# Patient Record
Sex: MALE | Race: WHITE | Employment: FULL TIME | URBAN - METROPOLITAN AREA
[De-identification: names, ages, dates, MRNs, and addresses within clinical notes are randomized per-mention and may not be internally consistent; named-entity substitution may affect disease eponyms.]

---

## 2017-01-10 ENCOUNTER — OFFICE VISIT (OUTPATIENT)
Dept: BARIATRICS/WEIGHT MGMT | Age: 42
End: 2017-01-10

## 2017-01-10 VITALS
DIASTOLIC BLOOD PRESSURE: 64 MMHG | SYSTOLIC BLOOD PRESSURE: 118 MMHG | RESPIRATION RATE: 15 BRPM | WEIGHT: 253 LBS | HEART RATE: 64 BPM | BODY MASS INDEX: 37.47 KG/M2 | HEIGHT: 69 IN

## 2017-01-10 DIAGNOSIS — E66.9 CLASS 2 OBESITY: Primary | ICD-10-CM

## 2017-01-10 DIAGNOSIS — F41.1 GAD (GENERALIZED ANXIETY DISORDER): ICD-10-CM

## 2017-01-10 DIAGNOSIS — I10 ESSENTIAL HYPERTENSION: ICD-10-CM

## 2017-01-10 PROCEDURE — 99214 OFFICE O/P EST MOD 30 MIN: CPT | Performed by: FAMILY MEDICINE

## 2017-01-10 ASSESSMENT — ENCOUNTER SYMPTOMS
GASTROINTESTINAL NEGATIVE: 1
RESPIRATORY NEGATIVE: 1
EYES NEGATIVE: 1

## 2017-01-11 DIAGNOSIS — E78.5 HYPERLIPIDEMIA WITH TARGET LDL LESS THAN 100: ICD-10-CM

## 2017-01-12 RX ORDER — SIMVASTATIN 20 MG
TABLET ORAL
Qty: 90 TABLET | Refills: 1 | Status: SHIPPED | OUTPATIENT
Start: 2017-01-12 | End: 2017-06-27 | Stop reason: SDUPTHER

## 2017-02-07 ENCOUNTER — OFFICE VISIT (OUTPATIENT)
Dept: BARIATRICS/WEIGHT MGMT | Age: 42
End: 2017-02-07

## 2017-02-07 VITALS
BODY MASS INDEX: 37.62 KG/M2 | WEIGHT: 254 LBS | HEIGHT: 69 IN | HEART RATE: 68 BPM | SYSTOLIC BLOOD PRESSURE: 122 MMHG | DIASTOLIC BLOOD PRESSURE: 74 MMHG

## 2017-02-07 DIAGNOSIS — Z71.3 DIETARY COUNSELING AND SURVEILLANCE: ICD-10-CM

## 2017-02-07 DIAGNOSIS — E66.9 CLASS 2 OBESITY: Primary | ICD-10-CM

## 2017-02-07 DIAGNOSIS — F41.1 GAD (GENERALIZED ANXIETY DISORDER): ICD-10-CM

## 2017-02-07 PROCEDURE — 99213 OFFICE O/P EST LOW 20 MIN: CPT | Performed by: FAMILY MEDICINE

## 2017-02-07 ASSESSMENT — ENCOUNTER SYMPTOMS
EYES NEGATIVE: 1
GASTROINTESTINAL NEGATIVE: 1
RESPIRATORY NEGATIVE: 1

## 2017-03-06 ENCOUNTER — OFFICE VISIT (OUTPATIENT)
Dept: INTERNAL MEDICINE CLINIC | Age: 42
End: 2017-03-06

## 2017-03-06 VITALS
RESPIRATION RATE: 18 BRPM | HEART RATE: 82 BPM | SYSTOLIC BLOOD PRESSURE: 120 MMHG | WEIGHT: 246 LBS | HEIGHT: 69 IN | BODY MASS INDEX: 36.43 KG/M2 | DIASTOLIC BLOOD PRESSURE: 78 MMHG

## 2017-03-06 DIAGNOSIS — E78.5 HYPERLIPIDEMIA WITH TARGET LDL LESS THAN 100: ICD-10-CM

## 2017-03-06 DIAGNOSIS — E11.9 CONTROLLED TYPE 2 DIABETES MELLITUS WITHOUT COMPLICATION, WITHOUT LONG-TERM CURRENT USE OF INSULIN (HCC): Primary | ICD-10-CM

## 2017-03-06 DIAGNOSIS — I10 ESSENTIAL HYPERTENSION: ICD-10-CM

## 2017-03-06 LAB — HBA1C MFR BLD: 5.7 %

## 2017-03-06 PROCEDURE — 83036 HEMOGLOBIN GLYCOSYLATED A1C: CPT | Performed by: INTERNAL MEDICINE

## 2017-03-06 PROCEDURE — 99214 OFFICE O/P EST MOD 30 MIN: CPT | Performed by: INTERNAL MEDICINE

## 2017-03-07 ENCOUNTER — OFFICE VISIT (OUTPATIENT)
Dept: BARIATRICS/WEIGHT MGMT | Age: 42
End: 2017-03-07

## 2017-03-07 VITALS
BODY MASS INDEX: 36.88 KG/M2 | HEIGHT: 69 IN | WEIGHT: 249 LBS | HEART RATE: 66 BPM | DIASTOLIC BLOOD PRESSURE: 60 MMHG | SYSTOLIC BLOOD PRESSURE: 110 MMHG | RESPIRATION RATE: 16 BRPM

## 2017-03-07 DIAGNOSIS — E66.9 CLASS 2 OBESITY: Primary | ICD-10-CM

## 2017-03-07 DIAGNOSIS — Z71.3 DIETARY COUNSELING AND SURVEILLANCE: ICD-10-CM

## 2017-03-07 PROCEDURE — 99213 OFFICE O/P EST LOW 20 MIN: CPT | Performed by: FAMILY MEDICINE

## 2017-03-07 ASSESSMENT — ENCOUNTER SYMPTOMS
GASTROINTESTINAL NEGATIVE: 1
RESPIRATORY NEGATIVE: 1
EYES NEGATIVE: 1

## 2017-04-19 DIAGNOSIS — E11.69 ERECTILE DYSFUNCTION ASSOCIATED WITH TYPE 2 DIABETES MELLITUS (HCC): ICD-10-CM

## 2017-04-19 DIAGNOSIS — N52.1 ERECTILE DYSFUNCTION ASSOCIATED WITH TYPE 2 DIABETES MELLITUS (HCC): ICD-10-CM

## 2017-04-19 RX ORDER — TADALAFIL 20 MG/1
20 TABLET ORAL PRN
Qty: 20 TABLET | Refills: 3 | Status: SHIPPED | OUTPATIENT
Start: 2017-04-19 | End: 2017-12-18 | Stop reason: ALTCHOICE

## 2017-05-02 DIAGNOSIS — F43.21 ADJUSTMENT DISORDER WITH DEPRESSED MOOD: ICD-10-CM

## 2017-05-02 RX ORDER — DULOXETIN HYDROCHLORIDE 30 MG/1
30 CAPSULE, DELAYED RELEASE ORAL DAILY
Qty: 90 CAPSULE | Refills: 0 | Status: SHIPPED | OUTPATIENT
Start: 2017-05-02 | End: 2017-06-27 | Stop reason: SDUPTHER

## 2017-06-27 ENCOUNTER — OFFICE VISIT (OUTPATIENT)
Dept: INTERNAL MEDICINE CLINIC | Age: 42
End: 2017-06-27

## 2017-06-27 VITALS
DIASTOLIC BLOOD PRESSURE: 82 MMHG | RESPIRATION RATE: 16 BRPM | HEART RATE: 68 BPM | HEIGHT: 69 IN | SYSTOLIC BLOOD PRESSURE: 115 MMHG | WEIGHT: 254 LBS | BODY MASS INDEX: 37.62 KG/M2

## 2017-06-27 DIAGNOSIS — F43.21 ADJUSTMENT DISORDER WITH DEPRESSED MOOD: ICD-10-CM

## 2017-06-27 DIAGNOSIS — I10 ESSENTIAL HYPERTENSION: ICD-10-CM

## 2017-06-27 DIAGNOSIS — E78.5 HYPERLIPIDEMIA WITH TARGET LDL LESS THAN 100: ICD-10-CM

## 2017-06-27 DIAGNOSIS — E11.69 ERECTILE DYSFUNCTION ASSOCIATED WITH TYPE 2 DIABETES MELLITUS (HCC): ICD-10-CM

## 2017-06-27 DIAGNOSIS — N52.1 ERECTILE DYSFUNCTION ASSOCIATED WITH TYPE 2 DIABETES MELLITUS (HCC): ICD-10-CM

## 2017-06-27 DIAGNOSIS — E11.9 CONTROLLED TYPE 2 DIABETES MELLITUS WITHOUT COMPLICATION, WITHOUT LONG-TERM CURRENT USE OF INSULIN (HCC): ICD-10-CM

## 2017-06-27 DIAGNOSIS — Z00.00 ANNUAL PHYSICAL EXAM: Primary | ICD-10-CM

## 2017-06-27 DIAGNOSIS — J45.909 ASTHMA DUE TO SEASONAL ALLERGIES: ICD-10-CM

## 2017-06-27 DIAGNOSIS — Z23 NEED FOR DIPHTHERIA-TETANUS-PERTUSSIS (TDAP) VACCINE, ADULT/ADOLESCENT: ICD-10-CM

## 2017-06-27 LAB
A/G RATIO: 2 (ref 1.1–2.2)
ALBUMIN SERPL-MCNC: 4.8 G/DL (ref 3.4–5)
ALP BLD-CCNC: 49 U/L (ref 40–129)
ALT SERPL-CCNC: 14 U/L (ref 10–40)
ANION GAP SERPL CALCULATED.3IONS-SCNC: 15 MMOL/L (ref 3–16)
AST SERPL-CCNC: 15 U/L (ref 15–37)
BASOPHILS ABSOLUTE: 0 K/UL (ref 0–0.2)
BASOPHILS RELATIVE PERCENT: 0.9 %
BILIRUB SERPL-MCNC: 0.7 MG/DL (ref 0–1)
BUN BLDV-MCNC: 12 MG/DL (ref 7–20)
CALCIUM SERPL-MCNC: 9.7 MG/DL (ref 8.3–10.6)
CHLORIDE BLD-SCNC: 98 MMOL/L (ref 99–110)
CHOLESTEROL, TOTAL: 141 MG/DL (ref 0–199)
CO2: 27 MMOL/L (ref 21–32)
CREAT SERPL-MCNC: 0.7 MG/DL (ref 0.9–1.3)
CREATININE URINE: 113.8 MG/DL (ref 39–259)
EOSINOPHILS ABSOLUTE: 0.1 K/UL (ref 0–0.6)
EOSINOPHILS RELATIVE PERCENT: 1.8 %
GFR AFRICAN AMERICAN: >60
GFR NON-AFRICAN AMERICAN: >60
GLOBULIN: 2.4 G/DL
GLUCOSE BLD-MCNC: 97 MG/DL (ref 70–99)
HCT VFR BLD CALC: 37.5 % (ref 40.5–52.5)
HDLC SERPL-MCNC: 75 MG/DL (ref 40–60)
HEMOGLOBIN: 12.2 G/DL (ref 13.5–17.5)
LDL CHOLESTEROL CALCULATED: 56 MG/DL
LYMPHOCYTES ABSOLUTE: 1 K/UL (ref 1–5.1)
LYMPHOCYTES RELATIVE PERCENT: 25 %
MCH RBC QN AUTO: 28 PG (ref 26–34)
MCHC RBC AUTO-ENTMCNC: 32.6 G/DL (ref 31–36)
MCV RBC AUTO: 85.8 FL (ref 80–100)
MICROALBUMIN UR-MCNC: <1.2 MG/DL
MICROALBUMIN/CREAT UR-RTO: NORMAL MG/G (ref 0–30)
MONOCYTES ABSOLUTE: 0.4 K/UL (ref 0–1.3)
MONOCYTES RELATIVE PERCENT: 10.9 %
NEUTROPHILS ABSOLUTE: 2.4 K/UL (ref 1.7–7.7)
NEUTROPHILS RELATIVE PERCENT: 61.4 %
PDW BLD-RTO: 16.2 % (ref 12.4–15.4)
PLATELET # BLD: 278 K/UL (ref 135–450)
PMV BLD AUTO: 7.8 FL (ref 5–10.5)
POTASSIUM SERPL-SCNC: 4.6 MMOL/L (ref 3.5–5.1)
RBC # BLD: 4.37 M/UL (ref 4.2–5.9)
SODIUM BLD-SCNC: 140 MMOL/L (ref 136–145)
TOTAL PROTEIN: 7.2 G/DL (ref 6.4–8.2)
TRIGL SERPL-MCNC: 52 MG/DL (ref 0–150)
VLDLC SERPL CALC-MCNC: 10 MG/DL
WBC # BLD: 3.9 K/UL (ref 4–11)

## 2017-06-27 PROCEDURE — 99396 PREV VISIT EST AGE 40-64: CPT | Performed by: INTERNAL MEDICINE

## 2017-06-27 PROCEDURE — 36415 COLL VENOUS BLD VENIPUNCTURE: CPT | Performed by: INTERNAL MEDICINE

## 2017-06-27 PROCEDURE — 90715 TDAP VACCINE 7 YRS/> IM: CPT | Performed by: INTERNAL MEDICINE

## 2017-06-27 PROCEDURE — 90471 IMMUNIZATION ADMIN: CPT | Performed by: INTERNAL MEDICINE

## 2017-06-27 RX ORDER — DULOXETIN HYDROCHLORIDE 30 MG/1
30 CAPSULE, DELAYED RELEASE ORAL DAILY
Qty: 90 CAPSULE | Refills: 3 | Status: SHIPPED | OUTPATIENT
Start: 2017-07-27 | End: 2018-07-13 | Stop reason: SDUPTHER

## 2017-06-27 RX ORDER — MONTELUKAST SODIUM 10 MG/1
TABLET ORAL
Qty: 90 TABLET | Refills: 3 | Status: SHIPPED | OUTPATIENT
Start: 2017-06-27 | End: 2018-07-31 | Stop reason: SDUPTHER

## 2017-06-27 RX ORDER — LISINOPRIL 20 MG/1
TABLET ORAL
Qty: 90 TABLET | Refills: 3 | Status: SHIPPED | OUTPATIENT
Start: 2017-06-27 | End: 2018-07-13 | Stop reason: SDUPTHER

## 2017-06-27 RX ORDER — SILDENAFIL CITRATE 20 MG/1
20 TABLET ORAL DAILY PRN
Qty: 50 TABLET | Refills: 3 | Status: SHIPPED | OUTPATIENT
Start: 2017-06-27 | End: 2019-02-11 | Stop reason: SDUPTHER

## 2017-06-27 RX ORDER — SIMVASTATIN 20 MG
TABLET ORAL
Qty: 90 TABLET | Refills: 1 | Status: SHIPPED | OUTPATIENT
Start: 2017-06-27 | End: 2017-12-18 | Stop reason: SDUPTHER

## 2017-06-27 RX ORDER — PIOGLITAZONEHYDROCHLORIDE 45 MG/1
TABLET ORAL
Qty: 90 TABLET | Refills: 3 | Status: SHIPPED | OUTPATIENT
Start: 2017-06-27 | End: 2018-07-31 | Stop reason: SDUPTHER

## 2017-06-28 ENCOUNTER — TELEPHONE (OUTPATIENT)
Dept: INTERNAL MEDICINE CLINIC | Age: 42
End: 2017-06-28

## 2017-06-28 LAB
ESTIMATED AVERAGE GLUCOSE: 128.4 MG/DL
HBA1C MFR BLD: 6.1 %

## 2017-08-03 ENCOUNTER — TELEPHONE (OUTPATIENT)
Dept: BARIATRICS/WEIGHT MGMT | Age: 42
End: 2017-08-03

## 2017-09-18 ENCOUNTER — OFFICE VISIT (OUTPATIENT)
Dept: INTERNAL MEDICINE CLINIC | Age: 42
End: 2017-09-18

## 2017-09-18 VITALS
DIASTOLIC BLOOD PRESSURE: 80 MMHG | RESPIRATION RATE: 16 BRPM | WEIGHT: 263 LBS | HEART RATE: 82 BPM | BODY MASS INDEX: 38.95 KG/M2 | HEIGHT: 69 IN | SYSTOLIC BLOOD PRESSURE: 130 MMHG

## 2017-09-18 DIAGNOSIS — E78.5 HYPERLIPIDEMIA WITH TARGET LDL LESS THAN 100: ICD-10-CM

## 2017-09-18 DIAGNOSIS — E11.9 CONTROLLED TYPE 2 DIABETES MELLITUS WITHOUT COMPLICATION, WITHOUT LONG-TERM CURRENT USE OF INSULIN (HCC): Primary | ICD-10-CM

## 2017-09-18 DIAGNOSIS — I10 ESSENTIAL HYPERTENSION: ICD-10-CM

## 2017-09-18 LAB — HBA1C MFR BLD: 5.8 %

## 2017-09-18 PROCEDURE — 83036 HEMOGLOBIN GLYCOSYLATED A1C: CPT | Performed by: INTERNAL MEDICINE

## 2017-09-18 PROCEDURE — 99213 OFFICE O/P EST LOW 20 MIN: CPT | Performed by: INTERNAL MEDICINE

## 2017-10-03 ENCOUNTER — OFFICE VISIT (OUTPATIENT)
Dept: INTERNAL MEDICINE CLINIC | Age: 42
End: 2017-10-03

## 2017-10-03 VITALS
HEIGHT: 69 IN | DIASTOLIC BLOOD PRESSURE: 74 MMHG | BODY MASS INDEX: 39.84 KG/M2 | HEART RATE: 64 BPM | RESPIRATION RATE: 20 BRPM | SYSTOLIC BLOOD PRESSURE: 118 MMHG | WEIGHT: 269 LBS

## 2017-10-03 DIAGNOSIS — H72.92 PERFORATED EAR DRUM, LEFT: Primary | ICD-10-CM

## 2017-10-03 PROCEDURE — 99213 OFFICE O/P EST LOW 20 MIN: CPT | Performed by: INTERNAL MEDICINE

## 2017-10-03 NOTE — MR AVS SNAPSHOT
After Visit Summary             Macario Guzman   10/3/2017 1:00 PM   Office Visit    Description:  Male : 1975   Provider:  Jose Ochoa MD   Department:  [facility]              Your Follow-Up and Future Appointments         Below is a list of your follow-up and future appointments. This may not be a complete list as you may have made appointments directly with providers that we are not aware of or your providers may have made some for you. Please call your providers to confirm appointments. It is important to keep your appointments. Please bring your current insurance card, photo ID, co-pay, and all medication bottles to your appointment. If self-pay, payment is expected at the time of service. Your To-Do List     Future Appointments Provider Department Dept Phone    2017 8:30 AM Jose Ochoa MD [address] [place] [address] [phone]    Follow-Up    Return if symptoms worsen or fail to improve. Information from Your Visit        Department     Name Address Phone Fax    [address], [address] [place] [address] [phone]      You Were Seen for:         Comments    Perforated ear drum, left   [[id number]]         Vital Signs     Blood Pressure Pulse Respirations Height Weight Body Mass Index    118/74 64 20 5' 9\" (1.753 m) 269 lb (122 kg) 39.72 kg/m2    Smoking Status                   Former Smoker           Additional Information about your Body Mass Index (BMI)           Your BMI as listed above is considered obese (30 or more). BMI is an estimate of body fat, calculated from your height and weight. The higher your BMI, the greater your risk of heart disease, high blood pressure, type 2 diabetes, stroke, gallstones, arthritis, sleep apnea, and certain cancers. BMI is not perfect.   It may overestimate body fat in athletes and people who are more muscular. Even a small weight loss (between 5 and 10 percent of your current weight) by decreasing your calorie intake and becoming more physically active will help lower your risk of developing or worsening diseases associated with obesity. Learn more at: [employer]way[employer]uk          Instructions    Dr. Alecia Figueroa (ENT ([place] [facility], Throat):  [phone] with Andres Walden (ENT and Facial Plastic Reconstructive surgery): [phone]  [address] [place] [address] Dr. Jose Luis Ahumada [address] [place] [address]                Medications and Orders      Your Current Medications Are              glucose blood VI test strips (ASCENSIA AUTODISC VI;ONE TOUCH ULTRA TEST VI) strip Test daily, Patient uses contour test strips    DULoxetine (CYMBALTA) 30 MG extended release capsule Take 1 capsule by mouth daily    simvastatin (ZOCOR) 20 MG tablet TAKE 1 TABLET BY MOUTH NIGHTLY    SAXagliptin-MetFORMIN ER (KOMBIGLYZE XR) 2.5-1000 MG TB24 TAKE TWO TABLETS BY MOUTH DAILY    lisinopril (PRINIVIL;ZESTRIL) 20 MG tablet TAKE ONE TABLET BY MOUTH DAILY    pioglitazone (ACTOS) 45 MG tablet TAKE ONE TABLET BY MOUTH DAILY    montelukast (SINGULAIR) 10 MG tablet TAKE ONE TABLET BY MOUTH DAILY    sildenafil (REVATIO) 20 MG tablet Take 1 tablet by mouth daily as needed (ED)    tadalafil (CIALIS) 20 MG tablet Take 1 tablet by mouth as needed (PRN)    albuterol (PROAIR HFA) 108 (90 BASE) MCG/ACT inhaler Inhale 2 puffs into the lungs every 6 hours as needed for Wheezing    Cholecalciferol (VITAMIN D3) 2000 UNITS CAPS Take by mouth    ferrous sulfate 325 (65 FE) MG tablet TAKE ONE TABLET BY MOUTH TWICE A DAY WITH MEALS    Blood Pressure Monitoring (B-D ASSURE BPM/AUTO ARM CUFF) MISC 1 Device by Does not apply route daily. Multiple Vitamin (MULTIVITAMIN, BARIATRIC FUSION COMPLETE, CHEW TAB) Take 1 tablet by mouth 3 times daily as needed. Calcium-Cholecalciferol (CALCET PETITES PO) Take 1 tablet by mouth daily. Lancets MISC Test daily, patient uses contour testing supplies    Blood Glucose Monitoring Suppl (ONE TOUCH ULTRA 2) W/DEVICE KIT 1 kit by Does not apply route daily as needed. Allergies           No Known Allergies         Additional Information        Basic Information     Date Of Birth Sex Race Ethnicity Preferred Language    1975 Male White Non-/Non  English      Problem List as of 10/3/2017  Date Reviewed: 10/3/2017                Erectile dysfunction associated with type 2 diabetes mellitus ([facility]    Asthma due to seasonal allergies    Morbid obesity with BMI of 40.0-44.9, adult ([facility]    S/P laparoscopic sleeve gastrectomy    MICHAEL (obstructive sleep apnea)    Essential hypertension    Controlled type 2 diabetes mellitus without complication, without long-term current use of insulin ([facility]    Hyperlipidemia with target LDL less than 100      Immunizations as of 10/3/2017     Name Date    Influenza Virus Vaccine 10/2/2017, 10/20/2014    Pneumococcal 13-valent Conjugate ([id number]) 7/29/2014    Pneumococcal Polysaccharide ([id number]) 11/3/2015    Tdap (Boostrix, Adacel) 6/27/2017      Preventive Care        Date Due    Diabetic Foot Exam 12/6/2017    Eye Exam By An Eye Doctor 1/24/2018    Urine Check For Kidney Problems 6/27/2018    Cholesterol Screening 6/27/2018    Hemoglobin A1C (Test For Long-Term Glucose Control) 9/18/2018    Tetanus Combination Vaccine (2 - Td) 6/27/2027            [employer] Signup           Our records indicate that you have an active [employer] account. You can view your After Visit Summary by going to [email or web address] and logging in with your [employer] username and password.       If you don't have a [employer] username and password but a parent or guardian has access to your record, the parent or guardian should login with their own [employer] username and password and access your record to view the After Visit Summary. Additional Information  If you have questions, please contact the physician practice where you receive care. Remember, [employer] is NOT to be used for urgent needs. For medical emergencies, dial 911. For questions regarding your [employer] account call [phone]. If you have a clinical question, please call your doctor's office.

## 2017-11-01 ENCOUNTER — CLINICAL DOCUMENTATION (OUTPATIENT)
Dept: PHARMACY | Facility: CLINIC | Age: 42
End: 2017-11-01

## 2017-11-01 NOTE — LETTER
Macario Guzman   [address]  [address]           11/01/17     Dear Michael Mackenzie! You have completed the requirements to participate in the [facility]) Diabetes Management program for 2018. What you will receive? You will receive beginning Jan. 1 up to $600 in waived copays ($300 if entering the program on July 1) for specific medications and pharmacy-related supplies purchased through the [facility]. A list of these items is available on the [facility] [employer] Diabetes Management page (Employee Quick Links > Human Resources > Benefits and Well Being > Diabetes Management) and on [employer] under the Diabetes Management tile. In addition, clinical support will be available if your A1c becomes greater than 8 percent. To reduce your health risks, our care coordinators and diabetes educators will assist you in better controlling your condition. What you will need to do? To maintain your benefit throughout the year and be eligible to receive the benefit next year, you must make sure you complete all the following ongoing requirements:  ? Requirements to be completed throughout year:  ? Take diabetes medication as prescribed as well as cholesterol (Statin) or high blood pressure (ACE/ARB), if needed. o 70% adherence is required of diabetes medications  o Provide documentation if cholesterol and/or high blood pressure medications are not needed. ? Lipid panel (once yearly)  ? Urine albumin (once yearly)  ? Pneumonia Vaccination (as indicated)  ? Requirements to be completed between Jan. 1 and June 30:  ? Visit with your physician (First yearly visit)  ? Meet with a [address] [place] [address] pharmacist in person at a hospital location or by phone (This visit may be conducted prior to the start of the requirements period.)  ? First A1c  ?  Requirements to be completed between July 1 and Dec. 31 ? Visit with your physician (Second yearly visit)  ? Second A1c  ? Flu vaccination   ? Requirements if A1c is greater than 8 percent:  ? Engage with a [facility] diabetes educator at one of our hospital locations or an ambulatory care coordinator by phone, if your A1c is greater than 8 percent. We will be reviewing your [employer] account and sending you reminders for needed actions. Please remember if you dont have a [address], you will need to submit documentation to [email or web address] or by fax to [phone]. As a reminder, if programs requirements are not met, your benefit may be terminated and you will not be eligible to participate in the program next year. Thank you for participating in the program and taking steps to improve your health.

## 2017-11-01 NOTE — PROGRESS NOTES
Pharmacy Pop Care Documentation:      The application for Frieda Carson for enrollment into the diabetes management program has been reviewed and accepted on 11/1/17.     Cherry Corona

## 2017-12-18 ENCOUNTER — OFFICE VISIT (OUTPATIENT)
Dept: INTERNAL MEDICINE CLINIC | Age: 42
End: 2017-12-18

## 2017-12-18 VITALS
DIASTOLIC BLOOD PRESSURE: 76 MMHG | WEIGHT: 276 LBS | RESPIRATION RATE: 18 BRPM | BODY MASS INDEX: 40.88 KG/M2 | HEART RATE: 82 BPM | SYSTOLIC BLOOD PRESSURE: 138 MMHG | HEIGHT: 69 IN

## 2017-12-18 DIAGNOSIS — E11.9 CONTROLLED TYPE 2 DIABETES MELLITUS WITHOUT COMPLICATION, WITHOUT LONG-TERM CURRENT USE OF INSULIN (HCC): Primary | ICD-10-CM

## 2017-12-18 DIAGNOSIS — E78.5 HYPERLIPIDEMIA WITH TARGET LDL LESS THAN 100: ICD-10-CM

## 2017-12-18 DIAGNOSIS — I10 ESSENTIAL HYPERTENSION: ICD-10-CM

## 2017-12-18 LAB — HBA1C MFR BLD: 5.9 %

## 2017-12-18 PROCEDURE — 99213 OFFICE O/P EST LOW 20 MIN: CPT | Performed by: INTERNAL MEDICINE

## 2017-12-18 PROCEDURE — 83036 HEMOGLOBIN GLYCOSYLATED A1C: CPT | Performed by: INTERNAL MEDICINE

## 2017-12-18 RX ORDER — SIMVASTATIN 20 MG
TABLET ORAL
Qty: 90 TABLET | Refills: 1 | Status: SHIPPED | OUTPATIENT
Start: 2017-12-18 | End: 2018-07-13 | Stop reason: SDUPTHER

## 2017-12-18 NOTE — PROGRESS NOTES
Elpidio Villalobos  YOB: 1975    Date of Service:  12/18/2017    Chief Complaint:      Chief Complaint   Patient presents with    3 Month Follow-Up      DM       HPI:  Elpidio Villalobos is a 38 y.o. Treatment Adherence:   Medication compliance: compliant all of the time   Diet compliance: compliant most of the time   Weight trend: decreasing   Current exercise: walks 3 time(s) per week   Barriers: time constraints   Diabetes Mellitus Type 2: Current symptoms/problems include Actos 45 mg qd and Kombiglyze 2.5/1000 mg 2 qd after sleeve surgery 11/10/14. Home blood sugar records: fasting range: 110-120 AM and 100 PM with occ 140's   Any episodes of hypoglycemia? no   Eye exam current (within one year): yes   Tobacco history: He reports that he quit smoking about 7 years ago. He has never used smokeless tobacco.   Daily Aspirin? No: will start   Known diabetic complications: none   Hypertension: Home blood pressure monitoring: no on Lisinopril 20 mg qd. He is adherent to a low sodium diet. Patient denies chest pain, shortness of breath, headache, lightheadedness, blurred vision, peripheral edema, palpitations, dry cough and fatigue. Antihypertensive medication side effects: no medication side effects noted. Use of agents associated with hypertension: none. Hyperlipidemia: No new myalgias or GI upset on simvastatin (Zocor) 20 mg qhs. Chronic left ear drainage daily since he had perforated ear drum 3-4 years ago. No pain or hearing loss. Obesity: Plan to have gastric sleeve in November. Adjustment d/o: stable off Cymbalta 30 mg qd.    Insomnia: stable off trazodone 150 mg qhs prn 3x/week. Anemia: not been on any iron pill recently but will restart. Vit D3: stable on Vit D3 2000 U qd. ED associated with DM: doing ok on Cialis 20 mg but too expensive. Exercise/allergies induce Asthma:  Stable on Singulair 10 mg qhs and have not need Flonase.     Lab Results   Component Value Date    LABA1C 100 strip 3    DULoxetine (CYMBALTA) 30 MG extended release capsule Take 1 capsule by mouth daily 90 capsule 3    lisinopril (PRINIVIL;ZESTRIL) 20 MG tablet TAKE ONE TABLET BY MOUTH DAILY 90 tablet 3    pioglitazone (ACTOS) 45 MG tablet TAKE ONE TABLET BY MOUTH DAILY 90 tablet 3    montelukast (SINGULAIR) 10 MG tablet TAKE ONE TABLET BY MOUTH DAILY 90 tablet 3    sildenafil (REVATIO) 20 MG tablet Take 1 tablet by mouth daily as needed (ED) 50 tablet 3    albuterol (PROAIR HFA) 108 (90 BASE) MCG/ACT inhaler Inhale 2 puffs into the lungs every 6 hours as needed for Wheezing 1 Inhaler 3    Cholecalciferol (VITAMIN D3) 2000 UNITS CAPS Take by mouth      ferrous sulfate 325 (65 FE) MG tablet TAKE ONE TABLET BY MOUTH TWICE A DAY WITH MEALS 60 tablet 1    Blood Pressure Monitoring (B-D ASSURE BPM/AUTO ARM CUFF) MISC 1 Device by Does not apply route daily. 1 each 0    Multiple Vitamin (MULTIVITAMIN, BARIATRIC FUSION COMPLETE, CHEW TAB) Take 1 tablet by mouth 3 times daily as needed.  Calcium-Cholecalciferol (CALCET PETITES PO) Take 1 tablet by mouth daily.  Lancets MISC Test daily, patient uses contour testing supplies 500 each 0    Blood Glucose Monitoring Suppl (ONE TOUCH ULTRA 2) W/DEVICE KIT 1 kit by Does not apply route daily as needed. 1 kit 0         Review of Systems: 14 systems were negative except of what was stated on HPI    Nursing note and vitals reviewed. Vitals:    12/18/17 0830   BP: 138/76   Pulse: 82   Resp: 18   Weight: 276 lb (125.2 kg)   Height: 5' 9\" (1.753 m)     Wt Readings from Last 3 Encounters:   12/18/17 276 lb (125.2 kg)   10/03/17 269 lb (122 kg)   09/18/17 263 lb (119.3 kg)     BP Readings from Last 3 Encounters:   12/18/17 138/76   10/03/17 118/74   09/18/17 130/80     Body mass index is 40.76 kg/m². Constitutional: Patient appears well-developed and well-nourished. No distress. Head: Normocephalic and atraumatic. Neck: Normal range of motion. Neck supple.  No thyroidmegaly. Cardiovascular: Normal rate, regular rhythm, normal heart sounds and intact distal pulses. Pulmonary/Chest: Effort normal and breath sounds normal. No stridor. No respiratory distress. No wheezes and no rales. Abdominal: Soft. Bowel sounds are normal. No distension and no mass. No tenderness. No rebound and no guarding. Musculoskeletal: No edema and no tenderness. Skin: No rash or erythema. Psychiatric: Normal mood and affect. Behavior is normal.     Assessment/Plan:  Marques Mcmullen was seen today for 3 month follow-up. Diagnoses and all orders for this visit:    Controlled type 2 diabetes mellitus without complication, without long-term current use of insulin (HCC)  -     Diabetic Foot Exam  -     SITagliptin-metFORMIN (JANUMET XR)  MG TB24 per extended release tablet; Take 1 tablet by mouth daily  -     POCT glycosylated hemoglobin (Hb A1C)    Hyperlipidemia with target LDL less than 100  -     simvastatin (ZOCOR) 20 MG tablet; TAKE 1 TABLET BY MOUTH NIGHTLY    Essential hypertension        Return Feb for DM.

## 2018-01-29 ENCOUNTER — OFFICE VISIT (OUTPATIENT)
Dept: BARIATRICS/WEIGHT MGMT | Age: 43
End: 2018-01-29

## 2018-01-29 VITALS
DIASTOLIC BLOOD PRESSURE: 80 MMHG | WEIGHT: 280.5 LBS | HEART RATE: 68 BPM | HEIGHT: 69 IN | SYSTOLIC BLOOD PRESSURE: 130 MMHG | BODY MASS INDEX: 41.54 KG/M2

## 2018-01-29 DIAGNOSIS — E66.01 MORBID OBESITY WITH BMI OF 40.0-44.9, ADULT (HCC): Primary | ICD-10-CM

## 2018-01-29 DIAGNOSIS — Z71.3 DIETARY COUNSELING AND SURVEILLANCE: ICD-10-CM

## 2018-01-29 PROCEDURE — 99214 OFFICE O/P EST MOD 30 MIN: CPT | Performed by: FAMILY MEDICINE

## 2018-01-29 ASSESSMENT — ENCOUNTER SYMPTOMS
RESPIRATORY NEGATIVE: 1
GASTROINTESTINAL NEGATIVE: 1
EYES NEGATIVE: 1

## 2018-01-29 NOTE — PROGRESS NOTES
Darrol Him gained 31.5 lbs over past 11 months. Pt is s/p sleeve 11/10/14. He works third shift. He is not tracking his intakes but mindful to get protein in. He is eating 5 x day. States overall he is off track d/t lacking motivation. Reports his girlfriend likes to eat carbs, which interferes with his food choices, states he will cook breakfast in the morning    Current treatment plan:   Patient is not on medication. Negative side effects from medications? N/A  Patient is not on Optifast.   Patient is  on diet and exercise only plan. Treatment plan details: low kcal low carb     Is patient adhering to diet/meal plan?: No     Carbohydrate consumption: Not tracking     Breakfast: 3-4 scrambled eggs with some cheese     Snack: bed    Lunch: protein shake made with skim milk     Snack: protein shake     Dinner: pizza or pasta w/out protein     Snack: no    Reports his portions are fine, but sometimes they are higher     Fluids: coffee with splenda, tea with splenda, water, crystal light     Taking MVI and Calcium? He is taking MVI generic- 1 x dayand Vt D     Following 30-30-30 Rule? Eating in less than 30 minutes, does not eat and drink together     Consuming at least 64oz of calorie free fluids? Yes    Participating in intentional exercise?  Hard to fit in d/t schedule and lack of motivation    Plan/Goals: slow down eating, increase MVI to bid and take Citracal Petite bid, increase exercise to at least 1 x week, encouraged pt to prepare meals at home to include protein with meals and to have more say in his food choices     Handouts: MVI handout     Shaheed Denney
Musculoskeletal: Negative. Neurological: Negative. Psychiatric/Behavioral: Negative. Physical Exam   Constitutional: He is oriented to person, place, and time. He appears well-developed and well-nourished. Weight gain    HENT:   Head: Normocephalic. Eyes: Conjunctivae and EOM are normal.   Neck: Normal range of motion. Neck supple. No thyromegaly present. Cardiovascular: Normal rate, regular rhythm and normal heart sounds. Exam reveals no gallop and no friction rub. No murmur heard. Pulmonary/Chest: Effort normal and breath sounds normal. No respiratory distress. He has no wheezes. He has no rales. Abdominal: Soft. He exhibits no mass. There is no tenderness. There is no rebound and no guarding. obese   Musculoskeletal: He exhibits no edema. Lymphadenopathy:     He has no cervical adenopathy. Neurological: He is alert and oriented to person, place, and time. Skin: Skin is warm and dry. Psychiatric: He has a normal mood and affect. His behavior is normal. Judgment and thought content normal.       Office Visit on 12/18/2017   Component Date Value Ref Range Status    Hemoglobin A1C 12/18/2017 5.9  % Final         Assessment and Plan:    ICD-10-CM ICD-9-CM    1. Morbid obesity with BMI of 40.0-44.9, adult ([facility] E66.01 278.01 Patient is  s/p sleeve gastrectomy in 2014 with a net weight loss of 27 pounds to date. The patient's current Body mass index is 41.42 kg/m². (1/29/18). He is not making the recommended lifestyle changes. He met with the registered dietitian for continued follow up. I agree with recommendations and reinforced the plan. Heavily counseled on the importance of therapeutic lifestyle changes thorough diet and exercise. Discussed available tx options- Contrave or Optifast. Patient has history of side effects from Wellbutrin, so really the only available safe option in OPTIFAST. Briefly reviewed the OPTIFAST plan.  If he chooses to pursue tx, he will need to

## 2018-01-30 ENCOUNTER — TELEPHONE (OUTPATIENT)
Dept: PHARMACY | Facility: CLINIC | Age: 43
End: 2018-01-30

## 2018-01-30 NOTE — TELEPHONE ENCOUNTER
Called patient to schedule pharmacist appointment to discuss medications for Diabetes Management Program. No answer left adela HAND  [address] [facility]  Direct: [phone]  Department, toll free: [phone], option 7

## 2018-02-04 ENCOUNTER — HOSPITAL ENCOUNTER (OUTPATIENT)
Dept: OTHER | Age: 43
Discharge: HOME | End: 2018-02-04
Attending: FAMILY MEDICINE | Admitting: FAMILY MEDICINE

## 2018-02-04 LAB
A/G RATIO: 1.6 (ref 1.1–2.2)
ALBUMIN SERPL-MCNC: 4.5 G/DL (ref 3.4–5)
ALP BLD-CCNC: 50 U/L (ref 40–129)
ALT SERPL-CCNC: 19 U/L (ref 10–40)
ANION GAP SERPL CALCULATED.3IONS-SCNC: 14 MMOL/L (ref 3–16)
AST SERPL-CCNC: 17 U/L (ref 15–37)
BASOPHILS ABSOLUTE: 0.1 K/UL (ref 0–0.2)
BASOPHILS RELATIVE PERCENT: 1.4 %
BILIRUB SERPL-MCNC: 0.9 MG/DL (ref 0–1)
BUN BLDV-MCNC: 10 MG/DL (ref 7–20)
CALCIUM SERPL-MCNC: 9.5 MG/DL (ref 8.3–10.6)
CHLORIDE BLD-SCNC: 99 MMOL/L (ref 99–110)
CHOLESTEROL, FASTING: 128 MG/DL (ref 0–199)
CO2: 25 MMOL/L (ref 21–32)
CREAT SERPL-MCNC: 0.8 MG/DL (ref 0.9–1.3)
EOSINOPHILS ABSOLUTE: 0.1 K/UL (ref 0–0.6)
EOSINOPHILS RELATIVE PERCENT: 1.3 %
FOLATE: >20 NG/ML (ref 4.78–24.2)
GFR AFRICAN AMERICAN: >60
GFR NON-AFRICAN AMERICAN: >60
GLOBULIN: 2.9 G/DL
GLUCOSE FASTING: 83 MG/DL (ref 70–99)
HCT VFR BLD CALC: 34.5 % (ref 40.5–52.5)
HDLC SERPL-MCNC: 64 MG/DL (ref 40–60)
HEMOGLOBIN: 11.5 G/DL (ref 13.5–17.5)
LDL CHOLESTEROL CALCULATED: 52 MG/DL
LYMPHOCYTES ABSOLUTE: 1.3 K/UL (ref 1–5.1)
LYMPHOCYTES RELATIVE PERCENT: 25.2 %
MCH RBC QN AUTO: 29 PG (ref 26–34)
MCHC RBC AUTO-ENTMCNC: 33.3 G/DL (ref 31–36)
MCV RBC AUTO: 87.1 FL (ref 80–100)
MONOCYTES ABSOLUTE: 0.4 K/UL (ref 0–1.3)
MONOCYTES RELATIVE PERCENT: 7.6 %
NEUTROPHILS ABSOLUTE: 3.2 K/UL (ref 1.7–7.7)
NEUTROPHILS RELATIVE PERCENT: 64.5 %
PDW BLD-RTO: 14.4 % (ref 12.4–15.4)
PLATELET # BLD: 276 K/UL (ref 135–450)
PMV BLD AUTO: 7.4 FL (ref 5–10.5)
POTASSIUM SERPL-SCNC: 3.9 MMOL/L (ref 3.5–5.1)
RBC # BLD: 3.96 M/UL (ref 4.2–5.9)
SODIUM BLD-SCNC: 138 MMOL/L (ref 136–145)
TOTAL PROTEIN: 7.4 G/DL (ref 6.4–8.2)
TRIGLYCERIDE, FASTING: 60 MG/DL (ref 0–150)
TSH REFLEX: 1.1 UIU/ML (ref 0.27–4.2)
VITAMIN B-12: 707 PG/ML (ref 211–911)
VITAMIN D 25-HYDROXY: 39.9 NG/ML
VLDLC SERPL CALC-MCNC: 12 MG/DL
WBC # BLD: 5 K/UL (ref 4–11)

## 2018-02-06 ENCOUNTER — SCHEDULED TELEPHONE ENCOUNTER (OUTPATIENT)
Dept: PHARMACY | Facility: CLINIC | Age: 43
End: 2018-02-06

## 2018-02-06 DIAGNOSIS — E11.9 CONTROLLED TYPE 2 DIABETES MELLITUS WITHOUT COMPLICATION, WITHOUT LONG-TERM CURRENT USE OF INSULIN (HCC): ICD-10-CM

## 2018-02-06 RX ORDER — LANCETS 33 GAUGE
EACH MISCELLANEOUS
Qty: 100 EACH | Refills: 3 | Status: SHIPPED | OUTPATIENT
Start: 2018-02-06 | End: 2019-05-24 | Stop reason: ALTCHOICE

## 2018-02-06 NOTE — TELEPHONE ENCOUNTER
Lee Carey MD,  Your patient is currently enrolled in [address] [place] [address] Diabetes Program.   Please assist, orders pended for your signature/modification if you agree:  · Agamatrix Presto test strips & Lancets (eligible for $0 to patient)    Thank you,  Gio Brown, PharmD, [address]  Direct: [phone]  Department, toll free: [phone], option 7

## 2018-02-06 NOTE — TELEPHONE ENCOUNTER
[facility]) Employee Diabetes Program  =================================================================  Edadine Montse is a 38 y.o. male enrolled in the [address] [place] [address] Diabetes Program.    Medications:  As per current medication list  - SMBG: not frequently testing - reports he has Agamatrix Presto meter but is out of strips/lancets  - Will  more iron tabs, sts he's currently out of  - Sts Janumet XR is BID - appears rx currently on med list as one time daily     Allergies:  No Known Allergies     Vitals/Labs:  BP Readings from Last 3 Encounters:   01/29/18 130/80   12/18/17 138/76   10/03/17 118/74     Lab Results   Component Value Date    LABMICR <1.20 06/27/2017     Lab Results   Component Value Date    LABA1C 5.9 12/18/2017    LABA1C 5.8 09/18/2017    LABA1C 6.1 06/27/2017     Lab Results   Component Value Date    CHOL 141 06/27/2017     Lab Results   Component Value Date    TRIG 52 06/27/2017     Lab Results   Component Value Date    HDL 64 (H) 02/04/2018     Lab Results   Component Value Date    LDLCALC 52 02/04/2018    LDLCHOLESTEROL 47 12/26/2014    LDLDIRECT 76 03/13/2013     ALT   Date Value Ref Range Status   02/04/2018 19 10 - 40 U/L Final     The ASCVD Risk score (Sina Ortiz, et al., 2013) failed to calculate for the following reasons: The valid total cholesterol range is 130 to 320 mg/dL     CrCl cannot be calculated (Unknown ideal weight.).     Immunizations:  Immunization History   Administered Date(s) Administered    Influenza Virus Vaccine 10/20/2014, 10/02/2017, 10/02/2017    Pneumococcal 13-valent Conjugate ([id number]) 07/29/2014    Pneumococcal Polysaccharide (Blyhoxdgy03) 11/03/2015    Tdap (Boostrix, Adacel) 06/27/2017      Smoking Status:  History   Smoking Status    Former Smoker    Packs/day: 1.50    Years: 20.00    Quit date: 1/1/2007   Smokeless Tobacco    Never Used      ASSESSMENT:  Initial Program Requirements (to be completed by 7/1/2018):  [] JOVANI with 3/22/2018 8:45 AM Deirdre Guillen, BOOM Wan Just, PharmD, [address]  Direct: [phone]  Department, toll free: [phone], option 7

## 2018-02-07 NOTE — TELEPHONE ENCOUNTER
Noted rx's signed in other encounter, thank you!    =======================================================   For Pharmacy Admin Tracking Only    PHSO: Yes  Total # of Interventions Recommended: 2  - Refills Provided #: 1  - Updated Order #: 1 Updated Order Reason(s):  Other  Total Interventions Accepted: 2  Time Spent (min): 30

## 2018-02-20 ENCOUNTER — OFFICE VISIT (OUTPATIENT)
Dept: INTERNAL MEDICINE CLINIC | Age: 43
End: 2018-02-20

## 2018-02-20 VITALS
BODY MASS INDEX: 41.47 KG/M2 | HEART RATE: 64 BPM | DIASTOLIC BLOOD PRESSURE: 78 MMHG | RESPIRATION RATE: 16 BRPM | WEIGHT: 280 LBS | SYSTOLIC BLOOD PRESSURE: 115 MMHG | HEIGHT: 69 IN

## 2018-02-20 DIAGNOSIS — E11.9 CONTROLLED TYPE 2 DIABETES MELLITUS WITHOUT COMPLICATION, WITHOUT LONG-TERM CURRENT USE OF INSULIN (HCC): ICD-10-CM

## 2018-02-20 DIAGNOSIS — E78.5 HYPERLIPIDEMIA WITH TARGET LDL LESS THAN 100: ICD-10-CM

## 2018-02-20 DIAGNOSIS — Z00.00 ANNUAL PHYSICAL EXAM: Primary | ICD-10-CM

## 2018-02-20 DIAGNOSIS — I10 ESSENTIAL HYPERTENSION: ICD-10-CM

## 2018-02-20 LAB
CREATININE URINE: 73.8 MG/DL (ref 39–259)
HBA1C MFR BLD: 5.9 %
MICROALBUMIN UR-MCNC: <1.2 MG/DL
MICROALBUMIN/CREAT UR-RTO: NORMAL MG/G (ref 0–30)

## 2018-02-20 PROCEDURE — 99396 PREV VISIT EST AGE 40-64: CPT | Performed by: INTERNAL MEDICINE

## 2018-02-20 PROCEDURE — 83036 HEMOGLOBIN GLYCOSYLATED A1C: CPT | Performed by: INTERNAL MEDICINE

## 2018-02-20 NOTE — PATIENT INSTRUCTIONS
Preventive plan of care for Angel Price        2/20/2018           Preventive Measures Status       Recommendations for screening           Diabetes Screen  Glucose (mg/dL)   Date Value   06/27/2017 97    Repeat yearly   Cholesterol Screen  Lab Results   Component Value Date    CHOL 141 06/27/2017    TRIG 52 06/27/2017    HDL 64 (H) 02/04/2018    LDLCALC 52 02/04/2018    LDLDIRECT 76 03/13/2013    Test recommended and ordered   Aspirin for Cardiovascular Prevention   No Start ASA 81 mg qd   Weight: Body mass index is 41.35 kg/m².   5' 9\" (1.753 m)280 lb (127 kg)  Your BMI is 25 or greater, which indicates that you are overweight   Living Will: No Full Code    Recommended Immunizations    Immunization History   Administered Date(s) Administered    Influenza Virus Vaccine 10/20/2014, 10/02/2017, 10/02/2017    Pneumococcal 13-valent Conjugate ([id number]) 07/29/2014    Pneumococcal Polysaccharide ([id number]) 11/03/2015    Tdap (Boostrix, Adacel) 06/27/2017    Influenza vaccine:  recommended every fall         Other Recommendations ·   See a dentist every 6 months  · Try to get at least 30 minutes of exercise 3-5 days per week  · Always wear a seat belt when traveling in a car  · Always wear a helmet when riding a bicycle or motorcycle  · When exposed to the sun, use a sunscreen that protects against both UVA and UVB radiation with an SPF of 30 or greater- reapply every 2 to 3 hours or after sweating, drying off with a towel, or swimming

## 2018-02-20 NOTE — PROGRESS NOTES
Exercise/allergies induce Asthma:  Stable on Singulair 10 mg qhs and have not need Flonase.     Lab Results   Component Value Date    LABA1C 5.9 12/18/2017    LABA1C 5.8 09/18/2017    LABA1C 6.1 06/27/2017    LABMICR <1.20 06/27/2017     Lab Results   Component Value Date     02/04/2018    K 3.9 02/04/2018    CL 99 02/04/2018    CO2 25 02/04/2018    BUN 10 02/04/2018    CREATININE 0.8 (L) 02/04/2018    GLUCOSE 97 06/27/2017    CALCIUM 9.5 02/04/2018     Lab Results   Component Value Date    CHOL 141 06/27/2017    TRIG 52 06/27/2017    HDL 64 02/04/2018    HDL 43 05/23/2012    LDLCALC 52 02/04/2018    LDLDIRECT 76 03/13/2013     Lab Results   Component Value Date    ALT 19 02/04/2018    AST 17 02/04/2018     Lab Results   Component Value Date    TSH 2.02 08/24/2016    TSH 0.93 06/12/2015     Lab Results   Component Value Date    WBC 5.0 02/04/2018    HGB 11.5 (L) 02/04/2018    HCT 34.5 (L) 02/04/2018    MCV 87.1 02/04/2018     02/04/2018     Lab Results   Component Value Date    INR 0.84 (L) 03/13/2013      No results found for: PSA   No results found for: LABURIC     Wt Readings from Last 3 Encounters:   02/20/18 280 lb (127 kg)   01/29/18 280 lb 8 oz (127.2 kg)   12/18/17 276 lb (125.2 kg)     BP Readings from Last 3 Encounters:   02/20/18 115/78   01/29/18 130/80   12/18/17 138/76       Patient Active Problem List   Diagnosis    Hyperlipidemia with target LDL less than 100    Essential hypertension    Controlled type 2 diabetes mellitus without complication, without long-term current use of insulin (HCC)    MICHAEL (obstructive sleep apnea)    S/P laparoscopic sleeve gastrectomy    Morbid obesity with BMI of 40.0-44.9, adult ([facility]    Asthma due to seasonal allergies    Erectile dysfunction associated with type 2 diabetes mellitus ([facility]       No Known Allergies  Outpatient Prescriptions Marked as Taking for the 2/20/18 encounter (Office Visit) with Bethanie Irene MD   Medication Sig Dispense Refill

## 2018-08-07 ENCOUNTER — OFFICE VISIT (OUTPATIENT)
Dept: INTERNAL MEDICINE CLINIC | Age: 43
End: 2018-08-07

## 2018-08-07 VITALS
DIASTOLIC BLOOD PRESSURE: 78 MMHG | SYSTOLIC BLOOD PRESSURE: 118 MMHG | OXYGEN SATURATION: 98 % | BODY MASS INDEX: 44.46 KG/M2 | HEART RATE: 70 BPM | WEIGHT: 300.2 LBS | HEIGHT: 69 IN

## 2018-08-07 DIAGNOSIS — J45.909 ASTHMA DUE TO SEASONAL ALLERGIES: ICD-10-CM

## 2018-08-07 DIAGNOSIS — E78.5 HYPERLIPIDEMIA WITH TARGET LDL LESS THAN 100: ICD-10-CM

## 2018-08-07 DIAGNOSIS — E66.01 MORBID OBESITY WITH BMI OF 40.0-44.9, ADULT (HCC): ICD-10-CM

## 2018-08-07 DIAGNOSIS — I10 ESSENTIAL HYPERTENSION: ICD-10-CM

## 2018-08-07 DIAGNOSIS — M77.12 LATERAL EPICONDYLITIS OF LEFT ELBOW: ICD-10-CM

## 2018-08-07 DIAGNOSIS — G47.33 OSA (OBSTRUCTIVE SLEEP APNEA): ICD-10-CM

## 2018-08-07 DIAGNOSIS — E11.9 CONTROLLED TYPE 2 DIABETES MELLITUS WITHOUT COMPLICATION, WITHOUT LONG-TERM CURRENT USE OF INSULIN (HCC): Primary | ICD-10-CM

## 2018-08-07 LAB — HBA1C MFR BLD: 6.2 %

## 2018-08-07 PROCEDURE — 83036 HEMOGLOBIN GLYCOSYLATED A1C: CPT | Performed by: INTERNAL MEDICINE

## 2018-08-07 PROCEDURE — 99214 OFFICE O/P EST MOD 30 MIN: CPT | Performed by: INTERNAL MEDICINE

## 2018-08-07 ASSESSMENT — PATIENT HEALTH QUESTIONNAIRE - PHQ9
2. FEELING DOWN, DEPRESSED OR HOPELESS: 0
SUM OF ALL RESPONSES TO PHQ QUESTIONS 1-9: 0
SUM OF ALL RESPONSES TO PHQ9 QUESTIONS 1 & 2: 0
1. LITTLE INTEREST OR PLEASURE IN DOING THINGS: 0

## 2018-08-07 NOTE — PROGRESS NOTES
Vit D3: stable on Vit D3 2000 U qd. ED associated with DM: doing ok on Cialis 20 mg but too expensive. Exercise/allergies induce Asthma:  Stable on Singulair 10 mg qhs and have not need Flonase.     Lab Results   Component Value Date    LABA1C 5.9 02/20/2018    LABA1C 5.9 12/18/2017    LABA1C 5.8 09/18/2017    LABMICR <1.20 02/20/2018     Lab Results   Component Value Date     02/04/2018    K 3.9 02/04/2018    CL 99 02/04/2018    CO2 25 02/04/2018    BUN 10 02/04/2018    CREATININE 0.8 (L) 02/04/2018    GLUCOSE 97 06/27/2017    CALCIUM 9.5 02/04/2018     Lab Results   Component Value Date    CHOL 141 06/27/2017    TRIG 52 06/27/2017    HDL 64 02/04/2018    HDL 43 05/23/2012    LDLCALC 52 02/04/2018    LDLDIRECT 76 03/13/2013     Lab Results   Component Value Date    ALT 19 02/04/2018    AST 17 02/04/2018     Lab Results   Component Value Date    TSH 2.02 08/24/2016    TSH 0.93 06/12/2015     Lab Results   Component Value Date    WBC 5.0 02/04/2018    HGB 11.5 (L) 02/04/2018    HCT 34.5 (L) 02/04/2018    MCV 87.1 02/04/2018     02/04/2018     Lab Results   Component Value Date    INR 0.84 (L) 03/13/2013      No results found for: PSA   No results found for: LABURIC     Patient Active Problem List   Diagnosis    Hyperlipidemia with target LDL less than 100    Essential hypertension    Controlled type 2 diabetes mellitus without complication, without long-term current use of insulin (HCC)    MICHAEL (obstructive sleep apnea)    S/P laparoscopic sleeve gastrectomy    Morbid obesity with BMI of 40.0-44.9, adult ([address]    Asthma due to seasonal allergies    Erectile dysfunction associated with type 2 diabetes mellitus ([facility]       No Known Allergies  Outpatient Prescriptions Marked as Taking for the 8/7/18 encounter (Office Visit) with Aleksandr Franco MD   Medication Sig Dispense Refill    montelukast (SINGULAIR) 10 MG tablet TAKE ONE TABLET BY MOUTH ONE TIME A DAY 90 tablet 1    pioglitazone (ACTOS) 45 MG tablet TAKE ONE TABLET BY MOUTH ONE TIME A DAY 90 tablet 1    DULoxetine (CYMBALTA) 30 MG extended release capsule TAKE ONE CAPSULE BY MOUTH ONE TIME A DAY 90 capsule 2    simvastatin (ZOCOR) 20 MG tablet TAKE ONE TABLET BY MOUTH NIGHTLY 90 tablet 2    lisinopril (PRINIVIL;ZESTRIL) 20 MG tablet TAKE 1 TABLET BY MOUTH DAILY 90 tablet 2    SITagliptin-metFORMIN (JANUMET XR)  MG TB24 per extended release tablet Take 2 tablets by mouth daily 180 tablet 3    glucose blood VI test strips (AGAMATRIX PRESTO TEST) strip 1 each by In Vitro route daily As needed. 100 each 3    AGAMATRIX ULTRA-THIN LANCETS MISC Use as directed to test up to 1 time daily or as needed 100 each 3    sildenafil (REVATIO) 20 MG tablet Take 1 tablet by mouth daily as needed (ED) 50 tablet 3    albuterol (PROAIR HFA) 108 (90 BASE) MCG/ACT inhaler Inhale 2 puffs into the lungs every 6 hours as needed for Wheezing 1 Inhaler 3    Cholecalciferol (VITAMIN D3) 2000 UNITS CAPS Take by mouth daily       ferrous sulfate 325 (65 FE) MG tablet TAKE ONE TABLET BY MOUTH TWICE A DAY WITH MEALS 60 tablet 1    Blood Pressure Monitoring (B-D ASSURE BPM/AUTO ARM CUFF) MISC 1 Device by Does not apply route daily. 1 each 0    Multiple Vitamin (MULTIVITAMIN, BARIATRIC FUSION COMPLETE, CHEW TAB) Take 1 tablet by mouth 3 times daily as needed. Review of Systems: 14 systems were negative except of what was stated on HPI    Nursing note and vitals reviewed. Vitals:    08/07/18 0734   BP: 118/78   Site: Right Arm   Position: Sitting   Cuff Size: Medium Adult   Pulse: 70   SpO2: 98%   Weight: (!) 300 lb 3.2 oz (136.2 kg)   Height: 5' 9\" (1.753 m)     Wt Readings from Last 3 Encounters:   08/07/18 (!) 300 lb 3.2 oz (136.2 kg)   02/20/18 280 lb (127 kg)   01/29/18 280 lb 8 oz (127.2 kg)     BP Readings from Last 3 Encounters:   08/07/18 118/78   02/20/18 115/78   01/29/18 130/80     Body mass index is 44.33 kg/m².   Constitutional: Patient

## 2019-02-11 ENCOUNTER — OFFICE VISIT (OUTPATIENT)
Dept: INTERNAL MEDICINE CLINIC | Age: 44
End: 2019-02-11
Payer: COMMERCIAL

## 2019-02-11 VITALS
SYSTOLIC BLOOD PRESSURE: 122 MMHG | BODY MASS INDEX: 45.62 KG/M2 | OXYGEN SATURATION: 100 % | WEIGHT: 308 LBS | DIASTOLIC BLOOD PRESSURE: 68 MMHG | HEIGHT: 69 IN | HEART RATE: 66 BPM

## 2019-02-11 DIAGNOSIS — E11.69 ERECTILE DYSFUNCTION ASSOCIATED WITH TYPE 2 DIABETES MELLITUS (HCC): ICD-10-CM

## 2019-02-11 DIAGNOSIS — J45.909 ASTHMA DUE TO SEASONAL ALLERGIES: ICD-10-CM

## 2019-02-11 DIAGNOSIS — I10 ESSENTIAL HYPERTENSION: ICD-10-CM

## 2019-02-11 DIAGNOSIS — E78.5 HYPERLIPIDEMIA WITH TARGET LDL LESS THAN 100: ICD-10-CM

## 2019-02-11 DIAGNOSIS — Z00.00 ANNUAL PHYSICAL EXAM: Primary | ICD-10-CM

## 2019-02-11 DIAGNOSIS — E11.9 CONTROLLED TYPE 2 DIABETES MELLITUS WITHOUT COMPLICATION, WITHOUT LONG-TERM CURRENT USE OF INSULIN (HCC): ICD-10-CM

## 2019-02-11 DIAGNOSIS — F43.21 ADJUSTMENT DISORDER WITH DEPRESSED MOOD: ICD-10-CM

## 2019-02-11 DIAGNOSIS — N52.1 ERECTILE DYSFUNCTION ASSOCIATED WITH TYPE 2 DIABETES MELLITUS (HCC): ICD-10-CM

## 2019-02-11 LAB
BASOPHILS ABSOLUTE: 0 K/UL (ref 0–0.2)
BASOPHILS RELATIVE PERCENT: 1 %
CREATININE URINE: 54.4 MG/DL (ref 39–259)
EOSINOPHILS ABSOLUTE: 0.1 K/UL (ref 0–0.6)
EOSINOPHILS RELATIVE PERCENT: 1.3 %
HCT VFR BLD CALC: 36.9 % (ref 40.5–52.5)
HEMOGLOBIN: 12.7 G/DL (ref 13.5–17.5)
LYMPHOCYTES ABSOLUTE: 1 K/UL (ref 1–5.1)
LYMPHOCYTES RELATIVE PERCENT: 23.2 %
MCH RBC QN AUTO: 29.7 PG (ref 26–34)
MCHC RBC AUTO-ENTMCNC: 34.3 G/DL (ref 31–36)
MCV RBC AUTO: 86.6 FL (ref 80–100)
MICROALBUMIN UR-MCNC: <1.2 MG/DL
MICROALBUMIN/CREAT UR-RTO: NORMAL MG/G (ref 0–30)
MONOCYTES ABSOLUTE: 0.3 K/UL (ref 0–1.3)
MONOCYTES RELATIVE PERCENT: 7.5 %
NEUTROPHILS ABSOLUTE: 2.8 K/UL (ref 1.7–7.7)
NEUTROPHILS RELATIVE PERCENT: 67 %
PDW BLD-RTO: 14.3 % (ref 12.4–15.4)
PLATELET # BLD: 282 K/UL (ref 135–450)
PMV BLD AUTO: 7.4 FL (ref 5–10.5)
RBC # BLD: 4.26 M/UL (ref 4.2–5.9)
WBC # BLD: 4.1 K/UL (ref 4–11)

## 2019-02-11 PROCEDURE — 36415 COLL VENOUS BLD VENIPUNCTURE: CPT | Performed by: INTERNAL MEDICINE

## 2019-02-11 PROCEDURE — 99396 PREV VISIT EST AGE 40-64: CPT | Performed by: INTERNAL MEDICINE

## 2019-02-11 RX ORDER — PHENTERMINE HYDROCHLORIDE 37.5 MG/1
37.5 TABLET ORAL
COMMUNITY
End: 2019-10-08 | Stop reason: ALTCHOICE

## 2019-02-11 RX ORDER — MONTELUKAST SODIUM 10 MG/1
TABLET ORAL
Qty: 90 TABLET | Refills: 3 | Status: ON HOLD | OUTPATIENT
Start: 2019-02-11 | End: 2019-11-25

## 2019-02-11 RX ORDER — PIOGLITAZONEHYDROCHLORIDE 45 MG/1
TABLET ORAL
Qty: 90 TABLET | Refills: 3 | Status: SHIPPED
Start: 2019-02-11 | End: 2020-02-20 | Stop reason: ALTCHOICE

## 2019-02-11 RX ORDER — DULOXETIN HYDROCHLORIDE 30 MG/1
CAPSULE, DELAYED RELEASE ORAL
Qty: 90 CAPSULE | Refills: 3 | Status: SHIPPED | OUTPATIENT
Start: 2019-02-11 | End: 2019-08-06 | Stop reason: DRUGHIGH

## 2019-02-11 RX ORDER — SIMVASTATIN 20 MG
TABLET ORAL
Qty: 90 TABLET | Refills: 3 | Status: SHIPPED | OUTPATIENT
Start: 2019-02-11 | End: 2020-02-20 | Stop reason: SDUPTHER

## 2019-02-11 RX ORDER — SILDENAFIL CITRATE 20 MG/1
20 TABLET ORAL DAILY PRN
Qty: 50 TABLET | Refills: 3 | Status: SHIPPED | OUTPATIENT
Start: 2019-02-11

## 2019-02-11 RX ORDER — LISINOPRIL 20 MG/1
TABLET ORAL
Qty: 90 TABLET | Refills: 3 | Status: SHIPPED | OUTPATIENT
Start: 2019-02-11 | End: 2020-02-20 | Stop reason: SDUPTHER

## 2019-02-11 ASSESSMENT — PATIENT HEALTH QUESTIONNAIRE - PHQ9
SUM OF ALL RESPONSES TO PHQ9 QUESTIONS 1 & 2: 1
SUM OF ALL RESPONSES TO PHQ QUESTIONS 1-9: 1
1. LITTLE INTEREST OR PLEASURE IN DOING THINGS: 1
SUM OF ALL RESPONSES TO PHQ QUESTIONS 1-9: 1
2. FEELING DOWN, DEPRESSED OR HOPELESS: 0

## 2019-02-12 LAB
A/G RATIO: 1.6 (ref 1.1–2.2)
ALBUMIN SERPL-MCNC: 4.5 G/DL (ref 3.4–5)
ALP BLD-CCNC: 58 U/L (ref 40–129)
ALT SERPL-CCNC: 32 U/L (ref 10–40)
ANION GAP SERPL CALCULATED.3IONS-SCNC: 15 MMOL/L (ref 3–16)
AST SERPL-CCNC: 23 U/L (ref 15–37)
BILIRUB SERPL-MCNC: 0.7 MG/DL (ref 0–1)
BUN BLDV-MCNC: 16 MG/DL (ref 7–20)
CALCIUM SERPL-MCNC: 9.9 MG/DL (ref 8.3–10.6)
CHLORIDE BLD-SCNC: 94 MMOL/L (ref 99–110)
CHOLESTEROL, TOTAL: 125 MG/DL (ref 0–199)
CO2: 26 MMOL/L (ref 21–32)
CREAT SERPL-MCNC: 0.8 MG/DL (ref 0.9–1.3)
ESTIMATED AVERAGE GLUCOSE: 134.1 MG/DL
GFR AFRICAN AMERICAN: >60
GFR NON-AFRICAN AMERICAN: >60
GLOBULIN: 2.8 G/DL
GLUCOSE BLD-MCNC: 98 MG/DL (ref 70–99)
HBA1C MFR BLD: 6.3 %
HDLC SERPL-MCNC: 53 MG/DL (ref 40–60)
LDL CHOLESTEROL CALCULATED: 54 MG/DL
POTASSIUM SERPL-SCNC: 4.8 MMOL/L (ref 3.5–5.1)
SODIUM BLD-SCNC: 135 MMOL/L (ref 136–145)
TOTAL PROTEIN: 7.3 G/DL (ref 6.4–8.2)
TRIGL SERPL-MCNC: 88 MG/DL (ref 0–150)
VLDLC SERPL CALC-MCNC: 18 MG/DL

## 2019-02-13 ENCOUNTER — TELEPHONE (OUTPATIENT)
Dept: FAMILY MEDICINE CLINIC | Age: 44
End: 2019-02-13

## 2019-03-08 ENCOUNTER — TELEPHONE (OUTPATIENT)
Dept: PHARMACY | Facility: CLINIC | Age: 44
End: 2019-03-08

## 2019-03-26 LAB — DIABETIC RETINOPATHY: NEGATIVE

## 2019-05-24 ENCOUNTER — SCHEDULED TELEPHONE ENCOUNTER (OUTPATIENT)
Dept: PHARMACY | Facility: CLINIC | Age: 44
End: 2019-05-24

## 2019-05-24 RX ORDER — BLOOD-GLUCOSE METER
EACH MISCELLANEOUS
Qty: 1 KIT | Refills: 0 | Status: SHIPPED | OUTPATIENT
Start: 2019-05-24 | End: 2021-01-12

## 2019-05-24 RX ORDER — BLOOD-GLUCOSE CONTROL, LOW
EACH MISCELLANEOUS
Qty: 100 EACH | Refills: 3 | Status: SHIPPED | OUTPATIENT
Start: 2019-05-24 | End: 2021-01-12

## 2019-05-24 RX ORDER — M-VIT,TX,IRON,MINS/CALC/FOLIC 27MG-0.4MG
1 TABLET ORAL DAILY
COMMUNITY

## 2019-05-24 NOTE — TELEPHONE ENCOUNTER
[facility]) Employee Diabetes Program  =================================================================  Mariela Gamez is a 37 y.o. male enrolled in the [address] [place] [address] Diabetes Program.    Medications:  - see updated med list- pt takes janumet xr 1 tab BID, not 2 tabs daily   Current Pharmacy: [facility] order  Current testing supplies/frequency: willing to switch to [facility]  Pen needles/syringes: na    Allergies:  No Known Allergies   Vitals/Labs:  BP Readings from Last 3 Encounters:   19 122/68   18 118/78   18 115/78     Lab Results   Component Value Date    LABMICR <1.20 2019     Lab Results   Component Value Date    LABA1C 6.3 2019    LABA1C 6.2 2018    LABA1C 5.9 2018     Lab Results   Component Value Date    CHOL 125 2019    TRIG 88 2019    HDL 53 2019    LDLCHOLESTEROL 47 2014    LDLCALC 54 2019    LDLDIRECT 76 2013     ALT   Date Value Ref Range Status   2019 32 10 - 40 U/L Final     AST   Date Value Ref Range Status   2019 23 15 - 37 U/L Final     The ASCVD Risk score (Lena Braden, et al., 2013) failed to calculate for the following reasons: The valid total cholesterol range is 130 to 320 mg/dL     Lab Results   Component Value Date    CREATININE 0.8 (L) 2019     CrCl cannot be calculated (Unknown ideal weight. ).   eGFR: > 60 mL/min/1.73 m^2    Immunizations:  Immunization History   Administered Date(s) Administered    Influenza Virus Vaccine 10/20/2014, 10/02/2017, 10/02/2017, 10/03/2018    Pneumococcal 13-valent Conjugate ([id number]) 2014    Pneumococcal Polysaccharide ([id number]) 2015    Tdap (Boostrix, Adacel) 2017      Social History:  Social History     Tobacco Use    Smoking status: Former Smoker     Packs/day: 1.50     Years: 20.00     Pack years: 30.00     Last attempt to quit: 2007     Years since quittin.4    Smokeless tobacco: Never Used   Substance Use Topics    Alcohol use: Yes     Comment: socially      ASSESSMENT:  Initial Program Requirements (Y indicates has completed for the year, N indicates needs to be completed by 7/1/19): Yes - OV with provider for DM (1st)  Yes - A1c (1st)  Yes - On statin   Yes - On ACEi/ARB       Ongoing Program Requirements (Y indicates has completed for the year, N indicates needs to be completed by 12/2020): No - OV with provider for DM (2nd)  No - ACC/diabetes educator visit (if A1c over 8%)  Yes - A1c (2nd)  Yes - Lipid panel  Yes - Urine microalbumin  Yes - Pneumococcal vaccination: up to date  No - Influenza vaccination for Fall 2019  No - Medication adherence over 70%      Current medications eligible for copay waiver, up to $600[facility] Pharmacy:  - lisinopril, pioglitazone, simvastatin, janumet xr  - Generic ([facility] pharmacy-stocked) insulin syringes and pen needles  - [facility] meter and supplies     Diabetes Care:   - Glycemic Goal: <7.0% and directed by provider. Is at blood glucose goal.    type 2 DM under excellent control as evidenced by A1c 6.3.  - Current symptoms/problems include none  - Home blood sugar records:  states suppose to test once daily but he does not test  - Any episodes of hypoglycemia? no  - Known diabetic complications: none  - Eye exam current (within one year): yes  - Foot exam current (within one year): yes  - Daily Aspirin? No:   - Medication compliance: compliant all of the time  - Diet compliance: compliant most of the time. Try to avoid carbs. No sugary drinks  - Current exercise: just starting back up but goal is 3 miles 3-5 times per week on treadmill, has treadmill at home and member of gym    Other Considerations:  - Blood Pressure Goal: BP less than 140/90 mmHg due to history of DM: Is at blood pressure goal.   - Lipids: Patient is prescribed moderate-intensity statin therapy.   - Smoking status: former     PLAN:  - Consideration(s) for provider:   · [facility] meter and supplies  - DM program gaps identified:   · Initial requirements: Requirements met   · Ongoing requirements: OV with provider for DM (2nd), A1c (2nd) and Influenza vaccination for [id number]    - Follow up: PCP for identified gaps or as scheduled below  - Upcoming appointments:   Future Appointments   Date Time Provider Adrien Johnson   5/24/2019  8:30 AM SCHEDULE, [facility] Rx None   6/3/2019 10:20 AM DO ADILENE Adkins   8/6/2019  7:40 AM Anju Lynne MD [facility]     Saul NinoD, [facility] Pharmacist  Direct: [phone]  [phone], Ext [phone]  ==================================  CLINICAL PHARMACY CONSULT: MED RECONCILIATION/REVIEW ADDENDUM    For Pharmacy Admin Tracking Only    PHSO: Yes  Total # of Interventions Recommended: 2  - New Order #: 1 New Medication Order Reason(s): Cost Conversion  - Updated Order #: 1 Updated Order Reason(s):  Other  Recommended intervention potential cost savings: 1  Total Interventions Accepted: 2  Time Spent (min): [address]

## 2019-06-03 ENCOUNTER — OFFICE VISIT (OUTPATIENT)
Dept: ENT CLINIC | Age: 44
End: 2019-06-03
Payer: COMMERCIAL

## 2019-06-03 VITALS
BODY MASS INDEX: 45.32 KG/M2 | WEIGHT: 306 LBS | TEMPERATURE: 97.7 F | HEART RATE: 76 BPM | DIASTOLIC BLOOD PRESSURE: 71 MMHG | HEIGHT: 69 IN | SYSTOLIC BLOOD PRESSURE: 124 MMHG

## 2019-06-03 DIAGNOSIS — H72.92 PERFORATION OF LEFT TYMPANIC MEMBRANE: Primary | ICD-10-CM

## 2019-06-03 DIAGNOSIS — H66.3X2 CHRONIC SUPPURATIVE OTITIS MEDIA OF LEFT EAR, UNSPECIFIED OTITIS MEDIA LOCATION: ICD-10-CM

## 2019-06-03 PROCEDURE — 92504 EAR MICROSCOPY EXAMINATION: CPT | Performed by: OTOLARYNGOLOGY

## 2019-06-03 PROCEDURE — 99203 OFFICE O/P NEW LOW 30 MIN: CPT | Performed by: OTOLARYNGOLOGY

## 2019-06-03 RX ORDER — CIPROFLOXACIN AND DEXAMETHASONE 3; 1 MG/ML; MG/ML
4 SUSPENSION/ DROPS AURICULAR (OTIC) 2 TIMES DAILY
Qty: 1 BOTTLE | Refills: 1 | Status: SHIPPED | OUTPATIENT
Start: 2019-06-03 | End: 2019-06-03

## 2019-06-03 RX ORDER — CIPROFLOXACIN AND DEXAMETHASONE 3; 1 MG/ML; MG/ML
4 SUSPENSION/ DROPS AURICULAR (OTIC) 2 TIMES DAILY
Qty: 1 BOTTLE | Refills: 1 | Status: SHIPPED | OUTPATIENT
Start: 2019-06-03 | End: 2019-06-10

## 2019-06-03 ASSESSMENT — ENCOUNTER SYMPTOMS
SINUS PRESSURE: 0
SINUS PAIN: 0
COUGH: 0
EYE ITCHING: 0
WHEEZING: 0
PHOTOPHOBIA: 0
EYE DISCHARGE: 0
DIARRHEA: 0
CHOKING: 0
NAUSEA: 0
COLOR CHANGE: 0
VOICE CHANGE: 0
BACK PAIN: 0
VOMITING: 0
FACIAL SWELLING: 0
SORE THROAT: 0
STRIDOR: 0
SHORTNESS OF BREATH: 0
TROUBLE SWALLOWING: 0
RHINORRHEA: 0
BLOOD IN STOOL: 0
CONSTIPATION: 0

## 2019-06-03 NOTE — PROGRESS NOTES
 Alcohol Abuse Maternal Cousin     Alcohol Abuse Paternal Cousin     Cancer Maternal Grandmother 45        Uterine    Cancer Paternal Grandfather         Throat    Alcohol Abuse Paternal Uncle     COPD Paternal Uncle     Alcohol Abuse Paternal Uncle        Social History     Social History     Socioeconomic History    Marital status: Single     Spouse name: Not on file    Number of children: Not on file    Years of education: Not on file    Highest education level: Not on file   Occupational History    Occupation: behavorial health     Employer: [employer] resource strain: Not on file    Food insecurity:     Worry: Not on file     Inability: Not on file    Transportation needs:     Medical: Not on file     Non-medical: Not on file   Tobacco Use    Smoking status: Former Smoker     Packs/day: 1.50     Years: 20.00     Pack years: 30.00     Last attempt to quit: 2007     Years since quittin.4    Smokeless tobacco: Never Used   Substance and Sexual Activity    Alcohol use: Yes     Comment: socially     Drug use: No    Sexual activity: Yes     Partners: Female   Lifestyle    Physical activity:     Days per week: Not on file     Minutes per session: Not on file    Stress: Not on file   Relationships    Social connections:     Talks on phone: Not on file     Gets together: Not on file     Attends Jain service: Not on file     Active member of club or organization: Not on file     Attends meetings of clubs or organizations: Not on file     Relationship status: Not on file    Intimate partner violence:     Fear of current or ex partner: Not on file     Emotionally abused: Not on file     Physically abused: Not on file     Forced sexual activity: Not on file   Other Topics Concern    Not on file   Social History Narrative    Not on file       Allergies     No Known Allergies    Medications     Current Outpatient Medications   Medication Sig Dispense Refill    ciprofloxacin-dexamethasone (CIPRODEX) 0.3-0.1 % otic suspension Place 4 drops into the left ear 2 times daily for 7 days 1 Bottle 1    Multiple Vitamins-Minerals (THERAPEUTIC MULTIVITAMIN-MINERALS) tablet Take 1 tablet by mouth daily      SITagliptin-metFORMIN (JANUMET XR)  MG TB24 per extended release tablet Take 1 tablet by mouth 2 times daily      Blood Glucose Monitoring Suppl (PRODIGY AUTOCODE BLOOD GLUCOSE) w/Device KIT Use as directed 1 kit 0    PRODIGY LANCETS 28G MISC Use to test sugar 3 times weekly or as directed 100 each 3    blood glucose test strips (PRODIGY NO CODING BLOOD GLUC) strip Use to test sugar 3 times weekly or as directed 50 each 3    phentermine (ADIPEX-P) 37.5 MG tablet Take 37.5 mg by mouth every morning (before breakfast). [facility] pioglitazone (ACTOS) 45 MG tablet TAKE 1 TABLET BY MOUTH ONE TIME A DAY 90 tablet 3    montelukast (SINGULAIR) 10 MG tablet TAKE ONE TABLET BY MOUTH ONE TIME A DAY 90 tablet 3    DULoxetine (CYMBALTA) 30 MG extended release capsule TAKE ONE CAPSULE BY MOUTH ONE TIME A DAY 90 capsule 3    simvastatin (ZOCOR) 20 MG tablet TAKE ONE TABLET BY MOUTH NIGHTLY 90 tablet 3    lisinopril (PRINIVIL;ZESTRIL) 20 MG tablet TAKE 1 TABLET BY MOUTH DAILY 90 tablet 3    sildenafil (REVATIO) 20 MG tablet Take 1 tablet by mouth daily as needed (ED) 50 tablet 3    Cholecalciferol (VITAMIN D3) 2000 UNITS CAPS Take 2,000 Units by mouth daily       ferrous sulfate 325 (65 FE) MG tablet TAKE ONE TABLET BY MOUTH TWICE A DAY WITH MEALS 60 tablet 1    Blood Pressure Monitoring (B-D ASSURE BPM/AUTO ARM CUFF) MISC 1 Device by Does not apply route daily. 1 each 0     No current facility-administered medications for this visit. Review of Systems     Review of Systems   Constitutional: Negative for activity change, appetite change, chills, diaphoresis, fatigue, fever and unexpected weight change. HENT: Positive for ear discharge and ear pain. Negative for congestion, dental problem, drooling, facial swelling, hearing loss, mouth sores, nosebleeds, postnasal drip, rhinorrhea, sinus pressure, sinus pain, sneezing, sore throat, tinnitus, trouble swallowing and voice change. Eyes: Negative for photophobia, discharge, itching and visual disturbance. Respiratory: Negative for cough, choking, shortness of breath, wheezing and stridor. Gastrointestinal: Negative for blood in stool, constipation, diarrhea, nausea and vomiting. Endocrine: Negative for cold intolerance, heat intolerance, polyphagia and polyuria. Musculoskeletal: Negative for back pain, gait problem, neck pain and neck stiffness. Skin: Negative for color change, pallor, rash and wound. Neurological: Negative for dizziness, syncope, facial asymmetry, speech difficulty, light-headedness, numbness and headaches. Hematological: Negative for adenopathy. Does not bruise/bleed easily. Psychiatric/Behavioral: Negative for agitation, confusion and sleep disturbance. PhysicalExam     Vitals:    06/03/19 1016   BP: 124/71   Pulse: 76   Temp: 97.7 °F (36.5 °C)       Physical Exam   Constitutional: He is oriented to person, place, and time. He appears well-developed and well-nourished. HENT:   Head: Normocephalic and atraumatic. Not macrocephalic and not microcephalic. Head is without raccoon's eyes, without Zhang's sign, without abrasion, without contusion, without laceration, without right periorbital erythema and without left periorbital erythema. Hair is normal.   Right Ear: Hearing, tympanic membrane and external ear normal. No drainage, swelling or tenderness. No mastoid tenderness. Tympanic membrane is not perforated, not retracted and not bulging. Tympanic membrane mobility is normal. No middle ear effusion. No decreased hearing is noted. Left Ear: Hearing and external ear normal. There is drainage. No swelling or tenderness. No mastoid tenderness.  Tympanic membrane is injected and perforated. Tympanic membrane is not retracted and not bulging. Tympanic membrane mobility is normal. A middle ear effusion is present. No decreased hearing is noted. Ears:    Nose: No mucosal edema, rhinorrhea, nose lacerations, sinus tenderness, nasal deformity, septal deviation or nasal septal hematoma. No epistaxis. No foreign bodies. Right sinus exhibits no maxillary sinus tenderness and no frontal sinus tenderness. Left sinus exhibits no maxillary sinus tenderness and no frontal sinus tenderness. Mouth/Throat: Uvula is midline and oropharynx is clear and moist. Mucous membranes are not pale, not dry and not cyanotic. No oral lesions. No trismus in the jaw. Normal dentition. No dental abscesses, uvula swelling, lacerations or dental caries. No oropharyngeal exudate, posterior oropharyngeal edema, posterior oropharyngeal erythema or tonsillar abscesses. Eyes: Lids are normal. Right eye exhibits no chemosis, no discharge and no exudate. Left eye exhibits no chemosis, no discharge and no exudate. Right eye exhibits normal extraocular motion and no nystagmus. Left eye exhibits normal extraocular motion and no nystagmus. Neck: Neck supple. Normal carotid pulses present. No tracheal tenderness present. No tracheal deviation present. No thyroid mass and no thyromegaly present. Cardiovascular: Normal rate and regular rhythm. Pulmonary/Chest: No stridor. No apnea, no tachypnea and no bradypnea. No respiratory distress. Musculoskeletal:        Right shoulder: He exhibits normal range of motion. Lymphadenopathy:        Head (right side): No submental, no submandibular, no tonsillar, no preauricular, no posterior auricular and no occipital adenopathy present. Head (left side): No submental, no submandibular, no tonsillar, no preauricular, no posterior auricular and no occipital adenopathy present. He has no cervical adenopathy.         Right cervical: No superficial cervical, no deep

## 2019-06-18 ENCOUNTER — OFFICE VISIT (OUTPATIENT)
Dept: ENT CLINIC | Age: 44
End: 2019-06-18
Payer: COMMERCIAL

## 2019-06-18 VITALS
HEART RATE: 77 BPM | HEIGHT: 69 IN | BODY MASS INDEX: 45.77 KG/M2 | DIASTOLIC BLOOD PRESSURE: 73 MMHG | TEMPERATURE: 97.2 F | WEIGHT: 309 LBS | SYSTOLIC BLOOD PRESSURE: 113 MMHG

## 2019-06-18 DIAGNOSIS — H72.92 PERFORATION OF LEFT TYMPANIC MEMBRANE: Primary | ICD-10-CM

## 2019-06-18 PROCEDURE — 99214 OFFICE O/P EST MOD 30 MIN: CPT | Performed by: OTOLARYNGOLOGY

## 2019-06-18 ASSESSMENT — ENCOUNTER SYMPTOMS
DIARRHEA: 0
TROUBLE SWALLOWING: 0
EYE REDNESS: 0
FACIAL SWELLING: 0
COUGH: 0
VOICE CHANGE: 0
NAUSEA: 0
STRIDOR: 0
PHOTOPHOBIA: 0
RHINORRHEA: 0
SORE THROAT: 0
SINUS PAIN: 0
CHOKING: 0
SHORTNESS OF BREATH: 0
EYE ITCHING: 0
EYE PAIN: 0
COLOR CHANGE: 0
SINUS PRESSURE: 0

## 2019-06-18 NOTE — PROGRESS NOTES
[facility]  [facility] KORY Yung, [address] [place]  P: [phone]  F: [phone]       Patient     Ilana Friends  1975    ChiefComplaint     Chief Complaint   Patient presents with    Ear Problem     Constant ear infections, Left ear       History of Present Illness     Nakul Nurse is here for 2-week follow-up for left otitis media with perforation. He completed his topical antibiotic drops. His ear symptoms have resolved. He is interested in bearing perforation on his eardrum.     Past Medical History     Past Medical History:   Diagnosis Date    Asthma due to seasonal allergies 10/6/2015    Gout 2010    Hyperlipidemia     Hypertension     Morbid obesity with BMI of 40.0-44.9, adult ([facility] [address]     Obesity (BMI 30-39.9) 2/12/2015    Type II or unspecified type diabetes mellitus without mention of complication, not stated as uncontrolled     Unspecified sleep apnea     uses cpap       Past Surgical History     Past Surgical History:   Procedure Laterality Date    CYST REMOVAL      DIAGNOSTIC CARDIAC CATH LAB PROCEDURE      SLEEVE GASTRECTOMY  11/10/2014    LAPAROSCOPIC       Family History     Family History   Problem Relation Age of Onset    High Blood Pressure Mother     High Cholesterol Mother     Diabetes Mother     Arthritis Mother     Heart Disease Father     High Blood Pressure Father     High Cholesterol Father     High Blood Pressure Sister     High Cholesterol Sister     Alcohol Abuse Paternal Uncle     COPD Paternal Uncle     Alcohol Abuse Maternal Cousin     Alcohol Abuse Paternal Cousin     Cancer Maternal Grandmother 45        Uterine    Cancer Paternal Grandfather         Throat    Alcohol Abuse Paternal Uncle     COPD Paternal Uncle     Alcohol Abuse Paternal Uncle        Social History     Social History     Socioeconomic History    Marital status: Single     Spouse name: Not on file    Number of children: Not on file    Years of education: Not on file    Highest education level: Not on file   Occupational History    Occupation: behavorial health     Employer: [employer] strain: Not on file    Food insecurity:     Worry: Not on file     Inability: Not on file    Transportation needs:     Medical: Not on file     Non-medical: Not on file   Tobacco Use    Smoking status: Former Smoker     Packs/day: 1.50     Years: 20.00     Pack years: 30.00     Last attempt to quit: 2007     Years since quittin.4    Smokeless tobacco: Never Used   Substance and Sexual Activity    Alcohol use: Yes     Comment: socially     Drug use: No    Sexual activity: Yes     Partners: Female   Lifestyle    Physical activity:     Days per week: Not on file     Minutes per session: Not on file    Stress: Not on file   Relationships    Social connections:     Talks on phone: Not on file     Gets together: Not on file     Attends Quaker service: Not on file     Active member of club or organization: Not on file     Attends meetings of clubs or organizations: Not on file     Relationship status: Not on file    Intimate partner violence:     Fear of current or ex partner: Not on file     Emotionally abused: Not on file     Physically abused: Not on file     Forced sexual activity: Not on file   Other Topics Concern    Not on file   Social History Narrative    Not on file       Allergies     No Known Allergies    Medications     Current Outpatient Medications   Medication Sig Dispense Refill    Multiple Vitamins-Minerals (THERAPEUTIC MULTIVITAMIN-MINERALS) tablet Take 1 tablet by mouth daily      SITagliptin-metFORMIN (JANUMET XR)  MG TB24 per extended release tablet Take 1 tablet by mouth 2 times daily      Blood Glucose Monitoring Suppl (PRODIGY AUTOCODE BLOOD GLUCOSE) w/Device KIT Use as directed 1 kit 0    PRODIGY LANCETS 28G MISC Use to test sugar 3 times weekly or as directed 100 each 3    blood glucose test strips (PRODIGY NO CODING BLOOD GLUC) strip Use to test sugar 3 times weekly or as directed 50 each 3    phentermine (ADIPEX-P) 37.5 MG tablet Take 37.5 mg by mouth every morning (before breakfast). [place] pioglitazone (ACTOS) 45 MG tablet TAKE 1 TABLET BY MOUTH ONE TIME A DAY 90 tablet 3    montelukast (SINGULAIR) 10 MG tablet TAKE ONE TABLET BY MOUTH ONE TIME A DAY 90 tablet 3    DULoxetine (CYMBALTA) 30 MG extended release capsule TAKE ONE CAPSULE BY MOUTH ONE TIME A DAY 90 capsule 3    simvastatin (ZOCOR) 20 MG tablet TAKE ONE TABLET BY MOUTH NIGHTLY 90 tablet 3    lisinopril (PRINIVIL;ZESTRIL) 20 MG tablet TAKE 1 TABLET BY MOUTH DAILY 90 tablet 3    sildenafil (REVATIO) 20 MG tablet Take 1 tablet by mouth daily as needed (ED) 50 tablet 3    Cholecalciferol (VITAMIN D3) 2000 UNITS CAPS Take 2,000 Units by mouth daily       ferrous sulfate 325 (65 FE) MG tablet TAKE ONE TABLET BY MOUTH TWICE A DAY WITH MEALS 60 tablet 1    Blood Pressure Monitoring (B-D ASSURE BPM/AUTO ARM CUFF) MISC 1 Device by Does not apply route daily. 1 each 0     No current facility-administered medications for this visit. Review of Systems     Review of Systems   Constitutional: Negative for chills, fatigue and fever. HENT: Negative for congestion, ear discharge, ear pain, facial swelling, hearing loss, nosebleeds, postnasal drip, rhinorrhea, sinus pressure, sinus pain, sneezing, sore throat, tinnitus, trouble swallowing and voice change. Eyes: Negative for photophobia, pain, redness, itching and visual disturbance. Respiratory: Negative for cough, choking, shortness of breath and stridor. Gastrointestinal: Negative for diarrhea and nausea. Musculoskeletal: Negative for neck pain and neck stiffness. Skin: Negative for color change and rash. Neurological: Negative for dizziness, facial asymmetry and light-headedness. Hematological: Negative for adenopathy.    Psychiatric/Behavioral: Negative for agitation and confusion. PhysicalExam     Vitals:    06/18/19 0907   BP: 113/73   Pulse: 77   Temp: 97.2 °F (36.2 °C)       Physical Exam   Constitutional: He is oriented to person, place, and time. He appears well-developed and well-nourished. HENT:   Head: Normocephalic and atraumatic. Right Ear: Tympanic membrane, external ear and ear canal normal. No drainage. Tympanic membrane is not perforated. No middle ear effusion. Left Ear: Tympanic membrane, external ear and ear canal normal. No drainage. Tympanic membrane is not perforated. No middle ear effusion. Ears:    Nose: No mucosal edema, rhinorrhea or septal deviation. No epistaxis. Mouth/Throat: Uvula is midline, oropharynx is clear and moist and mucous membranes are normal. No trismus in the jaw. Normal dentition. No oropharyngeal exudate. Eyes: Pupils are equal, round, and reactive to light. EOM are normal. Right eye exhibits no discharge. Left eye exhibits no discharge. No scleral icterus. Neck: Phonation normal. Neck supple. No tracheal deviation present. No thyromegaly present. Pulmonary/Chest: Effort normal. No stridor. No respiratory distress. Lymphadenopathy:     He has no cervical adenopathy. Neurological: He is alert and oriented to person, place, and time. No cranial nerve deficit. Skin: Skin is warm and dry. Psychiatric: He has a normal mood and affect. His behavior is normal.         Procedure           Assessment and Plan     1. Perforation of left tympanic membrane  Interval resolution of left otitis media. There is a residual pinhead size perforation in the anterior inferior aspect of the left tympanic membrane. For surgical repair, I recommend myringoplasties in the operating room with paper patch. Risks, benefits and alternatives explain to the patient. Risks include, but are not limited to bleeding, pain, otorrhea, persistent perforation, hearing loss.   He will fill the necessary paperwork and schedule a time for the procedure. Return for 3 week post op. Portions of this note were dictated using Dragon.  There may be linguistic errors secondary to the use of this program.

## 2019-08-06 ENCOUNTER — OFFICE VISIT (OUTPATIENT)
Dept: INTERNAL MEDICINE CLINIC | Age: 44
End: 2019-08-06
Payer: COMMERCIAL

## 2019-08-06 VITALS
SYSTOLIC BLOOD PRESSURE: 124 MMHG | DIASTOLIC BLOOD PRESSURE: 70 MMHG | HEIGHT: 69 IN | WEIGHT: 314 LBS | BODY MASS INDEX: 46.51 KG/M2 | HEART RATE: 63 BPM | OXYGEN SATURATION: 99 %

## 2019-08-06 DIAGNOSIS — J45.909 ASTHMA DUE TO SEASONAL ALLERGIES: ICD-10-CM

## 2019-08-06 DIAGNOSIS — E66.01 MORBID OBESITY WITH BMI OF 40.0-44.9, ADULT (HCC): ICD-10-CM

## 2019-08-06 DIAGNOSIS — N52.1 ERECTILE DYSFUNCTION ASSOCIATED WITH TYPE 2 DIABETES MELLITUS (HCC): ICD-10-CM

## 2019-08-06 DIAGNOSIS — E78.5 HYPERLIPIDEMIA WITH TARGET LDL LESS THAN 100: ICD-10-CM

## 2019-08-06 DIAGNOSIS — F43.21 ADJUSTMENT DISORDER WITH DEPRESSED MOOD: ICD-10-CM

## 2019-08-06 DIAGNOSIS — E11.9 CONTROLLED TYPE 2 DIABETES MELLITUS WITHOUT COMPLICATION, WITHOUT LONG-TERM CURRENT USE OF INSULIN (HCC): Primary | ICD-10-CM

## 2019-08-06 DIAGNOSIS — I10 ESSENTIAL HYPERTENSION: ICD-10-CM

## 2019-08-06 DIAGNOSIS — E11.69 ERECTILE DYSFUNCTION ASSOCIATED WITH TYPE 2 DIABETES MELLITUS (HCC): ICD-10-CM

## 2019-08-06 LAB — HBA1C MFR BLD: 6.3 %

## 2019-08-06 PROCEDURE — 99214 OFFICE O/P EST MOD 30 MIN: CPT | Performed by: INTERNAL MEDICINE

## 2019-08-06 PROCEDURE — 83036 HEMOGLOBIN GLYCOSYLATED A1C: CPT | Performed by: INTERNAL MEDICINE

## 2019-08-06 RX ORDER — DULOXETIN HYDROCHLORIDE 60 MG/1
60 CAPSULE, DELAYED RELEASE ORAL DAILY
Qty: 30 CAPSULE | Refills: 0 | Status: SHIPPED | OUTPATIENT
Start: 2019-08-06 | End: 2019-10-08

## 2019-08-06 NOTE — PROGRESS NOTES
(JANUMET XR)  MG TB24 per extended release tablet Take 1 tablet by mouth 2 times daily      Blood Glucose Monitoring Suppl (PRODIGY AUTOCODE BLOOD GLUCOSE) w/Device KIT Use as directed 1 kit 0    PRODIGY LANCETS 28G MISC Use to test sugar 3 times weekly or as directed 100 each 3    blood glucose test strips (PRODIGY NO CODING BLOOD GLUC) strip Use to test sugar 3 times weekly or as directed 50 each 3    phentermine (ADIPEX-P) 37.5 MG tablet Take 37.5 mg by mouth every morning (before breakfast). Gerard Paulino pioglitazone (ACTOS) 45 MG tablet TAKE 1 TABLET BY MOUTH ONE TIME A DAY 90 tablet 3    montelukast (SINGULAIR) 10 MG tablet TAKE ONE TABLET BY MOUTH ONE TIME A DAY 90 tablet 3    DULoxetine (CYMBALTA) 30 MG extended release capsule TAKE ONE CAPSULE BY MOUTH ONE TIME A DAY 90 capsule 3    simvastatin (ZOCOR) 20 MG tablet TAKE ONE TABLET BY MOUTH NIGHTLY 90 tablet 3    lisinopril (PRINIVIL;ZESTRIL) 20 MG tablet TAKE 1 TABLET BY MOUTH DAILY 90 tablet 3    sildenafil (REVATIO) 20 MG tablet Take 1 tablet by mouth daily as needed (ED) 50 tablet 3    Cholecalciferol (VITAMIN D3) 2000 UNITS CAPS Take 2,000 Units by mouth daily       ferrous sulfate 325 (65 FE) MG tablet TAKE ONE TABLET BY MOUTH TWICE A DAY WITH MEALS 60 tablet 1    Blood Pressure Monitoring (B-D ASSURE BPM/AUTO ARM CUFF) MISC 1 Device by Does not apply route daily. 1 each 0         Review of Systems: 14 systems were negative except of what was stated on HPI    Nursing note and vitals reviewed. Vitals:    08/06/19 0731   BP: 124/70   Pulse: 63   SpO2: 99%   Weight: (!) 314 lb (142.4 kg)   Height: 5' 9\" (1.753 m)     Wt Readings from Last 3 Encounters:   08/06/19 (!) 314 lb (142.4 kg)   06/18/19 (!) 309 lb (140.2 kg)   06/03/19 (!) 306 lb (138.8 kg)     BP Readings from Last 3 Encounters:   08/06/19 124/70   06/18/19 113/73   06/03/19 124/71     Body mass index is 46.37 kg/m².   Constitutional: Patient appears well-developed and

## 2019-08-12 ENCOUNTER — APPOINTMENT (OUTPATIENT)
Dept: GENERAL RADIOLOGY | Age: 44
End: 2019-08-12
Payer: COMMERCIAL

## 2019-08-12 ENCOUNTER — HOSPITAL ENCOUNTER (EMERGENCY)
Age: 44
Discharge: HOME | End: 2019-08-12
Payer: COMMERCIAL

## 2019-08-12 VITALS
HEART RATE: 72 BPM | SYSTOLIC BLOOD PRESSURE: 120 MMHG | DIASTOLIC BLOOD PRESSURE: 66 MMHG | BODY MASS INDEX: 44.38 KG/M2 | HEIGHT: 70 IN | WEIGHT: 310 LBS | TEMPERATURE: 98.3 F | OXYGEN SATURATION: 96 % | RESPIRATION RATE: 16 BRPM

## 2019-08-12 DIAGNOSIS — S20.211A RIB CONTUSION, RIGHT, INITIAL ENCOUNTER: Primary | ICD-10-CM

## 2019-08-12 PROCEDURE — 6370000000 HC RX 637 (ALT 250 FOR IP): Performed by: PHYSICIAN ASSISTANT

## 2019-08-12 PROCEDURE — 71101 X-RAY EXAM UNILAT RIBS/CHEST: CPT

## 2019-08-12 PROCEDURE — 99283 EMERGENCY DEPT VISIT LOW MDM: CPT

## 2019-08-12 RX ORDER — IBUPROFEN 800 MG/1
800 TABLET ORAL ONCE
Status: COMPLETED | OUTPATIENT
Start: 2019-08-12 | End: 2019-08-12

## 2019-08-12 RX ORDER — LIDOCAINE 4 G/G
1 PATCH TOPICAL ONCE
Status: DISCONTINUED | OUTPATIENT
Start: 2019-08-12 | End: 2019-08-13 | Stop reason: HOSPADM

## 2019-08-12 RX ADMIN — IBUPROFEN 800 MG: 800 TABLET, FILM COATED ORAL at 20:55

## 2019-08-12 ASSESSMENT — PAIN SCALES - GENERAL
PAINLEVEL_OUTOF10: 8
PAINLEVEL_OUTOF10: 8
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
## 2019-08-13 NOTE — ED NOTES
Discharge instructions reviewed. Patient expressed understanding.       Chriss Hatch RN  08/12/19 6945

## 2019-08-23 ENCOUNTER — ANESTHESIA EVENT (OUTPATIENT)
Dept: OPERATING ROOM | Age: 44
End: 2019-08-23
Payer: COMMERCIAL

## 2019-08-26 ENCOUNTER — ANESTHESIA (OUTPATIENT)
Dept: OPERATING ROOM | Age: 44
End: 2019-08-26
Payer: COMMERCIAL

## 2019-08-26 ENCOUNTER — HOSPITAL ENCOUNTER (OUTPATIENT)
Age: 44
Setting detail: OUTPATIENT SURGERY
Discharge: HOME | End: 2019-08-26
Attending: OTOLARYNGOLOGY | Admitting: OTOLARYNGOLOGY
Payer: COMMERCIAL

## 2019-08-26 VITALS
TEMPERATURE: 97.4 F | HEART RATE: 72 BPM | RESPIRATION RATE: 16 BRPM | BODY MASS INDEX: 44.38 KG/M2 | HEIGHT: 70 IN | OXYGEN SATURATION: 97 % | WEIGHT: 310 LBS | SYSTOLIC BLOOD PRESSURE: 117 MMHG | DIASTOLIC BLOOD PRESSURE: 69 MMHG

## 2019-08-26 VITALS — SYSTOLIC BLOOD PRESSURE: 151 MMHG | DIASTOLIC BLOOD PRESSURE: 83 MMHG | OXYGEN SATURATION: 94 %

## 2019-08-26 PROBLEM — H72.92 PERFORATION OF LEFT TYMPANIC MEMBRANE: Status: ACTIVE | Noted: 2019-08-26

## 2019-08-26 LAB
GLUCOSE BLD-MCNC: 102 MG/DL (ref 70–99)
GLUCOSE BLD-MCNC: 107 MG/DL (ref 70–99)
PERFORMED ON: ABNORMAL
PERFORMED ON: ABNORMAL

## 2019-08-26 PROCEDURE — 7100000001 HC PACU RECOVERY - ADDTL 15 MIN: Performed by: OTOLARYNGOLOGY

## 2019-08-26 PROCEDURE — 2580000003 HC RX 258: Performed by: ANESTHESIOLOGY

## 2019-08-26 PROCEDURE — 3600000014 HC SURGERY LEVEL 4 ADDTL 15MIN: Performed by: OTOLARYNGOLOGY

## 2019-08-26 PROCEDURE — 2580000003 HC RX 258: Performed by: OTOLARYNGOLOGY

## 2019-08-26 PROCEDURE — 6370000000 HC RX 637 (ALT 250 FOR IP): Performed by: OTOLARYNGOLOGY

## 2019-08-26 PROCEDURE — 3700000001 HC ADD 15 MINUTES (ANESTHESIA): Performed by: OTOLARYNGOLOGY

## 2019-08-26 PROCEDURE — 6360000002 HC RX W HCPCS: Performed by: NURSE ANESTHETIST, CERTIFIED REGISTERED

## 2019-08-26 PROCEDURE — 69610 TYMPANIC MEMBRANE REPAIR: CPT | Performed by: OTOLARYNGOLOGY

## 2019-08-26 PROCEDURE — 2709999900 HC NON-CHARGEABLE SUPPLY: Performed by: OTOLARYNGOLOGY

## 2019-08-26 PROCEDURE — 7100000010 HC PHASE II RECOVERY - FIRST 15 MIN: Performed by: OTOLARYNGOLOGY

## 2019-08-26 PROCEDURE — 3600000004 HC SURGERY LEVEL 4 BASE: Performed by: OTOLARYNGOLOGY

## 2019-08-26 PROCEDURE — 2500000003 HC RX 250 WO HCPCS: Performed by: OTOLARYNGOLOGY

## 2019-08-26 PROCEDURE — 7100000011 HC PHASE II RECOVERY - ADDTL 15 MIN: Performed by: OTOLARYNGOLOGY

## 2019-08-26 PROCEDURE — 7100000000 HC PACU RECOVERY - FIRST 15 MIN: Performed by: OTOLARYNGOLOGY

## 2019-08-26 PROCEDURE — 3700000000 HC ANESTHESIA ATTENDED CARE: Performed by: OTOLARYNGOLOGY

## 2019-08-26 PROCEDURE — 2500000003 HC RX 250 WO HCPCS: Performed by: NURSE ANESTHETIST, CERTIFIED REGISTERED

## 2019-08-26 RX ORDER — SUCCINYLCHOLINE/SOD CL,ISO/PF 200MG/10ML
SYRINGE (ML) INTRAVENOUS PRN
Status: DISCONTINUED | OUTPATIENT
Start: 2019-08-26 | End: 2019-08-26 | Stop reason: SDUPTHER

## 2019-08-26 RX ORDER — LIDOCAINE HYDROCHLORIDE 20 MG/ML
INJECTION, SOLUTION INTRAVENOUS PRN
Status: DISCONTINUED | OUTPATIENT
Start: 2019-08-26 | End: 2019-08-26 | Stop reason: SDUPTHER

## 2019-08-26 RX ORDER — FENTANYL CITRATE 50 UG/ML
25 INJECTION, SOLUTION INTRAMUSCULAR; INTRAVENOUS EVERY 5 MIN PRN
Status: CANCELLED | OUTPATIENT
Start: 2019-08-26

## 2019-08-26 RX ORDER — PROPOFOL 10 MG/ML
INJECTION, EMULSION INTRAVENOUS PRN
Status: DISCONTINUED | OUTPATIENT
Start: 2019-08-26 | End: 2019-08-26 | Stop reason: SDUPTHER

## 2019-08-26 RX ORDER — ONDANSETRON 2 MG/ML
4 INJECTION INTRAMUSCULAR; INTRAVENOUS
Status: CANCELLED | OUTPATIENT
Start: 2019-08-26 | End: 2019-08-26

## 2019-08-26 RX ORDER — FENTANYL CITRATE 50 UG/ML
50 INJECTION, SOLUTION INTRAMUSCULAR; INTRAVENOUS EVERY 5 MIN PRN
Status: CANCELLED | OUTPATIENT
Start: 2019-08-26

## 2019-08-26 RX ORDER — MAGNESIUM HYDROXIDE 1200 MG/15ML
LIQUID ORAL CONTINUOUS PRN
Status: COMPLETED | OUTPATIENT
Start: 2019-08-26 | End: 2019-08-26

## 2019-08-26 RX ORDER — ONDANSETRON 2 MG/ML
INJECTION INTRAMUSCULAR; INTRAVENOUS PRN
Status: DISCONTINUED | OUTPATIENT
Start: 2019-08-26 | End: 2019-08-26 | Stop reason: SDUPTHER

## 2019-08-26 RX ORDER — GLYCOPYRROLATE 1 MG/5 ML
SYRINGE (ML) INTRAVENOUS PRN
Status: DISCONTINUED | OUTPATIENT
Start: 2019-08-26 | End: 2019-08-26 | Stop reason: SDUPTHER

## 2019-08-26 RX ORDER — SODIUM CHLORIDE, SODIUM LACTATE, POTASSIUM CHLORIDE, CALCIUM CHLORIDE 600; 310; 30; 20 MG/100ML; MG/100ML; MG/100ML; MG/100ML
INJECTION, SOLUTION INTRAVENOUS CONTINUOUS
Status: DISCONTINUED | OUTPATIENT
Start: 2019-08-26 | End: 2019-08-26 | Stop reason: HOSPADM

## 2019-08-26 RX ORDER — MIDAZOLAM HYDROCHLORIDE 1 MG/ML
INJECTION INTRAMUSCULAR; INTRAVENOUS PRN
Status: DISCONTINUED | OUTPATIENT
Start: 2019-08-26 | End: 2019-08-26 | Stop reason: SDUPTHER

## 2019-08-26 RX ORDER — EPINEPHRINE NASAL SOLUTION 1 MG/ML
SOLUTION NASAL PRN
Status: DISCONTINUED | OUTPATIENT
Start: 2019-08-26 | End: 2019-08-26 | Stop reason: ALTCHOICE

## 2019-08-26 RX ORDER — ROCURONIUM BROMIDE 10 MG/ML
INJECTION, SOLUTION INTRAVENOUS PRN
Status: DISCONTINUED | OUTPATIENT
Start: 2019-08-26 | End: 2019-08-26 | Stop reason: SDUPTHER

## 2019-08-26 RX ADMIN — LIDOCAINE HYDROCHLORIDE 60 MG: 20 INJECTION, SOLUTION INTRAVENOUS at 09:22

## 2019-08-26 RX ADMIN — Medication 160 MG: at 09:22

## 2019-08-26 RX ADMIN — ROCURONIUM BROMIDE 10 MG: 10 INJECTION, SOLUTION INTRAVENOUS at 09:22

## 2019-08-26 RX ADMIN — SODIUM CHLORIDE, POTASSIUM CHLORIDE, SODIUM LACTATE AND CALCIUM CHLORIDE: 600; 310; 30; 20 INJECTION, SOLUTION INTRAVENOUS at 08:59

## 2019-08-26 RX ADMIN — SUGAMMADEX 200 MG: 100 INJECTION, SOLUTION INTRAVENOUS at 09:48

## 2019-08-26 RX ADMIN — SUGAMMADEX 200 MG: 100 INJECTION, SOLUTION INTRAVENOUS at 09:38

## 2019-08-26 RX ADMIN — PROPOFOL 250 MG: 10 INJECTION, EMULSION INTRAVENOUS at 09:22

## 2019-08-26 RX ADMIN — MIDAZOLAM HYDROCHLORIDE 2 MG: 2 INJECTION, SOLUTION INTRAMUSCULAR; INTRAVENOUS at 09:16

## 2019-08-26 RX ADMIN — ROCURONIUM BROMIDE 20 MG: 10 INJECTION, SOLUTION INTRAVENOUS at 09:28

## 2019-08-26 RX ADMIN — Medication 0.2 MG: at 09:41

## 2019-08-26 RX ADMIN — ONDANSETRON 4 MG: 2 INJECTION INTRAMUSCULAR; INTRAVENOUS at 09:46

## 2019-08-26 ASSESSMENT — PULMONARY FUNCTION TESTS
PIF_VALUE: 24
PIF_VALUE: 3
PIF_VALUE: 20
PIF_VALUE: 0
PIF_VALUE: 0
PIF_VALUE: 7
PIF_VALUE: 34
PIF_VALUE: 2
PIF_VALUE: 23
PIF_VALUE: 0
PIF_VALUE: 25
PIF_VALUE: 22
PIF_VALUE: 20
PIF_VALUE: 10
PIF_VALUE: 23
PIF_VALUE: 22
PIF_VALUE: 0
PIF_VALUE: 22
PIF_VALUE: 2
PIF_VALUE: 1
PIF_VALUE: 0
PIF_VALUE: 22
PIF_VALUE: 0
PIF_VALUE: 20
PIF_VALUE: 20
PIF_VALUE: 1
PIF_VALUE: 10
PIF_VALUE: 2
PIF_VALUE: 1
PIF_VALUE: 21

## 2019-08-26 ASSESSMENT — PAIN SCALES - GENERAL
PAINLEVEL_OUTOF10: 0
PAINLEVEL_OUTOF10: 0

## 2019-08-26 ASSESSMENT — PAIN DESCRIPTION - DESCRIPTORS: DESCRIPTORS: ACHING

## 2019-08-26 ASSESSMENT — PAIN - FUNCTIONAL ASSESSMENT: PAIN_FUNCTIONAL_ASSESSMENT: 0-10

## 2019-08-26 NOTE — PROGRESS NOTES
Ambulatory Surgery/Procedure Discharge Note    Vitals:    08/26/19 1046   BP: 117/69   Pulse: 72   Resp: 16   Temp: 97.4 °F (36.3 °C)   SpO2: 97%   Patient and wife verbalized understanding of discharge instructions. Left ear with cotton in inner canal, no drainage noted. Ptient tolerating ice chips and denies nausea. In: 650 [I.V.:650]  Out: 2     Restroom use offered before discharge. Yes    Pain assessment:  none  Pain Level: 0        Patient discharged to home/self care.  Patient discharged via wheel chair by transporter to waiting family/S.O.       8/26/2019 10:59 AM
protect them while you are in surgery. 16. If you use a CPAP, please bring it with you on the day of your procedure. 17. Do not shave or wax for 72 hours prior to procedure near your operative site  18. FOR WOMAN OF CHILDBEARING AGE ONLY- please bring a urine sample with you on day of surgery or make sure we can collect on arrival.    If you have further questions, you may contact your surgeon's office or us at [phone]    Left instructions on patient's voicemail. Malena Hensley. 8/22/2019 .3:42 PM
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
## 2019-08-26 NOTE — ANESTHESIA PRE PROCEDURE
Plan      general     ASA 3       Induction: intravenous. MIPS: Postoperative opioids intended and Prophylactic antiemetics administered. Anesthetic plan and risks discussed with patient.         Attending anesthesiologist reviewed and agrees with Tanisha Milner MD   8/26/2019

## 2019-08-28 ENCOUNTER — TELEPHONE (OUTPATIENT)
Dept: ENT CLINIC | Age: 44
End: 2019-08-28

## 2019-09-03 ENCOUNTER — OFFICE VISIT (OUTPATIENT)
Dept: INTERNAL MEDICINE CLINIC | Age: 44
End: 2019-09-03
Payer: COMMERCIAL

## 2019-09-03 VITALS
WEIGHT: 315 LBS | SYSTOLIC BLOOD PRESSURE: 134 MMHG | HEART RATE: 76 BPM | OXYGEN SATURATION: 99 % | HEIGHT: 70 IN | BODY MASS INDEX: 45.1 KG/M2 | DIASTOLIC BLOOD PRESSURE: 66 MMHG

## 2019-09-03 DIAGNOSIS — M25.561 ACUTE PAIN OF RIGHT KNEE: ICD-10-CM

## 2019-09-03 DIAGNOSIS — F51.01 PRIMARY INSOMNIA: ICD-10-CM

## 2019-09-03 DIAGNOSIS — F43.21 ADJUSTMENT DISORDER WITH DEPRESSED MOOD: Primary | ICD-10-CM

## 2019-09-03 PROCEDURE — 99214 OFFICE O/P EST MOD 30 MIN: CPT | Performed by: INTERNAL MEDICINE

## 2019-09-04 NOTE — TELEPHONE ENCOUNTER
Documented in the wrong chart and merging the messages. Patient called and said that when he woke up this morning the cotton ball was soaked in blood, and that his ear keeps popping, however his ear is not bleeding now and he did not put anything in his ear and he made sure that he did not get the ear wet when he showered. Please advise    Dr. Chaitanya Thompson response    Nothing to worry about at this point.  Bleeding is expected

## 2019-09-11 ENCOUNTER — TELEPHONE (OUTPATIENT)
Dept: ENT CLINIC | Age: 44
End: 2019-09-11

## 2019-09-20 ENCOUNTER — OFFICE VISIT (OUTPATIENT)
Dept: ENT CLINIC | Age: 44
End: 2019-09-20
Payer: COMMERCIAL

## 2019-09-20 VITALS
TEMPERATURE: 97.3 F | HEIGHT: 70 IN | DIASTOLIC BLOOD PRESSURE: 73 MMHG | BODY MASS INDEX: 44.24 KG/M2 | HEART RATE: 67 BPM | SYSTOLIC BLOOD PRESSURE: 121 MMHG | WEIGHT: 309 LBS

## 2019-09-20 DIAGNOSIS — H72.92 PERFORATION OF LEFT TYMPANIC MEMBRANE: Primary | ICD-10-CM

## 2019-09-20 PROCEDURE — 99213 OFFICE O/P EST LOW 20 MIN: CPT | Performed by: OTOLARYNGOLOGY

## 2019-09-20 PROCEDURE — 92504 EAR MICROSCOPY EXAMINATION: CPT | Performed by: OTOLARYNGOLOGY

## 2019-09-20 ASSESSMENT — ENCOUNTER SYMPTOMS
NAUSEA: 0
COUGH: 0
EYE ITCHING: 0
DIARRHEA: 0
SORE THROAT: 0
STRIDOR: 0
SINUS PAIN: 0
PHOTOPHOBIA: 0
EYE PAIN: 0
SHORTNESS OF BREATH: 0
FACIAL SWELLING: 0
TROUBLE SWALLOWING: 0
EYE REDNESS: 0
CHOKING: 0
VOICE CHANGE: 0
SINUS PRESSURE: 0
RHINORRHEA: 0
COLOR CHANGE: 0

## 2019-09-20 NOTE — PROGRESS NOTES
Social History     Socioeconomic History    Marital status: Single     Spouse name: Not on file    Number of children: Not on file    Years of education: Not on file    Highest education level: Not on file   Occupational History    Occupation: [employer]     Employer: [employer] resource strain: Not on file    Food insecurity:     Worry: Not on file     Inability: Not on file    Transportation needs:     Medical: Not on file     Non-medical: Not on file   Tobacco Use    Smoking status: Former Smoker     Packs/day: 1.50     Years: 20.00     Pack years: 30.00     Last attempt to quit: 2007     Years since quittin.7    Smokeless tobacco: Never Used   Substance and Sexual Activity    Alcohol use: Yes     Comment: socially     Drug use: No    Sexual activity: Yes     Partners: Female   Lifestyle    Physical activity:     Days per week: Not on file     Minutes per session: Not on file    Stress: Not on file   Relationships    Social connections:     Talks on phone: Not on file     Gets together: Not on file     Attends Orthodoxy service: Not on file     Active member of club or organization: Not on file     Attends meetings of clubs or organizations: Not on file     Relationship status: Not on file    Intimate partner violence:     Fear of current or ex partner: Not on file     Emotionally abused: Not on file     Physically abused: Not on file     Forced sexual activity: Not on file   Other Topics Concern    Not on file   Social History Narrative    Not on file       Allergies     No Known Allergies    Medications     Current Outpatient Medications   Medication Sig Dispense Refill    DULoxetine (CYMBALTA) 60 MG extended release capsule Take 1 capsule by mouth daily 30 capsule 0    Multiple Vitamins-Minerals (THERAPEUTIC MULTIVITAMIN-MINERALS) tablet Take 1 tablet by mouth daily      SITagliptin-metFORMIN (JANUMET XR)  MG TB24 per unfortunately has a persistent left-sided tympanic membrane perforation. I discussed with him to courses of action at this time. One would be observation and dry ear precautions. The second would be intervention with a more definitive tympanoplasty with fascia graft. Risks, benefits and alternatives discussed with the patient. Risks include, but are not limited to bleeding, infection, pain, otorrhea, failure of graft, taste disturbance, facial nerve weakness or paralysis and total hearing loss. He is going to discuss possibly pursuing this with his wife and get back to our office. Return for discuss surgery. Portions of this note were dictated using Dragon.  There may be linguistic errors secondary to the use of this program.

## 2019-10-01 ENCOUNTER — OFFICE VISIT (OUTPATIENT)
Dept: ORTHOPEDIC SURGERY | Age: 44
End: 2019-10-01
Payer: COMMERCIAL

## 2019-10-01 VITALS — BODY MASS INDEX: 44.25 KG/M2 | WEIGHT: 309.08 LBS | HEIGHT: 70 IN

## 2019-10-01 DIAGNOSIS — M25.561 RIGHT KNEE PAIN, UNSPECIFIED CHRONICITY: Primary | ICD-10-CM

## 2019-10-01 PROCEDURE — 99203 OFFICE O/P NEW LOW 30 MIN: CPT | Performed by: ORTHOPAEDIC SURGERY

## 2019-10-08 ENCOUNTER — HOSPITAL ENCOUNTER (OUTPATIENT)
Dept: MRI IMAGING | Age: 44
Discharge: HOME | End: 2019-10-08
Payer: COMMERCIAL

## 2019-10-08 ENCOUNTER — OFFICE VISIT (OUTPATIENT)
Dept: INTERNAL MEDICINE CLINIC | Age: 44
End: 2019-10-08
Payer: COMMERCIAL

## 2019-10-08 ENCOUNTER — TELEPHONE (OUTPATIENT)
Dept: INTERNAL MEDICINE CLINIC | Age: 44
End: 2019-10-08

## 2019-10-08 VITALS
BODY MASS INDEX: 45.1 KG/M2 | WEIGHT: 315 LBS | DIASTOLIC BLOOD PRESSURE: 78 MMHG | HEART RATE: 68 BPM | HEIGHT: 70 IN | OXYGEN SATURATION: 98 % | SYSTOLIC BLOOD PRESSURE: 124 MMHG

## 2019-10-08 DIAGNOSIS — M25.561 RIGHT KNEE PAIN, UNSPECIFIED CHRONICITY: ICD-10-CM

## 2019-10-08 DIAGNOSIS — F34.1 DYSTHYMIA: Primary | ICD-10-CM

## 2019-10-08 DIAGNOSIS — F51.01 PRIMARY INSOMNIA: ICD-10-CM

## 2019-10-08 PROCEDURE — 99213 OFFICE O/P EST LOW 20 MIN: CPT | Performed by: INTERNAL MEDICINE

## 2019-10-08 PROCEDURE — 73721 MRI JNT OF LWR EXTRE W/O DYE: CPT

## 2019-10-08 RX ORDER — ESCITALOPRAM OXALATE 10 MG/1
10 TABLET ORAL DAILY
Qty: 30 TABLET | Refills: 0 | Status: SHIPPED | OUTPATIENT
Start: 2019-10-08 | End: 2019-11-04 | Stop reason: DRUGHIGH

## 2019-10-15 ENCOUNTER — OFFICE VISIT (OUTPATIENT)
Dept: ORTHOPEDIC SURGERY | Age: 44
End: 2019-10-15
Payer: COMMERCIAL

## 2019-10-15 VITALS — HEIGHT: 70 IN | WEIGHT: 315 LBS | BODY MASS INDEX: 45.1 KG/M2

## 2019-10-15 DIAGNOSIS — S83.241D TEAR OF MEDIAL MENISCUS OF RIGHT KNEE, CURRENT, UNSPECIFIED TEAR TYPE, SUBSEQUENT ENCOUNTER: Primary | ICD-10-CM

## 2019-10-15 PROCEDURE — 99213 OFFICE O/P EST LOW 20 MIN: CPT | Performed by: ORTHOPAEDIC SURGERY

## 2019-10-15 PROCEDURE — E0114 CRUTCH UNDERARM PAIR NO WOOD: HCPCS | Performed by: ORTHOPAEDIC SURGERY

## 2019-10-17 ENCOUNTER — TELEPHONE (OUTPATIENT)
Dept: INTERNAL MEDICINE CLINIC | Age: 44
End: 2019-10-17

## 2019-10-21 ENCOUNTER — PATIENT MESSAGE (OUTPATIENT)
Dept: INTERNAL MEDICINE CLINIC | Age: 44
End: 2019-10-21

## 2019-10-21 DIAGNOSIS — G47.33 OSA ON CPAP: Primary | ICD-10-CM

## 2019-10-21 DIAGNOSIS — Z99.89 OSA ON CPAP: Primary | ICD-10-CM

## 2019-10-29 ENCOUNTER — TELEPHONE (OUTPATIENT)
Dept: ORTHOPEDIC SURGERY | Age: 44
End: 2019-10-29

## 2019-11-04 ENCOUNTER — OFFICE VISIT (OUTPATIENT)
Dept: INTERNAL MEDICINE CLINIC | Age: 44
End: 2019-11-04
Payer: COMMERCIAL

## 2019-11-04 ENCOUNTER — TELEPHONE (OUTPATIENT)
Dept: ORTHOPEDIC SURGERY | Age: 44
End: 2019-11-04

## 2019-11-04 VITALS
WEIGHT: 315 LBS | DIASTOLIC BLOOD PRESSURE: 68 MMHG | BODY MASS INDEX: 45.1 KG/M2 | OXYGEN SATURATION: 98 % | HEIGHT: 70 IN | HEART RATE: 78 BPM | SYSTOLIC BLOOD PRESSURE: 132 MMHG

## 2019-11-04 DIAGNOSIS — F34.1 DYSTHYMIA: Primary | ICD-10-CM

## 2019-11-04 DIAGNOSIS — F51.01 PRIMARY INSOMNIA: ICD-10-CM

## 2019-11-04 PROCEDURE — 99213 OFFICE O/P EST LOW 20 MIN: CPT | Performed by: INTERNAL MEDICINE

## 2019-11-04 RX ORDER — ESCITALOPRAM OXALATE 20 MG/1
20 TABLET ORAL DAILY
Qty: 30 TABLET | Refills: 0 | Status: SHIPPED | OUTPATIENT
Start: 2019-11-04 | End: 2019-11-18 | Stop reason: SDUPTHER

## 2019-11-18 ENCOUNTER — OFFICE VISIT (OUTPATIENT)
Dept: INTERNAL MEDICINE CLINIC | Age: 44
End: 2019-11-18
Payer: COMMERCIAL

## 2019-11-18 VITALS
WEIGHT: 315 LBS | SYSTOLIC BLOOD PRESSURE: 130 MMHG | BODY MASS INDEX: 45.1 KG/M2 | OXYGEN SATURATION: 98 % | HEIGHT: 70 IN | DIASTOLIC BLOOD PRESSURE: 74 MMHG | HEART RATE: 80 BPM

## 2019-11-18 DIAGNOSIS — F34.1 DYSTHYMIA: ICD-10-CM

## 2019-11-18 DIAGNOSIS — E11.9 CONTROLLED TYPE 2 DIABETES MELLITUS WITHOUT COMPLICATION, WITHOUT LONG-TERM CURRENT USE OF INSULIN (HCC): ICD-10-CM

## 2019-11-18 DIAGNOSIS — E66.01 MORBID OBESITY WITH BMI OF 40.0-44.9, ADULT (HCC): ICD-10-CM

## 2019-11-18 DIAGNOSIS — Z01.818 PREOP EXAM FOR INTERNAL MEDICINE: Primary | ICD-10-CM

## 2019-11-18 DIAGNOSIS — I10 ESSENTIAL HYPERTENSION: ICD-10-CM

## 2019-11-18 DIAGNOSIS — M23.203 OLD COMPLEX TEAR OF MEDIAL MENISCUS OF RIGHT KNEE: ICD-10-CM

## 2019-11-18 DIAGNOSIS — E78.5 HYPERLIPIDEMIA WITH TARGET LDL LESS THAN 100: ICD-10-CM

## 2019-11-18 PROCEDURE — 99244 OFF/OP CNSLTJ NEW/EST MOD 40: CPT | Performed by: INTERNAL MEDICINE

## 2019-11-18 RX ORDER — ESCITALOPRAM OXALATE 20 MG/1
20 TABLET ORAL DAILY
Qty: 90 TABLET | Refills: 0 | Status: SHIPPED | OUTPATIENT
Start: 2019-11-18 | End: 2020-02-05

## 2019-11-21 ENCOUNTER — OFFICE VISIT (OUTPATIENT)
Dept: PULMONOLOGY | Age: 44
End: 2019-11-21
Payer: COMMERCIAL

## 2019-11-21 VITALS
HEART RATE: 79 BPM | DIASTOLIC BLOOD PRESSURE: 76 MMHG | RESPIRATION RATE: 16 BRPM | HEIGHT: 70 IN | SYSTOLIC BLOOD PRESSURE: 118 MMHG | OXYGEN SATURATION: 98 % | WEIGHT: 315 LBS | BODY MASS INDEX: 45.1 KG/M2

## 2019-11-21 DIAGNOSIS — G47.33 OSA (OBSTRUCTIVE SLEEP APNEA): Primary | ICD-10-CM

## 2019-11-21 PROCEDURE — 99203 OFFICE O/P NEW LOW 30 MIN: CPT | Performed by: INTERNAL MEDICINE

## 2019-11-21 ASSESSMENT — SLEEP AND FATIGUE QUESTIONNAIRES
HOW LIKELY ARE YOU TO NOD OFF OR FALL ASLEEP WHILE LYING DOWN TO REST IN THE AFTERNOON WHEN CIRCUMSTANCES PERMIT: 3
HOW LIKELY ARE YOU TO NOD OFF OR FALL ASLEEP IN A CAR, WHILE STOPPED FOR A FEW MINUTES IN TRAFFIC: 0
ESS TOTAL SCORE: 3
HOW LIKELY ARE YOU TO NOD OFF OR FALL ASLEEP WHILE SITTING AND TALKING TO SOMEONE: 0
HOW LIKELY ARE YOU TO NOD OFF OR FALL ASLEEP WHILE SITTING AND READING: 0
HOW LIKELY ARE YOU TO NOD OFF OR FALL ASLEEP WHILE WATCHING TV: 0
HOW LIKELY ARE YOU TO NOD OFF OR FALL ASLEEP WHILE SITTING QUIETLY AFTER LUNCH WITHOUT ALCOHOL: 0
HOW LIKELY ARE YOU TO NOD OFF OR FALL ASLEEP WHEN YOU ARE A PASSENGER IN A CAR FOR AN HOUR WITHOUT A BREAK: 0
NECK CIRCUMFERENCE (INCHES): 18
HOW LIKELY ARE YOU TO NOD OFF OR FALL ASLEEP WHILE SITTING INACTIVE IN A PUBLIC PLACE: 0

## 2019-11-22 ENCOUNTER — ANESTHESIA EVENT (OUTPATIENT)
Dept: OPERATING ROOM | Age: 44
End: 2019-11-22
Payer: COMMERCIAL

## 2019-11-25 ENCOUNTER — HOSPITAL ENCOUNTER (OUTPATIENT)
Age: 44
Setting detail: OUTPATIENT SURGERY
Discharge: HOME | End: 2019-11-25
Attending: ORTHOPAEDIC SURGERY | Admitting: ORTHOPAEDIC SURGERY
Payer: COMMERCIAL

## 2019-11-25 ENCOUNTER — ANESTHESIA (OUTPATIENT)
Dept: OPERATING ROOM | Age: 44
End: 2019-11-25
Payer: COMMERCIAL

## 2019-11-25 VITALS
WEIGHT: 315 LBS | HEART RATE: 77 BPM | BODY MASS INDEX: 45.1 KG/M2 | DIASTOLIC BLOOD PRESSURE: 78 MMHG | RESPIRATION RATE: 16 BRPM | TEMPERATURE: 97 F | SYSTOLIC BLOOD PRESSURE: 122 MMHG | OXYGEN SATURATION: 97 % | HEIGHT: 70 IN

## 2019-11-25 VITALS
RESPIRATION RATE: 1 BRPM | OXYGEN SATURATION: 97 % | DIASTOLIC BLOOD PRESSURE: 56 MMHG | SYSTOLIC BLOOD PRESSURE: 119 MMHG

## 2019-11-25 DIAGNOSIS — S83.231D COMPLEX TEAR OF MEDIAL MENISCUS OF RIGHT KNEE AS CURRENT INJURY, SUBSEQUENT ENCOUNTER: Primary | ICD-10-CM

## 2019-11-25 LAB
GLUCOSE BLD-MCNC: 106 MG/DL (ref 70–99)
PERFORMED ON: ABNORMAL

## 2019-11-25 PROCEDURE — 3700000001 HC ADD 15 MINUTES (ANESTHESIA): Performed by: ORTHOPAEDIC SURGERY

## 2019-11-25 PROCEDURE — 3600000014 HC SURGERY LEVEL 4 ADDTL 15MIN: Performed by: ORTHOPAEDIC SURGERY

## 2019-11-25 PROCEDURE — 7100000001 HC PACU RECOVERY - ADDTL 15 MIN: Performed by: ORTHOPAEDIC SURGERY

## 2019-11-25 PROCEDURE — 2709999900 HC NON-CHARGEABLE SUPPLY: Performed by: ORTHOPAEDIC SURGERY

## 2019-11-25 PROCEDURE — 7100000000 HC PACU RECOVERY - FIRST 15 MIN: Performed by: ORTHOPAEDIC SURGERY

## 2019-11-25 PROCEDURE — 6360000002 HC RX W HCPCS: Performed by: ORTHOPAEDIC SURGERY

## 2019-11-25 PROCEDURE — 2500000003 HC RX 250 WO HCPCS: Performed by: NURSE ANESTHETIST, CERTIFIED REGISTERED

## 2019-11-25 PROCEDURE — 2500000003 HC RX 250 WO HCPCS: Performed by: ORTHOPAEDIC SURGERY

## 2019-11-25 PROCEDURE — 3700000000 HC ANESTHESIA ATTENDED CARE: Performed by: ORTHOPAEDIC SURGERY

## 2019-11-25 PROCEDURE — 3600000004 HC SURGERY LEVEL 4 BASE: Performed by: ORTHOPAEDIC SURGERY

## 2019-11-25 PROCEDURE — 6360000002 HC RX W HCPCS: Performed by: NURSE ANESTHETIST, CERTIFIED REGISTERED

## 2019-11-25 PROCEDURE — 2500000003 HC RX 250 WO HCPCS: Performed by: ANESTHESIOLOGY

## 2019-11-25 PROCEDURE — 2580000003 HC RX 258: Performed by: NURSE ANESTHETIST, CERTIFIED REGISTERED

## 2019-11-25 PROCEDURE — 7100000010 HC PHASE II RECOVERY - FIRST 15 MIN: Performed by: ORTHOPAEDIC SURGERY

## 2019-11-25 PROCEDURE — 2580000003 HC RX 258: Performed by: ANESTHESIOLOGY

## 2019-11-25 PROCEDURE — 7100000011 HC PHASE II RECOVERY - ADDTL 15 MIN: Performed by: ORTHOPAEDIC SURGERY

## 2019-11-25 RX ORDER — ASPIRIN 325 MG
325 TABLET, DELAYED RELEASE (ENTERIC COATED) ORAL DAILY
Qty: 30 TABLET | Refills: 0 | Status: SHIPPED | OUTPATIENT
Start: 2019-11-25 | End: 2022-06-14

## 2019-11-25 RX ORDER — ONDANSETRON 2 MG/ML
4 INJECTION INTRAMUSCULAR; INTRAVENOUS PRN
Status: DISCONTINUED | OUTPATIENT
Start: 2019-11-25 | End: 2019-11-25 | Stop reason: HOSPADM

## 2019-11-25 RX ORDER — DIPHENHYDRAMINE HYDROCHLORIDE 50 MG/ML
12.5 INJECTION INTRAMUSCULAR; INTRAVENOUS
Status: DISCONTINUED | OUTPATIENT
Start: 2019-11-25 | End: 2019-11-25 | Stop reason: HOSPADM

## 2019-11-25 RX ORDER — HYDRALAZINE HYDROCHLORIDE 20 MG/ML
5 INJECTION INTRAMUSCULAR; INTRAVENOUS EVERY 10 MIN PRN
Status: DISCONTINUED | OUTPATIENT
Start: 2019-11-25 | End: 2019-11-25 | Stop reason: HOSPADM

## 2019-11-25 RX ORDER — MONTELUKAST SODIUM 10 MG/1
10 TABLET ORAL NIGHTLY
COMMUNITY
End: 2020-02-05

## 2019-11-25 RX ORDER — PROMETHAZINE HYDROCHLORIDE 25 MG/ML
6.25 INJECTION, SOLUTION INTRAMUSCULAR; INTRAVENOUS
Status: DISCONTINUED | OUTPATIENT
Start: 2019-11-25 | End: 2019-11-25 | Stop reason: HOSPADM

## 2019-11-25 RX ORDER — FENTANYL CITRATE 50 UG/ML
INJECTION, SOLUTION INTRAMUSCULAR; INTRAVENOUS PRN
Status: DISCONTINUED | OUTPATIENT
Start: 2019-11-25 | End: 2019-11-25 | Stop reason: SDUPTHER

## 2019-11-25 RX ORDER — DEXAMETHASONE SODIUM PHOSPHATE 4 MG/ML
INJECTION, SOLUTION INTRA-ARTICULAR; INTRALESIONAL; INTRAMUSCULAR; INTRAVENOUS; SOFT TISSUE PRN
Status: DISCONTINUED | OUTPATIENT
Start: 2019-11-25 | End: 2019-11-25 | Stop reason: SDUPTHER

## 2019-11-25 RX ORDER — SODIUM CHLORIDE 0.9 % (FLUSH) 0.9 %
10 SYRINGE (ML) INJECTION EVERY 12 HOURS SCHEDULED
Status: DISCONTINUED | OUTPATIENT
Start: 2019-11-25 | End: 2019-11-25 | Stop reason: HOSPADM

## 2019-11-25 RX ORDER — OXYCODONE HYDROCHLORIDE AND ACETAMINOPHEN 5; 325 MG/1; MG/1
1 TABLET ORAL PRN
Status: DISCONTINUED | OUTPATIENT
Start: 2019-11-25 | End: 2019-11-25 | Stop reason: HOSPADM

## 2019-11-25 RX ORDER — MORPHINE SULFATE 10 MG/ML
1 INJECTION, SOLUTION INTRAMUSCULAR; INTRAVENOUS EVERY 5 MIN PRN
Status: DISCONTINUED | OUTPATIENT
Start: 2019-11-25 | End: 2019-11-25 | Stop reason: HOSPADM

## 2019-11-25 RX ORDER — OXYCODONE HYDROCHLORIDE AND ACETAMINOPHEN 5; 325 MG/1; MG/1
2 TABLET ORAL PRN
Status: DISCONTINUED | OUTPATIENT
Start: 2019-11-25 | End: 2019-11-25 | Stop reason: HOSPADM

## 2019-11-25 RX ORDER — HYDROCODONE BITARTRATE AND ACETAMINOPHEN 5; 325 MG/1; MG/1
1 TABLET ORAL EVERY 6 HOURS PRN
Qty: 20 TABLET | Refills: 0 | Status: SHIPPED | OUTPATIENT
Start: 2019-11-25 | End: 2019-11-30

## 2019-11-25 RX ORDER — LIDOCAINE HYDROCHLORIDE 10 MG/ML
1 INJECTION, SOLUTION EPIDURAL; INFILTRATION; INTRACAUDAL; PERINEURAL
Status: COMPLETED | OUTPATIENT
Start: 2019-11-25 | End: 2019-11-25

## 2019-11-25 RX ORDER — ONDANSETRON 4 MG/1
4 TABLET, FILM COATED ORAL EVERY 4 HOURS PRN
Qty: 20 TABLET | Refills: 0 | Status: SHIPPED | OUTPATIENT
Start: 2019-11-25 | End: 2020-02-20

## 2019-11-25 RX ORDER — PROPOFOL 10 MG/ML
INJECTION, EMULSION INTRAVENOUS PRN
Status: DISCONTINUED | OUTPATIENT
Start: 2019-11-25 | End: 2019-11-25 | Stop reason: SDUPTHER

## 2019-11-25 RX ORDER — KETOROLAC TROMETHAMINE 30 MG/ML
INJECTION, SOLUTION INTRAMUSCULAR; INTRAVENOUS PRN
Status: DISCONTINUED | OUTPATIENT
Start: 2019-11-25 | End: 2019-11-25 | Stop reason: SDUPTHER

## 2019-11-25 RX ORDER — ACETAMINOPHEN 10 MG/ML
1000 INJECTION, SOLUTION INTRAVENOUS ONCE
Status: COMPLETED | OUTPATIENT
Start: 2019-11-25 | End: 2019-11-25

## 2019-11-25 RX ORDER — SODIUM CHLORIDE, SODIUM LACTATE, POTASSIUM CHLORIDE, CALCIUM CHLORIDE 600; 310; 30; 20 MG/100ML; MG/100ML; MG/100ML; MG/100ML
INJECTION, SOLUTION INTRAVENOUS CONTINUOUS
Status: DISCONTINUED | OUTPATIENT
Start: 2019-11-25 | End: 2019-11-25 | Stop reason: HOSPADM

## 2019-11-25 RX ORDER — ONDANSETRON 2 MG/ML
INJECTION INTRAMUSCULAR; INTRAVENOUS PRN
Status: DISCONTINUED | OUTPATIENT
Start: 2019-11-25 | End: 2019-11-25 | Stop reason: SDUPTHER

## 2019-11-25 RX ORDER — MORPHINE SULFATE 10 MG/ML
2 INJECTION, SOLUTION INTRAMUSCULAR; INTRAVENOUS EVERY 5 MIN PRN
Status: DISCONTINUED | OUTPATIENT
Start: 2019-11-25 | End: 2019-11-25 | Stop reason: HOSPADM

## 2019-11-25 RX ORDER — SODIUM CHLORIDE, SODIUM LACTATE, POTASSIUM CHLORIDE, CALCIUM CHLORIDE 600; 310; 30; 20 MG/100ML; MG/100ML; MG/100ML; MG/100ML
INJECTION, SOLUTION INTRAVENOUS CONTINUOUS PRN
Status: DISCONTINUED | OUTPATIENT
Start: 2019-11-25 | End: 2019-11-25 | Stop reason: SDUPTHER

## 2019-11-25 RX ORDER — LIDOCAINE HYDROCHLORIDE AND EPINEPHRINE 10; 10 MG/ML; UG/ML
INJECTION, SOLUTION INFILTRATION; PERINEURAL PRN
Status: DISCONTINUED | OUTPATIENT
Start: 2019-11-25 | End: 2019-11-25 | Stop reason: ALTCHOICE

## 2019-11-25 RX ORDER — LIDOCAINE HYDROCHLORIDE 20 MG/ML
INJECTION, SOLUTION INFILTRATION; PERINEURAL PRN
Status: DISCONTINUED | OUTPATIENT
Start: 2019-11-25 | End: 2019-11-25 | Stop reason: SDUPTHER

## 2019-11-25 RX ORDER — LABETALOL HYDROCHLORIDE 5 MG/ML
5 INJECTION, SOLUTION INTRAVENOUS EVERY 10 MIN PRN
Status: DISCONTINUED | OUTPATIENT
Start: 2019-11-25 | End: 2019-11-25 | Stop reason: HOSPADM

## 2019-11-25 RX ORDER — SODIUM CHLORIDE 0.9 % (FLUSH) 0.9 %
10 SYRINGE (ML) INJECTION PRN
Status: DISCONTINUED | OUTPATIENT
Start: 2019-11-25 | End: 2019-11-25 | Stop reason: HOSPADM

## 2019-11-25 RX ORDER — BUPIVACAINE HYDROCHLORIDE 2.5 MG/ML
INJECTION, SOLUTION INFILTRATION; PERINEURAL PRN
Status: DISCONTINUED | OUTPATIENT
Start: 2019-11-25 | End: 2019-11-25 | Stop reason: ALTCHOICE

## 2019-11-25 RX ORDER — GLYCOPYRROLATE 0.2 MG/ML
INJECTION INTRAMUSCULAR; INTRAVENOUS PRN
Status: DISCONTINUED | OUTPATIENT
Start: 2019-11-25 | End: 2019-11-25 | Stop reason: SDUPTHER

## 2019-11-25 RX ORDER — DOCUSATE SODIUM 100 MG/1
100 CAPSULE, LIQUID FILLED ORAL 2 TIMES DAILY PRN
Qty: 20 CAPSULE | Refills: 0 | Status: SHIPPED | OUTPATIENT
Start: 2019-11-25 | End: 2019-12-25

## 2019-11-25 RX ADMIN — GLYCOPYRROLATE 0.2 MG: 0.2 INJECTION, SOLUTION INTRAMUSCULAR; INTRAVENOUS at 16:07

## 2019-11-25 RX ADMIN — SODIUM CHLORIDE, POTASSIUM CHLORIDE, SODIUM LACTATE AND CALCIUM CHLORIDE: 600; 310; 30; 20 INJECTION, SOLUTION INTRAVENOUS at 14:54

## 2019-11-25 RX ADMIN — DEXAMETHASONE SODIUM PHOSPHATE 8 MG: 4 INJECTION, SOLUTION INTRAMUSCULAR; INTRAVENOUS at 15:50

## 2019-11-25 RX ADMIN — LIDOCAINE HYDROCHLORIDE 50 MG: 20 INJECTION, SOLUTION INFILTRATION; PERINEURAL at 15:45

## 2019-11-25 RX ADMIN — ONDANSETRON 4 MG: 2 INJECTION, SOLUTION INTRAMUSCULAR; INTRAVENOUS at 16:17

## 2019-11-25 RX ADMIN — PROPOFOL 60 MG: 10 INJECTION, EMULSION INTRAVENOUS at 16:23

## 2019-11-25 RX ADMIN — FENTANYL CITRATE 100 MCG: 50 INJECTION INTRAMUSCULAR; INTRAVENOUS at 15:45

## 2019-11-25 RX ADMIN — LIDOCAINE HYDROCHLORIDE 0.1 ML: 10 INJECTION, SOLUTION EPIDURAL; INFILTRATION; INTRACAUDAL; PERINEURAL at 13:46

## 2019-11-25 RX ADMIN — PROPOFOL 340 MG: 10 INJECTION, EMULSION INTRAVENOUS at 15:45

## 2019-11-25 RX ADMIN — GLYCOPYRROLATE 0.2 MG: 0.2 INJECTION, SOLUTION INTRAMUSCULAR; INTRAVENOUS at 16:04

## 2019-11-25 RX ADMIN — KETOROLAC TROMETHAMINE 30 MG: 30 INJECTION, SOLUTION INTRAMUSCULAR at 16:24

## 2019-11-25 RX ADMIN — FENTANYL CITRATE 50 MCG: 50 INJECTION INTRAMUSCULAR; INTRAVENOUS at 16:00

## 2019-11-25 RX ADMIN — Medication 3 G: at 15:37

## 2019-11-25 RX ADMIN — ONDANSETRON 4 MG: 2 INJECTION, SOLUTION INTRAMUSCULAR; INTRAVENOUS at 15:50

## 2019-11-25 RX ADMIN — SODIUM CHLORIDE, POTASSIUM CHLORIDE, SODIUM LACTATE AND CALCIUM CHLORIDE: 600; 310; 30; 20 INJECTION, SOLUTION INTRAVENOUS at 13:48

## 2019-11-25 RX ADMIN — FENTANYL CITRATE 50 MCG: 50 INJECTION INTRAMUSCULAR; INTRAVENOUS at 16:23

## 2019-11-25 RX ADMIN — ACETAMINOPHEN 1000 MG: 10 INJECTION, SOLUTION INTRAVENOUS at 13:49

## 2019-11-25 RX ADMIN — KETOROLAC TROMETHAMINE 30 MG: 30 INJECTION, SOLUTION INTRAMUSCULAR at 16:25

## 2019-11-25 ASSESSMENT — PULMONARY FUNCTION TESTS
PIF_VALUE: 4
PIF_VALUE: 5
PIF_VALUE: 2
PIF_VALUE: 14
PIF_VALUE: 2
PIF_VALUE: 15
PIF_VALUE: 33
PIF_VALUE: 5
PIF_VALUE: 4
PIF_VALUE: 1
PIF_VALUE: 4
PIF_VALUE: 1
PIF_VALUE: 1
PIF_VALUE: 3
PIF_VALUE: 4
PIF_VALUE: 1
PIF_VALUE: 2
PIF_VALUE: 1
PIF_VALUE: 3
PIF_VALUE: 5
PIF_VALUE: 27
PIF_VALUE: 16
PIF_VALUE: 5
PIF_VALUE: 4
PIF_VALUE: 2
PIF_VALUE: 5
PIF_VALUE: 3
PIF_VALUE: 5
PIF_VALUE: 3
PIF_VALUE: 4
PIF_VALUE: 3
PIF_VALUE: 1
PIF_VALUE: 15
PIF_VALUE: 1
PIF_VALUE: 3
PIF_VALUE: 2
PIF_VALUE: 5
PIF_VALUE: 4
PIF_VALUE: 23
PIF_VALUE: 1
PIF_VALUE: 7
PIF_VALUE: 3
PIF_VALUE: 4
PIF_VALUE: 1

## 2019-11-25 ASSESSMENT — PAIN - FUNCTIONAL ASSESSMENT
PAIN_FUNCTIONAL_ASSESSMENT: PREVENTS OR INTERFERES WITH MANY ACTIVE NOT PASSIVE ACTIVITIES
PAIN_FUNCTIONAL_ASSESSMENT: 0-10

## 2019-11-25 ASSESSMENT — PAIN SCALES - GENERAL
PAINLEVEL_OUTOF10: 0
PAINLEVEL_OUTOF10: 0

## 2019-11-25 ASSESSMENT — PAIN DESCRIPTION - DESCRIPTORS: DESCRIPTORS: CONSTANT;SHARP

## 2019-11-26 DIAGNOSIS — S83.241D TEAR OF MEDIAL MENISCUS OF RIGHT KNEE, CURRENT, UNSPECIFIED TEAR TYPE, SUBSEQUENT ENCOUNTER: Primary | ICD-10-CM

## 2019-12-02 ENCOUNTER — HOSPITAL ENCOUNTER (OUTPATIENT)
Dept: PHYSICAL THERAPY | Age: 44
Setting detail: THERAPIES SERIES
Discharge: HOME | End: 2019-12-02
Payer: COMMERCIAL

## 2019-12-02 PROCEDURE — 97161 PT EVAL LOW COMPLEX 20 MIN: CPT

## 2019-12-02 PROCEDURE — 97110 THERAPEUTIC EXERCISES: CPT

## 2019-12-09 ENCOUNTER — HOSPITAL ENCOUNTER (OUTPATIENT)
Dept: PHYSICAL THERAPY | Age: 44
Setting detail: THERAPIES SERIES
Discharge: HOME | End: 2019-12-09
Payer: COMMERCIAL

## 2019-12-09 PROCEDURE — 97110 THERAPEUTIC EXERCISES: CPT

## 2019-12-10 ENCOUNTER — OFFICE VISIT (OUTPATIENT)
Dept: ORTHOPEDIC SURGERY | Age: 44
End: 2019-12-10

## 2019-12-10 VITALS — BODY MASS INDEX: 45.1 KG/M2 | WEIGHT: 315 LBS | HEIGHT: 70 IN

## 2019-12-10 DIAGNOSIS — S83.241D TEAR OF MEDIAL MENISCUS OF RIGHT KNEE, CURRENT, UNSPECIFIED TEAR TYPE, SUBSEQUENT ENCOUNTER: Primary | ICD-10-CM

## 2019-12-10 PROCEDURE — 99024 POSTOP FOLLOW-UP VISIT: CPT | Performed by: ORTHOPAEDIC SURGERY

## 2019-12-16 ENCOUNTER — HOSPITAL ENCOUNTER (OUTPATIENT)
Dept: PHYSICAL THERAPY | Age: 44
Setting detail: THERAPIES SERIES
Discharge: HOME | End: 2019-12-16
Payer: COMMERCIAL

## 2019-12-16 PROCEDURE — 97110 THERAPEUTIC EXERCISES: CPT

## 2019-12-16 PROCEDURE — 97016 VASOPNEUMATIC DEVICE THERAPY: CPT

## 2019-12-20 ENCOUNTER — TELEPHONE (OUTPATIENT)
Dept: ORTHOPEDIC SURGERY | Age: 44
End: 2019-12-20

## 2019-12-23 ENCOUNTER — HOSPITAL ENCOUNTER (OUTPATIENT)
Dept: PHYSICAL THERAPY | Age: 44
Setting detail: THERAPIES SERIES
Discharge: HOME | End: 2019-12-23
Payer: COMMERCIAL

## 2019-12-23 PROCEDURE — 97110 THERAPEUTIC EXERCISES: CPT

## 2019-12-23 PROCEDURE — 97016 VASOPNEUMATIC DEVICE THERAPY: CPT

## 2019-12-30 ENCOUNTER — HOSPITAL ENCOUNTER (OUTPATIENT)
Dept: PHYSICAL THERAPY | Age: 44
Setting detail: THERAPIES SERIES
Discharge: HOME | End: 2019-12-30
Payer: COMMERCIAL

## 2019-12-31 ENCOUNTER — OFFICE VISIT (OUTPATIENT)
Dept: ORTHOPEDIC SURGERY | Age: 44
End: 2019-12-31

## 2019-12-31 VITALS — HEIGHT: 70 IN | WEIGHT: 315 LBS | BODY MASS INDEX: 45.1 KG/M2

## 2019-12-31 DIAGNOSIS — S83.241D TEAR OF MEDIAL MENISCUS OF RIGHT KNEE, CURRENT, UNSPECIFIED TEAR TYPE, SUBSEQUENT ENCOUNTER: Primary | ICD-10-CM

## 2019-12-31 PROCEDURE — 99024 POSTOP FOLLOW-UP VISIT: CPT | Performed by: ORTHOPAEDIC SURGERY

## 2020-02-20 ENCOUNTER — OFFICE VISIT (OUTPATIENT)
Dept: INTERNAL MEDICINE CLINIC | Age: 45
End: 2020-02-20
Payer: COMMERCIAL

## 2020-02-20 VITALS
HEART RATE: 70 BPM | OXYGEN SATURATION: 99 % | DIASTOLIC BLOOD PRESSURE: 74 MMHG | SYSTOLIC BLOOD PRESSURE: 136 MMHG | HEIGHT: 70 IN | WEIGHT: 315 LBS | BODY MASS INDEX: 45.1 KG/M2

## 2020-02-20 LAB
CREATININE URINE: 28.8 MG/DL (ref 39–259)
MICROALBUMIN UR-MCNC: <1.2 MG/DL
MICROALBUMIN/CREAT UR-RTO: ABNORMAL MG/G (ref 0–30)

## 2020-02-20 PROCEDURE — 99396 PREV VISIT EST AGE 40-64: CPT | Performed by: INTERNAL MEDICINE

## 2020-02-20 RX ORDER — SIMVASTATIN 20 MG
TABLET ORAL
Qty: 90 TABLET | Refills: 3 | Status: SHIPPED | OUTPATIENT
Start: 2020-02-20 | End: 2021-02-01 | Stop reason: SDUPTHER

## 2020-02-20 RX ORDER — LISINOPRIL 20 MG/1
TABLET ORAL
Qty: 90 TABLET | Refills: 3 | Status: SHIPPED | OUTPATIENT
Start: 2020-02-20 | End: 2021-02-01 | Stop reason: SDUPTHER

## 2020-02-20 NOTE — PROGRESS NOTES
[facility]  History and Physical  Polina Martinez M.D. Gaston Castellanos  YOB: 1975    Date of Service:  2/20/2020    Chief Complaint:   Gaston Castellanos is a 53 y.o. male who presents for   Chief Complaint   Patient presents with    Annual Exam       HPI: Here for Annual Physical and Follow up. Adjustment d/o with depression: improved on Lexapro 20 mg qd now that he's  from his wife who has a lot of anxiety issues. He's off Cymbalta 60 mg bid due to edema.  No SI or HI.  He has been seeing a counselor every 6-8 weeks.     Insomnia: stable off trazodone 150 mg 1/2-1 qhs prn     Treatment Adherence:   Medication compliance: compliant all of the time   Diet compliance: compliant most of the time   Weight trend: decreasing   Current exercise: walks 3 time(s) per week   Barriers: time constraints   Diabetes Mellitus Type 2: Current symptoms/problems include Actos 45 mg qd and Janumet XR 50/1000 mg 2 qd after sleeve surgery 11/10/14. Home blood sugar records: fasting range: 85-90 AM and 100's PM  Any episodes of hypoglycemia? no   Eye exam current (within one year): yes   Tobacco history: He reports that he quit smoking about 7 years ago. He has never used smokeless tobacco.   Daily Aspirin? No: will start   Known diabetic complications: none   Hypertension: Home blood pressure monitoring: no on Lisinopril 20 mg qd. He is adherent to a low sodium diet. Patient denies chest pain, shortness of breath, headache, lightheadedness, blurred vision, peripheral edema, palpitations, dry cough and fatigue. Antihypertensive medication side effects: no medication side effects noted. Use of agents associated with hypertension: none. Hyperlipidemia: No new myalgias or GI upset on simvastatin (Zocor) 20 mg qhs. Chronic left ear drainage intermittently since he had perforated ear drum 3-4 years ago. No pain or hearing loss. Obesity: Plan to have gastric sleeve in November. diabetes mellitus ([facility] [address]    Perforation of left tympanic membrane    Primary insomnia    Dysthymia       No Known Allergies  Outpatient Medications Marked as Taking for the 2/20/20 encounter (Office Visit) with Lakeshia Franco MD   Medication Sig Dispense Refill    escitalopram (LEXAPRO) 20 MG tablet TAKE 1 TABLET BY MOUTH ONE TIME A DAY 90 tablet 3    montelukast (SINGULAIR) 10 MG tablet TAKE 1 TABLET BY MOUTH ONE TIME A DAY 90 tablet 3    ondansetron (ZOFRAN) 4 MG tablet Take 1 tablet by mouth every 4 hours as needed for Nausea or Vomiting 20 tablet 0    aspirin 325 MG EC tablet Take 1 tablet by mouth daily 30 tablet 0    Multiple Vitamins-Minerals (THERAPEUTIC MULTIVITAMIN-MINERALS) tablet Take 1 tablet by mouth daily      SITagliptin-metFORMIN (JANUMET XR)  MG TB24 per extended release tablet Take 1 tablet by mouth 2 times daily      Blood Glucose Monitoring Suppl (PRODIGY AUTOCODE BLOOD GLUCOSE) w/Device KIT Use as directed 1 kit 0    PRODIGY LANCETS 28G MISC Use to test sugar 3 times weekly or as directed 100 each 3    blood glucose test strips (PRODIGY NO CODING BLOOD GLUC) strip Use to test sugar 3 times weekly or as directed 50 each 3    pioglitazone (ACTOS) 45 MG tablet TAKE 1 TABLET BY MOUTH ONE TIME A DAY 90 tablet 3    simvastatin (ZOCOR) 20 MG tablet TAKE ONE TABLET BY MOUTH NIGHTLY 90 tablet 3    lisinopril (PRINIVIL;ZESTRIL) 20 MG tablet TAKE 1 TABLET BY MOUTH DAILY 90 tablet 3    sildenafil (REVATIO) 20 MG tablet Take 1 tablet by mouth daily as needed (ED) 50 tablet 3    Cholecalciferol (VITAMIN D3) 2000 UNITS CAPS Take 2,000 Units by mouth daily       ferrous sulfate 325 (65 FE) MG tablet TAKE ONE TABLET BY MOUTH TWICE A DAY WITH MEALS 60 tablet 1    Blood Pressure Monitoring (B-D ASSURE BPM/AUTO ARM CUFF) MISC 1 Device by Does not apply route daily.  1 each 0       Past Medical History:   Diagnosis Date    Asthma due to seasonal allergies 10/6/2015    Bruised rib 08/2019  Dental bridge present     Dysthymia 10/8/2019    Gout 2010    Hyperlipidemia     Hypertension     Morbid obesity with BMI of 40.0-44.9, adult ([facility] [address]     Obesity (BMI 30-39.9) 2/12/2015    Type II or unspecified type diabetes mellitus without mention of complication, not stated as uncontrolled     Unspecified sleep apnea     uses cpap     Past Surgical History:   Procedure Laterality Date    CYST REMOVAL      DIAGNOSTIC CARDIAC CATH LAB PROCEDURE      KNEE ARTHROSCOPY Right 11/25/2019    RIGHT KNEE ARTHROSCOPY, PARTIAL MEDIAL MENISCECTOMY performed by Lou Guerra MD at [address] [facility]  11/10/2014    LAPAROSCOPIC    TYMPANOPLASTY N/A 8/26/2019    LEFT MYRINGOPLASTY WITH PAPER PATCH performed by Lucina Mejia DO at [facility] History   Problem Relation Age of Onset    High Blood Pressure Mother     High Cholesterol Mother     Diabetes Mother     Arthritis Mother     Heart Disease Father     High Blood Pressure Father     High Cholesterol Father     High Blood Pressure Sister     High Cholesterol Sister     Alcohol Abuse Paternal Uncle     COPD Paternal Uncle     Alcohol Abuse Maternal Cousin     Alcohol Abuse Paternal Cousin     Cancer Maternal Grandmother 45        Uterine    Cancer Paternal Grandfather         Throat    Alcohol Abuse Paternal Uncle     COPD Paternal Uncle     Alcohol Abuse Paternal Uncle      Social History     Socioeconomic History    Marital status:      Spouse name: Not on file    Number of children: Not on file    Years of education: Not on file    Highest education level: Not on file   Occupational History    Occupation: behavorial health     Employer: [employer] resource strain: Not on file    Food insecurity:     Worry: Not on file     Inability: Not on file    Transportation needs:     Medical: Not on file     Non-medical: Not on file   Tobacco Use    Smoking status: Former Smoker     Packs/day: 1.50     Years: 20.00     Pack years: 30.00     Last attempt to quit: 2007     Years since quittin.1    Smokeless tobacco: Never Used   Substance and Sexual Activity    Alcohol use: Yes     Comment: socially     Drug use: No    Sexual activity: Yes     Partners: Female   Lifestyle    Physical activity:     Days per week: Not on file     Minutes per session: Not on file    Stress: Not on file   Relationships    Social connections:     Talks on phone: Not on file     Gets together: Not on file     Attends [employer]ology service: Not on file     Active member of club or organization: Not on file     Attends meetings of clubs or organizations: Not on file     Relationship status: Not on file    Intimate partner violence:     Fear of current or ex partner: Not on file     Emotionally abused: Not on file     Physically abused: Not on file     Forced sexual activity: Not on file   Other Topics Concern    Not on file   Social History Narrative    Not on file       Review of Systems:  A comprehensive review of systems was negative except for what was noted in the HPI. Physical Exam:   Vitals:    20 0820   BP: 136/74   Pulse: 70   SpO2: 99%   Weight: (!) 324 lb (147 kg)   Height: 5' 10\" (1.778 m)     Body mass index is 46.49 kg/m². Constitutional: He is oriented to person, place, and time. He appears well-developed and well-nourished. No distress. HEENT:   Head: Normocephalic and atraumatic. Right Ear: Tympanic membrane, external ear and ear canal normal.   Left Ear: Tympanic membrane, external ear and ear canal normal.   Mouth/Throat: Oropharynx is clear and moist and mucous membranes are normal. No oropharyngeal exudate or posterior oropharyngeal erythema. He has no cervical adenopathy. Eyes: Conjunctivae and extraocular motions are normal. Pupils are equal, round, and reactive to light. Neck:  Supple. No JVD present. Carotid bruit is not present.  No mass and no thyromegaly present. Cardiovascular: Normal rate, regular rhythm, normal heart sounds and intact distal pulses. Exam reveals no gallop and no friction rub. No murmur heard. Pulmonary/Chest: Effort normal and breath sounds normal. No respiratory distress. He has no wheezes, rhonchi or rales. Abdominal: Soft, non-tender. Bowel sounds and aorta are normal. There is no organomegaly, mass or bruit. Musculoskeletal: Normal range of motion, no synovitis. He exhibits no edema. Neurological: He is alert and oriented to person, place, and time. He has normal reflexes. No cranial nerve deficit. Coordination normal.   Skin: Skin is warm, dry and intact. No suspicious lesions are noted. Psychiatric: He has a normal mood and affect. His speech is normal and behavior is normal. Judgment, cognition and memory are normal.   Foot exam: Normal bilateral sensation. No sores/ulcerations. No toe nail fungus.     Preventive Care:  Health Maintenance   Topic Date Due    Hepatitis B vaccine (1 of 3 - Risk 3-dose series) 11/20/1992    Diabetic foot exam  02/11/2020    Diabetic microalbuminuria test  02/11/2020    Potassium monitoring  02/11/2020    Creatinine monitoring  02/11/2020    Lipid screen  02/11/2020    A1C test (Diabetic or Prediabetic)  08/06/2020    Diabetic retinal exam  03/26/2021    Shingles Vaccine (1 of 2) 11/20/2023    DTaP/Tdap/Td vaccine (2 - Td) 06/27/2027    Flu vaccine  Completed    Pneumococcal 0-64 years Vaccine  Completed    HIV screen  Addressed    Hepatitis A vaccine  Aged Out    Hib vaccine  Aged Out    Meningococcal (ACWY) vaccine  Aged Out        Sexual activity: has sex with females   Last eye exam: 2019, normal  Exercise: aerobics 3 time(s) per week         Preventive plan of care for Roberta Farias        2/20/2020           Preventive Measures Status       Recommendations for screening           Diabetes Screen  Glucose (mg/dL)   Date Value   02/11/2019 98    Test recommended and ordered   Cholesterol Screen  Lab Results   Component Value Date    CHOL 125 02/11/2019    TRIG 88 02/11/2019    HDL 53 02/11/2019    LDLCALC 54 02/11/2019    LDLDIRECT 76 03/13/2013    Test recommended and ordered       Weight: Body mass index is 46.49 kg/m². 5' 10\" (1.778 m)(!) 324 lb (147 kg)  Your BMI is 25 or greater, which indicates that you are overweight        Recommended Immunizations    Immunization History   Administered Date(s) Administered    Influenza A (V5T0-31) Vaccine PF IM 10/26/2009    Influenza Vaccine, unspecified formulation 10/02/2017    Influenza Virus Vaccine 10/20/2014, 10/02/2017, 10/02/2017, 10/03/2018, 10/11/2019    Influenza Whole 10/20/2014    Pneumococcal Conjugate 13-valent ([id number]) 07/29/2014    Pneumococcal Polysaccharide ([id number]) 11/03/2015    Tdap (Boostrix, Adacel) 06/27/2017    Influenza vaccine:  recommended every fall         Other Recommendations ·   See a dentist every 6 months  · Try to get at least 30 minutes of exercise 3-5 days per week  · Always wear a seat belt when traveling in a car  · Always wear a helmet when riding a bicycle or motorcycle  · When exposed to the sun, use a sunscreen that protects against both UVA and UVB radiation with an SPF of 30 or greater- reapply every 2 to 3 hours or after sweating, drying off with a towel, or swimming             Assessment/Plan:  Josemanuel Mcnally was seen today for annual exam.    Diagnoses and all orders for this visit:    Annual physical exam  -     Lipid Panel; Future  -     Comprehensive Metabolic Panel;  Future  -     CBC with Differential; Future  -     Hemoglobin A1C; Future    Hyperlipidemia with target LDL less than 100  -     simvastatin (ZOCOR) 20 MG tablet; TAKE ONE TABLET BY MOUTH NIGHTLY    Essential hypertension  -     lisinopril (PRINIVIL;ZESTRIL) 20 MG tablet; TAKE 1 TABLET BY MOUTH DAILY    Controlled type 2 diabetes mellitus without complication, without long-term current use of insulin (HCC)  Start  empagliflozin (JARDIANCE) 25 MG tablet; Take 25 mg by mouth daily and off Actos  -     Hemoglobin A1C; Future  -     Microalbumin / Creatinine Urine Ratio  -     Diabetic Foot Exam        Return 3 months on diabetes.

## 2020-02-21 ENCOUNTER — HOSPITAL ENCOUNTER (OUTPATIENT)
Age: 45
Discharge: HOME | End: 2020-02-21
Payer: COMMERCIAL

## 2020-02-21 LAB
A/G RATIO: 1.6 (ref 1.1–2.2)
ALBUMIN SERPL-MCNC: 4.5 G/DL (ref 3.4–5)
ALP BLD-CCNC: 59 U/L (ref 40–129)
ALT SERPL-CCNC: 15 U/L (ref 10–40)
ANION GAP SERPL CALCULATED.3IONS-SCNC: 15 MMOL/L (ref 3–16)
AST SERPL-CCNC: 15 U/L (ref 15–37)
BILIRUB SERPL-MCNC: 0.6 MG/DL (ref 0–1)
BUN BLDV-MCNC: 13 MG/DL (ref 7–20)
CALCIUM SERPL-MCNC: 9.7 MG/DL (ref 8.3–10.6)
CHLORIDE BLD-SCNC: 99 MMOL/L (ref 99–110)
CHOLESTEROL, TOTAL: 134 MG/DL (ref 0–199)
CO2: 25 MMOL/L (ref 21–32)
CREAT SERPL-MCNC: 0.7 MG/DL (ref 0.9–1.3)
GFR AFRICAN AMERICAN: >60
GFR NON-AFRICAN AMERICAN: >60
GLOBULIN: 2.8 G/DL
GLUCOSE BLD-MCNC: 86 MG/DL (ref 70–99)
HDLC SERPL-MCNC: 58 MG/DL (ref 40–60)
LDL CHOLESTEROL CALCULATED: 64 MG/DL
POTASSIUM SERPL-SCNC: 4 MMOL/L (ref 3.5–5.1)
SODIUM BLD-SCNC: 139 MMOL/L (ref 136–145)
TOTAL PROTEIN: 7.3 G/DL (ref 6.4–8.2)
TRIGL SERPL-MCNC: 62 MG/DL (ref 0–150)
VLDLC SERPL CALC-MCNC: 12 MG/DL

## 2020-02-21 PROCEDURE — 36415 COLL VENOUS BLD VENIPUNCTURE: CPT

## 2020-02-21 PROCEDURE — 80053 COMPREHEN METABOLIC PANEL: CPT

## 2020-02-21 PROCEDURE — 83036 HEMOGLOBIN GLYCOSYLATED A1C: CPT

## 2020-02-21 PROCEDURE — 80061 LIPID PANEL: CPT

## 2020-02-22 LAB
ESTIMATED AVERAGE GLUCOSE: 128.4 MG/DL
HBA1C MFR BLD: 6.1 %

## 2020-04-06 RX ORDER — SITAGLIPTIN AND METFORMIN HYDROCHLORIDE 1000; 50 MG/1; MG/1
TABLET, FILM COATED, EXTENDED RELEASE ORAL
Qty: 180 TABLET | Refills: 0 | Status: SHIPPED | OUTPATIENT
Start: 2020-04-06 | End: 2020-05-19 | Stop reason: SDUPTHER

## 2020-04-29 ENCOUNTER — TELEPHONE (OUTPATIENT)
Dept: BARIATRICS/WEIGHT MGMT | Age: 45
End: 2020-04-29

## 2020-04-29 NOTE — LETTER
Elma Connor MD  [facility]  [address], [address] [facility], [address]  [phone]  Phone  [phone]  Fax    [place],    We noticed that you missed your last appointment. We are interested in your progress and how you have been feeling! As you know, it is important to complete regular follow-up visits to monitor your health after surgery and to insure the best success of your procedure. Please call the office today at [phone] to schedule an appointment. We look forward to seeing you!     Elma Connor MD  [facility]  [address], [address] [facility], [address] [facility]  [phone]  Phone  [phone]  Fax

## 2020-05-07 ENCOUNTER — PATIENT MESSAGE (OUTPATIENT)
Dept: PHARMACY | Facility: CLINIC | Age: 45
End: 2020-05-07

## 2020-05-07 ENCOUNTER — TELEPHONE (OUTPATIENT)
Dept: PHARMACY | Facility: CLINIC | Age: 45
End: 2020-05-07

## 2020-05-19 ENCOUNTER — TELEMEDICINE (OUTPATIENT)
Dept: INTERNAL MEDICINE CLINIC | Age: 45
End: 2020-05-19
Payer: COMMERCIAL

## 2020-05-19 VITALS — HEIGHT: 70 IN | WEIGHT: 315 LBS | BODY MASS INDEX: 45.1 KG/M2

## 2020-05-19 PROCEDURE — 99213 OFFICE O/P EST LOW 20 MIN: CPT | Performed by: INTERNAL MEDICINE

## 2020-05-19 RX ORDER — EMPAGLIFLOZIN 25 MG/1
25 TABLET, FILM COATED ORAL DAILY
Qty: 90 TABLET | Refills: 3 | Status: SHIPPED
Start: 2020-05-19 | End: 2021-03-29 | Stop reason: ALTCHOICE

## 2020-05-19 RX ORDER — SITAGLIPTIN AND METFORMIN HYDROCHLORIDE 1000; 50 MG/1; MG/1
TABLET, FILM COATED, EXTENDED RELEASE ORAL
Qty: 180 TABLET | Refills: 0 | Status: SHIPPED | OUTPATIENT
Start: 2020-05-19 | End: 2020-08-17 | Stop reason: SDUPTHER

## 2020-05-19 NOTE — PROGRESS NOTES
Date of Service:  5/19/2020    Chief Complaint:      Chief Complaint   Patient presents with    Diabetes       HPI:  Jensen Nelson is a 53 y.o. Patient is being seen Virtually for Video Audio interactive visit [facility]. Pt has consented to the Telehealth virtual visit. Adjustment d/o with depression: improved on Lexapro 20 mg qd now that he's  from his wife who has a lot of anxiety issues.  He's off Cymbalta 60 mg bid due to edema.  No SI or HI.  He has been seeing a counselor every 6-8 weeks.     Insomnia: stable on trazodone 150 mg prn couple of weeks     Treatment Adherence:   Medication compliance: compliant all of the time   Diet compliance: compliant most of the time   Weight trend: decreasing   Current exercise: walks 3 time(s) per week   Barriers: time constraints   Diabetes Mellitus Type 2: Current symptoms/problems include Jardiance 25 mg qd and Janumet XR 50/1000 mg 2 qd and off Actos. He's had sleeve surgery 11/10/14. Home blood sugar records: fasting range: 85-90 AM and 100's PM  Any episodes of hypoglycemia? no   Eye exam current (within one year): yes   Tobacco history: He reports that he quit smoking about 7 years ago. He has never used smokeless tobacco.   Daily Aspirin? No: will start   Known diabetic complications: none   Hypertension: Home blood pressure monitoring: no on Lisinopril 20 mg qd. He is adherent to a low sodium diet. Patient denies chest pain, shortness of breath, headache, lightheadedness, blurred vision, peripheral edema, palpitations, dry cough and fatigue. Antihypertensive medication side effects: no medication side effects noted. Use of agents associated with hypertension: none. Hyperlipidemia: No new myalgias or GI upset on simvastatin (Zocor) 20 mg qhs. Chronic left ear drainage intermittently since he had perforated ear drum 3-4 years ago. No pain or hearing loss. Obesity: Plan to have gastric sleeve in November.    Anemia: not JANUMET XR  MG TB24 per extended release tablet TAKE TWO TABLETS BY MOUTH EVERY  tablet 0    empagliflozin (JARDIANCE) 25 MG tablet Take 25 mg by mouth daily 90 tablet 0    simvastatin (ZOCOR) 20 MG tablet TAKE ONE TABLET BY MOUTH NIGHTLY 90 tablet 3    lisinopril (PRINIVIL;ZESTRIL) 20 MG tablet TAKE 1 TABLET BY MOUTH DAILY 90 tablet 3    escitalopram (LEXAPRO) 20 MG tablet TAKE 1 TABLET BY MOUTH ONE TIME A DAY 90 tablet 3    montelukast (SINGULAIR) 10 MG tablet TAKE 1 TABLET BY MOUTH ONE TIME A DAY 90 tablet 3    aspirin 325 MG EC tablet Take 1 tablet by mouth daily 30 tablet 0    Multiple Vitamins-Minerals (THERAPEUTIC MULTIVITAMIN-MINERALS) tablet Take 1 tablet by mouth daily      Blood Glucose Monitoring Suppl (PRODIGY AUTOCODE BLOOD GLUCOSE) w/Device KIT Use as directed 1 kit 0    PRODIGY LANCETS 28G MISC Use to test sugar 3 times weekly or as directed 100 each 3    blood glucose test strips (PRODIGY NO CODING BLOOD GLUC) strip Use to test sugar 3 times weekly or as directed 50 each 3    sildenafil (REVATIO) 20 MG tablet Take 1 tablet by mouth daily as needed (ED) 50 tablet 3    Cholecalciferol (VITAMIN D3) 2000 UNITS CAPS Take 2,000 Units by mouth daily       Blood Pressure Monitoring (B-D ASSURE BPM/AUTO ARM CUFF) MISC 1 Device by Does not apply route daily. 1 each 0         Review of Systems: 14 systems were negative except of what was stated on HPI    Nursing note and vitals reviewed. Vitals:    05/19/20 0813   Weight: (!) 324 lb (147 kg)   Height: 5' 10\" (1.778 m)     Wt Readings from Last 3 Encounters:   05/19/20 (!) 324 lb (147 kg)   02/20/20 (!) 324 lb (147 kg)   12/31/19 (!) 324 lb 1.2 oz (147 kg)     BP Readings from Last 3 Encounters:   02/20/20 136/74   11/25/19 (!) 119/56   11/25/19 122/78     Body mass index is 46.49 kg/m². Constitutional: Patient appears well-developed and well-nourished. No distress. Head: Normocephalic and atraumatic.    Skin: No rash or

## 2020-05-20 ENCOUNTER — TELEPHONE (OUTPATIENT)
Dept: ADMINISTRATIVE | Age: 45
End: 2020-05-20

## 2020-05-20 NOTE — TELEPHONE ENCOUNTER
TRIG 62 2020    HDL 58 2020    LDLCHOLESTEROL 47 2014    LDLCALC 64 2020    LDLDIRECT 76 2013     ALT   Date Value Ref Range Status   2020 15 10 - 40 U/L Final     AST   Date Value Ref Range Status   2020 15 15 - 37 U/L Final     The 10-year ASCVD risk score (Mini Foley et al., 2013) is: 2.5%    Values used to calculate the score:      Age: 53 years      Sex: Male      Is Non- : No      Diabetic: Yes      Tobacco smoker: No      Systolic Blood Pressure: 042 mmHg      Is BP treated: Yes      HDL Cholesterol: 58 mg/dL      Total Cholesterol: 134 mg/dL     Lab Results   Component Value Date    CREATININE 0.7 (L) 2020     CrCl cannot be calculated (Unknown ideal weight.). Immunizations:  Immunization History   Administered Date(s) Administered    Hepatitis B Adult (Recombivax HB) 2001, 2001, 2002    Influenza A (O1I6-97) Vaccine PF IM 10/26/2009    Influenza Vaccine, unspecified formulation 10/02/2017    Influenza Virus Vaccine 10/20/2014, 10/02/2017, 10/02/2017, 10/03/2018, 10/11/2019    Influenza Whole 10/20/2014    MMR 2001    Pneumococcal Conjugate 13-valent ([id number]) 2014    Pneumococcal Polysaccharide ([id number]) 2015    Tdap (Boostrix, Adacel) 2017      Social History:  Social History     Tobacco Use    Smoking status: Former Smoker     Packs/day: 1.50     Years: 20.00     Pack years: 30.00     Last attempt to quit: 2007     Years since quittin.3    Smokeless tobacco: Never Used   Substance Use Topics    Alcohol use: Yes     Comment: socially      ASSESSMENT:  Initial Program Requirements (Y indicates has completed for the year, N indicates needs to be completed by 2020):  Yes - OV with provider for DM (1st)  Yes - A1c ()     Ongoing Program Requirements (Y indicates has completed for the year, N indicates needs to be completed by 20):   No - OV with provider for DM (2nd)  No - ACC/diabetes educator visit (if A1c over 8%)  No - A1c (2nd)  Yes - Lipid panel  Yes - Urine microalbumin  Yes - Pneumococcal vaccination: 2015  No - Influenza vaccination for Fall 2020  Yes - Medication adherence over 70%  Yes - On statin or contraindication(s) simvastatin  Yes - On ACEi/ARB or contraindication(s) lisinopril     Current medications eligible for copay waiver, up to $600.00, through [facility]:  - Jardiance, lisinopril, pioglitazone, Janumet XR, simvastatin   - Generic ([facility]  pharmacy-stocked) insulin syringes and pen needles  - Agamatrix or Prodigy meter and supplies  - Dexcom G6 supplies     Diabetes Care:   - Glycemic Goal: <6.5%. Is at blood glucose goal.  - Duplicate MOA: n/a  - Reduce Pill Painter: already on combination pill  - Therapy Optimization: all therapies are maxed out   - Adherent to ACEi/ARB and/or statin: reports full adherence   - Tests in the afternoon but fasting   - Fasting blood sugars between 120-130   - Patient states his doctor wants him to test daily but he normally tests 2-3 times per week. Encouraged him to test daily and informed him I will request enough new testing supplies to be used daily. - works night shift, takes \"morning\" meds when he gets up    Other Considerations:  - Blood Pressure Goal: BP less than 140/90 mmHg due to history of DM: Is at blood pressure goal.   - Lipids: Patient has a 10-yr ASCVD risk of >7.5% with DM and is therefore a candidate for moderate-intensity statin therapy based on updated guidelines. - Smoking status: quit date 2007    PLAN:  - DM program gaps identified:   · Initial requirements: Requirements met   · Ongoing requirements: OV with provider for DM (2nd), A1c (2nd) and Influenza vaccination for 2425-2064   - Education to patient: Discussed general issues about diabetes pathophysiology and management.   Counseling at today's visit: resume taking aspirin, check blood sugars daily as directed by provider. .  - Follow up: PCP for identified gaps or as scheduled below  - Upcoming appointments:   Future Appointments   Date Time Provider Adrien Johnson   5/21/2020  8:30 AM SCHEDULE, [facility] None   9/21/2020  3:10 PM Joshua Franco MD [address]     Pat Phipps, [address] [facility], PharmD  [id number]   5/21/2020 8:40 AM  Office: [phone]  Department: [phone], option [phone]    =====================================================================    CLINICAL PHARMACY CONSULT: MED RECONCILIATION/REVIEW ADDENDUM    For Pharmacy Admin Tracking Only    PHSO: Yes  Total # of Interventions Recommended: 2  - New Order #: 1 New Medication Order Reason(s): Needs Additional Medication Therapy  - Updated Order #: 1 Updated Order Reason(s):  Other  Recommended intervention potential cost savings: 1  Total Interventions Accepted: 2  Time Spent (min): 30    Pat Phipps, PharmD  [address]

## 2020-05-21 ENCOUNTER — SCHEDULED TELEPHONE ENCOUNTER (OUTPATIENT)
Dept: PHARMACY | Facility: CLINIC | Age: 45
End: 2020-05-21

## 2020-05-21 RX ORDER — BLOOD SUGAR DIAGNOSTIC
1 STRIP MISCELLANEOUS DAILY
Qty: 100 EACH | Refills: 3 | Status: SHIPPED | OUTPATIENT
Start: 2020-05-21

## 2020-05-21 RX ORDER — BLOOD-GLUCOSE METER
1 KIT MISCELLANEOUS SEE ADMIN INSTRUCTIONS
Qty: 1 KIT | Refills: 0 | Status: SHIPPED | OUTPATIENT
Start: 2020-05-21

## 2020-05-21 RX ORDER — BLOOD-GLUCOSE CONTROL, NORMAL
1 EACH MISCELLANEOUS ONCE
Qty: 1 EACH | Refills: 0 | Status: SHIPPED | OUTPATIENT
Start: 2020-05-21

## 2020-05-21 RX ORDER — LANCETS 33 GAUGE
1 EACH MISCELLANEOUS DAILY
Qty: 100 EACH | Refills: 3 | Status: SHIPPED | OUTPATIENT
Start: 2020-05-21

## 2020-05-22 ENCOUNTER — HOSPITAL ENCOUNTER (OUTPATIENT)
Age: 45
Discharge: HOME | End: 2020-05-22
Payer: COMMERCIAL

## 2020-05-22 PROCEDURE — 36415 COLL VENOUS BLD VENIPUNCTURE: CPT

## 2020-05-22 PROCEDURE — 83036 HEMOGLOBIN GLYCOSYLATED A1C: CPT

## 2020-05-23 LAB
ESTIMATED AVERAGE GLUCOSE: 157.1 MG/DL
HBA1C MFR BLD: 7.1 %

## 2020-05-24 RX ORDER — PIOGLITAZONEHYDROCHLORIDE 30 MG/1
30 TABLET ORAL DAILY
Qty: 90 TABLET | Refills: 0 | Status: SHIPPED | OUTPATIENT
Start: 2020-05-24 | End: 2020-08-17 | Stop reason: SDUPTHER

## 2020-06-10 ENCOUNTER — OFFICE VISIT (OUTPATIENT)
Dept: ORTHOPEDIC SURGERY | Age: 45
End: 2020-06-10
Payer: COMMERCIAL

## 2020-06-10 VITALS — BODY MASS INDEX: 45.1 KG/M2 | WEIGHT: 315 LBS | HEIGHT: 70 IN

## 2020-06-10 PROCEDURE — L3040 FT ARCH SUPRT PREMOLD LONGIT: HCPCS | Performed by: PODIATRIST

## 2020-06-10 PROCEDURE — 99203 OFFICE O/P NEW LOW 30 MIN: CPT | Performed by: PODIATRIST

## 2020-06-10 RX ORDER — PREDNISONE 10 MG/1
TABLET ORAL
Qty: 26 TABLET | Refills: 0 | Status: SHIPPED | OUTPATIENT
Start: 2020-06-10 | End: 2020-11-30

## 2020-06-10 NOTE — PROGRESS NOTES
HISTORY OF PRESENT ILLNESS: This is an initial visit for a 57-year-old male with a chief complaint of right plantar heel pain. He has been having pain consistently for the past couple of months however, he has been having symptoms on and off for the past several years. This time he became more concerned because he noticed a lot more swelling around the heel and ankle with the pain. He is having more pain in the right ankle as well. There is no history of trauma. The pain is worsened with activity and relieved by rest.  Pain is often present in the mornings and after short periods of     inactivity. Initially, after movement, the pain is sharp but seems to resolve for a short time with activity, only to return with prolonged activity. The patient denies any shooting pain to the toes or on the bottom of the foot. No treatment has been tried other than rest     and occasional anti-inflammatories. FAMILY HISTORY: Documented in chart. SOCIAL HISTORY: Documented in chart. REVIEW OF SYSTEMS: The patient denies any fever, chills, or night sweats. The patient also denies developing any type of rash. The patient denies any problems with cardiovascular, pulmonary, gastrointestinal, neurologic, urologic, genitourinary, psychiatric, dermatologic, and HEENT systems. Family History, Social History, and Review of Systems were reviewed from patient history form dated on 6/10/2020 and available in the patient's chart under the MEDIA tab. PHYSICAL EXAMINATION:  The patient is alert and orientated x3. The area of greatest palpable tenderness is at the plantar medial aspect of the right heel. There is no pain with side-to-side compression of the heel. There are no paresthesias elicited at the tibial nerve, over the tarsal tunnel. Pedal pulses are palpable bilateral.  The sensation is grossly intact bilateral. There is no erythema, ecchymosis, or edema noted.   The remainder of the exam is

## 2020-07-01 ENCOUNTER — OFFICE VISIT (OUTPATIENT)
Dept: ORTHOPEDIC SURGERY | Age: 45
End: 2020-07-01
Payer: COMMERCIAL

## 2020-07-01 VITALS — BODY MASS INDEX: 45.1 KG/M2 | WEIGHT: 315 LBS | HEIGHT: 70 IN

## 2020-07-01 PROCEDURE — 99213 OFFICE O/P EST LOW 20 MIN: CPT | Performed by: PODIATRIST

## 2020-07-01 NOTE — PROGRESS NOTES
HISTORY OF PRESENT ILLNESS: This is return visit for right plantar heel pain. There has been greater than 50% improvement. He is doing very well up until this morning when he got out of bed and felt some sharp pain but that resolved within a few steps. PHYSICAL EXAMINATION: He currently has minimal palpable tenderness at the plantar aspect of the right heel. There is no pain with side-to-side  compression of the heel, bilateral. There are no paresthesias elicited at the tibial nerve,  over the tarsal tunnel, bilateral. Sensation is otherwise grossly intact, bilateral. There is no  erythema, ecchymosis, or edema noted, bilateral. Palpable pedal pulses are present, bilateral. The  remainder of the exam is unremarkable. ASSESSMENT: Plantar fasciitis and Tenosynovitis of the foot, right with right foot pain. PLAN: I given her a stretching exercise to work on. We discussed the importance of continue with the activity modification until symptoms resolve. He will continue with the foot orthoses. If he continues having pain beyond 3 weeks then he will let me know.

## 2020-07-08 ENCOUNTER — TELEPHONE (OUTPATIENT)
Dept: INTERNAL MEDICINE CLINIC | Age: 45
End: 2020-07-08

## 2020-07-08 ENCOUNTER — PATIENT MESSAGE (OUTPATIENT)
Dept: INTERNAL MEDICINE CLINIC | Age: 45
End: 2020-07-08

## 2020-07-08 RX ORDER — TRAZODONE HYDROCHLORIDE 100 MG/1
150 TABLET ORAL NIGHTLY
Qty: 45 TABLET | Refills: 0 | Status: SHIPPED | OUTPATIENT
Start: 2020-07-08 | End: 2021-02-01 | Stop reason: ALTCHOICE

## 2020-07-08 NOTE — TELEPHONE ENCOUNTER
From: Christina Victoria  To: Gaston Duncan MD  Sent: 7/8/2020 3:04 AM EDT  Subject: Prescription Question    I'm out of lisinopril and dont have my empty bottle to get refilled. Also almost out of trazodone. You've never ordered that for me. Been taking 150's that were leftover from a previous prescriber.     Thank you

## 2020-07-08 NOTE — TELEPHONE ENCOUNTER
Sending message to patient letting him know he should have refills on lisinopril. Please advise if you will fill trazodone. Pended rx.

## 2020-07-08 NOTE — TELEPHONE ENCOUNTER
Pharmacy calling asking for verbal approval to change QTY from a 30 day supply to a 90 day supply since mail order pharmacy.

## 2020-07-09 NOTE — TELEPHONE ENCOUNTER
LVM requesting patient callback to inform if he would like 90 days  traZODone (DESYREL) 100 MG tablet

## 2020-08-17 ENCOUNTER — TELEMEDICINE (OUTPATIENT)
Dept: INTERNAL MEDICINE CLINIC | Age: 45
End: 2020-08-17
Payer: COMMERCIAL

## 2020-08-17 PROCEDURE — 99214 OFFICE O/P EST MOD 30 MIN: CPT | Performed by: INTERNAL MEDICINE

## 2020-08-17 PROCEDURE — 3051F HG A1C>EQUAL 7.0%<8.0%: CPT | Performed by: INTERNAL MEDICINE

## 2020-08-17 RX ORDER — PIOGLITAZONEHYDROCHLORIDE 30 MG/1
30 TABLET ORAL DAILY
Qty: 90 TABLET | Refills: 3 | Status: SHIPPED
Start: 2020-08-17 | End: 2020-08-31 | Stop reason: DRUGHIGH

## 2020-08-17 RX ORDER — SITAGLIPTIN AND METFORMIN HYDROCHLORIDE 1000; 50 MG/1; MG/1
TABLET, FILM COATED, EXTENDED RELEASE ORAL
Qty: 180 TABLET | Refills: 3 | Status: SHIPPED
Start: 2020-08-17 | End: 2021-03-29 | Stop reason: ALTCHOICE

## 2020-08-17 NOTE — PROGRESS NOTES
Date of Service:  8/17/2020    Chief Complaint:      Chief Complaint   Patient presents with    Diabetes       HPI:  Brittany James is a 53 y.o. Pursuant to the emergency declaration under the [facility], [facility] authority and the [employer] Act, this Virtual  Video Visit was conducted, with patient's consent, to reduce the patient's risk of exposure to COVID-19 and provide continuity of care. Service is  provided through a video synchronous discussion virtually to substitute for in-person clinic visit with the patient being at home and Dr. Jessica Berry being at home. Adjustment d/o with depression: improved on Lexapro 20 mg qd now that he's  from his wife who has a lot of anxiety issues.  He's off Cymbalta 60 mg bid due to edema.  No SI or HI.  He has been seeing a counselor every 6-8 weeks.     Insomnia: stable on trazodone 150 mg prn couple of weeks     Treatment Adherence:   Medication compliance: compliant all of the time   Diet compliance: compliant most of the time   Weight trend: decreasing   Current exercise: walks 3 time(s) per week   Barriers: time constraints   Diabetes Mellitus Type 2: Current symptoms/problems include Jardiance 25 mg qd and Janumet XR 50/1000 mg 2 qd and back on Actos. He's had sleeve surgery 11/10/14. Home blood sugar records: fasting range: 85-90 AM and 100's PM  Any episodes of hypoglycemia? no   Eye exam current (within one year): yes   Tobacco history: He reports that he quit smoking about 7 years ago. He has never used smokeless tobacco.   Daily Aspirin? No: will start   Known diabetic complications: none   Hypertension: Home blood pressure monitoring: no on Lisinopril 20 mg qd. He is adherent to a low sodium diet. Patient denies chest pain, shortness of breath, headache, lightheadedness, blurred vision, peripheral edema, palpitations, dry cough and fatigue.

## 2020-08-18 ENCOUNTER — TELEPHONE (OUTPATIENT)
Dept: INTERNAL MEDICINE CLINIC | Age: 45
End: 2020-08-18

## 2020-08-19 NOTE — TELEPHONE ENCOUNTER
According to covermymeds response \"Your patient will pay 100% of a discounted price for this medication. Any amount the patient pays will not apply to their deductible or out-of-pocket expenses. \"    Waiting for fax response from [employer]

## 2020-08-27 ENCOUNTER — HOSPITAL ENCOUNTER (OUTPATIENT)
Age: 45
Discharge: HOME | End: 2020-08-27
Payer: COMMERCIAL

## 2020-08-27 PROCEDURE — 83036 HEMOGLOBIN GLYCOSYLATED A1C: CPT

## 2020-08-27 PROCEDURE — 36415 COLL VENOUS BLD VENIPUNCTURE: CPT

## 2020-08-28 LAB
ESTIMATED AVERAGE GLUCOSE: 159.9 MG/DL
HBA1C MFR BLD: 7.2 %

## 2020-08-31 RX ORDER — PIOGLITAZONEHYDROCHLORIDE 45 MG/1
45 TABLET ORAL DAILY
Qty: 90 TABLET | Refills: 3 | Status: SHIPPED
Start: 2020-08-31 | End: 2021-01-06 | Stop reason: SINTOL

## 2020-08-31 NOTE — RESULT ENCOUNTER NOTE
Inform patient:  Your diabetes has not improved so increase your Actos 30 mg to 1 1/2 daily and I have sent in a new script for 45 mg daily.

## 2020-09-15 LAB
CATARACTS: POSITIVE
DIABETIC RETINOPATHY: NEGATIVE
DIABETIC RETINOPATHY: NEGATIVE
GLAUCOMA: NEGATIVE
INTRAOCULAR PRESSURE EYE: NORMAL
VISUAL ACUITY DISTANCE LEFT EYE: NORMAL
VISUAL ACUITY DISTANCE RIGHT EYE: NORMAL

## 2020-10-27 ENCOUNTER — HOSPITAL ENCOUNTER (EMERGENCY)
Age: 45
Discharge: HOME | End: 2020-10-27
Payer: COMMERCIAL

## 2020-10-27 VITALS
BODY MASS INDEX: 42.95 KG/M2 | TEMPERATURE: 98.4 F | HEIGHT: 70 IN | RESPIRATION RATE: 16 BRPM | DIASTOLIC BLOOD PRESSURE: 66 MMHG | HEART RATE: 85 BPM | OXYGEN SATURATION: 98 % | WEIGHT: 300 LBS | SYSTOLIC BLOOD PRESSURE: 122 MMHG

## 2020-10-27 PROCEDURE — 90715 TDAP VACCINE 7 YRS/> IM: CPT | Performed by: PHYSICIAN ASSISTANT

## 2020-10-27 PROCEDURE — 90471 IMMUNIZATION ADMIN: CPT | Performed by: PHYSICIAN ASSISTANT

## 2020-10-27 PROCEDURE — 99282 EMERGENCY DEPT VISIT SF MDM: CPT

## 2020-10-27 PROCEDURE — 6360000002 HC RX W HCPCS: Performed by: PHYSICIAN ASSISTANT

## 2020-10-27 PROCEDURE — 12011 RPR F/E/E/N/L/M 2.5 CM/<: CPT

## 2020-10-27 RX ADMIN — TETANUS TOXOID, REDUCED DIPHTHERIA TOXOID AND ACELLULAR PERTUSSIS VACCINE, ADSORBED 0.5 ML: 5; 2.5; 8; 8; 2.5 SUSPENSION INTRAMUSCULAR at 21:41

## 2020-10-27 ASSESSMENT — ENCOUNTER SYMPTOMS
ABDOMINAL PAIN: 0
SINUS PAIN: 0
EYE DISCHARGE: 0
NAUSEA: 0
DIARRHEA: 0
CONSTIPATION: 0
SORE THROAT: 0
EYE REDNESS: 0
RHINORRHEA: 0
SINUS PRESSURE: 0
SHORTNESS OF BREATH: 0
CHEST TIGHTNESS: 0
VOMITING: 0
COUGH: 0

## 2020-10-27 NOTE — LETTER
[place] [facility]  [address] [address]  Phone: [phone]               October 27, 2020    Patient: Lilian Ramos   YOB: 1975   Date of Visit: 10/27/2020       To Whom It May Concern:    Christa Landers was seen and treated in our emergency department on 10/27/2020. He may return to work on 10/27/2020, as long as he can wear dressing to protect laceration to his right ear.  .      Sincerely,       Letitia Elliott PA-C         Signature:__________________________________
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
## 2020-11-02 ENCOUNTER — TELEMEDICINE (OUTPATIENT)
Dept: INTERNAL MEDICINE CLINIC | Age: 45
End: 2020-11-02
Payer: COMMERCIAL

## 2020-11-02 PROBLEM — F32.A ANXIETY AND DEPRESSION: Status: ACTIVE | Noted: 2019-10-08

## 2020-11-02 PROBLEM — F41.9 ANXIETY AND DEPRESSION: Status: ACTIVE | Noted: 2019-10-08

## 2020-11-02 PROCEDURE — 99213 OFFICE O/P EST LOW 20 MIN: CPT | Performed by: INTERNAL MEDICINE

## 2020-11-02 RX ORDER — BUPROPION HYDROCHLORIDE 150 MG/1
150 TABLET ORAL EVERY MORNING
Qty: 30 TABLET | Refills: 0 | Status: SHIPPED
Start: 2020-11-02 | End: 2020-11-30 | Stop reason: DRUGHIGH

## 2020-11-02 NOTE — PROGRESS NOTES
Date of Service:  11/2/2020    Chief Complaint:      Chief Complaint   Patient presents with    Medication Problem     wants to change lexapro       HPI:  Chandana Abad is a 53 y.o. Pursuant to the emergency declaration under the [facility], [facility] authority and the [employer] Act, this Virtual  Video Visit was conducted, with patient's consent, to reduce the patient's risk of exposure to COVID-19 and provide continuity of care. Service is  provided through a video synchronous discussion virtually to substitute for in-person clinic visit with the patient being at home and Dr. Caden Alicea being at home. Adjustment d/o with depression: He complains of worsening anxiety/depression despite taking Lexapro 20 mg qd. He has no motivation to do things and cancel plans. He can't shut his mind down and dwell on things for a long time.  He's off Cymbalta 60 mg bid due to edema.  No SI or HI.  He has been seeing a counselor every 6-8 weeks.     Insomnia: stable on trazodone 150 mg prn couple of weeks     Treatment Adherence:   Medication compliance: compliant all of the time   Diet compliance: compliant most of the time   Weight trend: decreasing   Current exercise: walks 3 time(s) per week   Barriers: time constraints   Diabetes Mellitus Type 2: Current symptoms/problems include Jardiance 25 mg qd and Janumet XR 50/1000 mg 2 qd and back on Actos.  He's had sleeve surgery 11/10/14. Home blood sugar records: fasting range: 85-90 AM and 100's PM  Any episodes of hypoglycemia? no   Eye exam current (within one year): yes   Tobacco history: He reports that he quit smoking about 7 years ago. He has never used smokeless tobacco.   Daily Aspirin? No: will start   Known diabetic complications: none   Hypertension: Home blood pressure monitoring: no on Lisinopril 20 mg qd. He is adherent to a low sodium diet.  Patient denies chest pain, shortness of breath, headache, lightheadedness, blurred vision, peripheral edema, palpitations, dry cough and fatigue. Antihypertensive medication side effects: no medication side effects noted. Use of agents associated with hypertension: none. Hyperlipidemia: No new myalgias or GI upset on simvastatin (Zocor) 20 mg qhs. Chronic left ear drainage intermittently since he had perforated ear drum 3-4 years ago. No pain or hearing loss. Obesity: Plan to have gastric sleeve in November. Anemia: not been on any iron pill but is taking a multivitamin. Vit D3: stable on Vit D3 2000 U qd. ED associated with DM: stable on Revatio 20 mg 1 prn  Exercise/allergies induce Asthma:  Stable on Singulair 10 mg qhs and have not need Flonase.     Lab Results   Component Value Date    LABA1C 7.2 08/27/2020    LABA1C 7.1 05/22/2020    LABA1C 6.1 02/21/2020    LABMICR <1.20 02/20/2020     Lab Results   Component Value Date     02/21/2020    K 4.0 02/21/2020    CL 99 02/21/2020    CO2 25 02/21/2020    BUN 13 02/21/2020    CREATININE 0.7 (L) 02/21/2020    GLUCOSE 86 02/21/2020    CALCIUM 9.7 02/21/2020     Lab Results   Component Value Date    CHOL 134 02/21/2020    TRIG 62 02/21/2020    HDL 58 02/21/2020    HDL 43 05/23/2012    LDLCALC 64 02/21/2020    LDLDIRECT 76 03/13/2013     Lab Results   Component Value Date    ALT 15 02/21/2020    AST 15 02/21/2020     Lab Results   Component Value Date    TSH 2.02 08/24/2016    TSH 0.93 06/12/2015     Lab Results   Component Value Date    WBC 4.1 02/11/2019    HGB 12.7 (L) 02/11/2019    HCT 36.9 (L) 02/11/2019    MCV 86.6 02/11/2019     02/11/2019     Lab Results   Component Value Date    INR 0.84 (L) 03/13/2013      No results found for: PSA   No results found for: LABURIC     Patient Active Problem List   Diagnosis    Hyperlipidemia with target LDL less than 100    Essential hypertension    Type 2 diabetes mellitus without complication, without long-term current use of insulin ([facility] [address]    MICHAEL (obstructive sleep apnea)    S/P laparoscopic sleeve gastrectomy    Morbid obesity with BMI of 40.0-44.9, adult ([facility] [address]    Asthma due to seasonal allergies    Erectile dysfunction associated with type 2 diabetes mellitus (HCC)    Perforation of left tympanic membrane    Primary insomnia    Dysthymia       No Known Allergies  Outpatient Medications Marked as Taking for the 11/2/20 encounter (Telemedicine) with Helen Franco MD   Medication Sig Dispense Refill    pioglitazone (ACTOS) 45 MG tablet Take 1 tablet by mouth daily 90 tablet 3    SITagliptin-metFORMIN (JANUMET XR)  MG TB24 per extended release tablet TAKE TWO TABLETS BY MOUTH EVERY  tablet 3    traZODone (DESYREL) 100 MG tablet Take 1.5 tablets by mouth nightly 45 tablet 0    blood glucose test strips (AGAMATRIX JAZZ TEST) strip 1 each by In Vitro route daily Use to test blood sugar daily. Please dispense 1 box of 100. 100 each 3    Blood Glucose Monitoring Suppl (AGAMATRIX JAZZ WIRELESS 2) w/Device KIT 1 kit by Does not apply route See Admin Instructions 1 kit 0    AgaMatrix Ultra-Thin Lancets MISC 1 box by Does not apply route daily Use to test blood sugar once daily.  100 each 3    simvastatin (ZOCOR) 20 MG tablet TAKE ONE TABLET BY MOUTH NIGHTLY 90 tablet 3    lisinopril (PRINIVIL;ZESTRIL) 20 MG tablet TAKE 1 TABLET BY MOUTH DAILY 90 tablet 3    escitalopram (LEXAPRO) 20 MG tablet TAKE 1 TABLET BY MOUTH ONE TIME A DAY 90 tablet 3    montelukast (SINGULAIR) 10 MG tablet TAKE 1 TABLET BY MOUTH ONE TIME A DAY 90 tablet 3    aspirin 325 MG EC tablet Take 1 tablet by mouth daily 30 tablet 0    Multiple Vitamins-Minerals (THERAPEUTIC MULTIVITAMIN-MINERALS) tablet Take 1 tablet by mouth daily      Blood Glucose Monitoring Suppl (PRODIGY AUTOCODE BLOOD GLUCOSE) w/Device KIT Use as directed 1 kit 0    PRODIGY LANCETS 28G MISC Use to test sugar 3 times weekly or as directed 100 each 3    sildenafil (REVATIO) 20 MG tablet Take 1 tablet by mouth daily as needed (ED) 50 tablet 3    Cholecalciferol (VITAMIN D3) 2000 UNITS CAPS Take 2,000 Units by mouth daily       Blood Pressure Monitoring (B-D ASSURE BPM/AUTO ARM CUFF) MISC 1 Device by Does not apply route daily. 1 each 0         Review of Systems: 14 systems were negative except of what was stated on HPI    Nursing note and vitals reviewed. There were no vitals filed for this visit. Wt Readings from Last 3 Encounters:   10/27/20 300 lb (136.1 kg)   07/01/20 (!) 320 lb 1.7 oz (145.2 kg)   06/10/20 (!) 320 lb (145.2 kg)     BP Readings from Last 3 Encounters:   10/27/20 122/66   02/20/20 136/74   11/25/19 (!) 119/56     There is no height or weight on file to calculate BMI. Constitutional: Patient appears well-developed and well-nourished. No distress. Head: Normocephalic and atraumatic. Skin: No rash or erythema. Psychiatric: Normal mood and affect. Behavior is normal.       Assessment/Plan:  Ricardo Oliva was seen today for medication problem. Diagnoses and all orders for this visit:    Anxiety and depression  -     buPROPion (WELLBUTRIN XL) 150 MG extended release tablet; Take 1 tablet by mouth every morning        Return Nov 30 at 2:40 Aniety/depression.

## 2020-11-30 ENCOUNTER — TELEMEDICINE (OUTPATIENT)
Dept: INTERNAL MEDICINE CLINIC | Age: 45
End: 2020-11-30
Payer: COMMERCIAL

## 2020-11-30 PROCEDURE — 99214 OFFICE O/P EST MOD 30 MIN: CPT | Performed by: INTERNAL MEDICINE

## 2020-11-30 PROCEDURE — 3051F HG A1C>EQUAL 7.0%<8.0%: CPT | Performed by: INTERNAL MEDICINE

## 2020-11-30 RX ORDER — BUPROPION HYDROCHLORIDE 300 MG/1
300 TABLET ORAL EVERY MORNING
Qty: 90 TABLET | Refills: 0 | Status: SHIPPED | OUTPATIENT
Start: 2020-11-30 | End: 2021-02-01 | Stop reason: SDUPTHER

## 2020-11-30 RX ORDER — GLIMEPIRIDE 1 MG/1
1 TABLET ORAL 2 TIMES DAILY
Qty: 60 TABLET | Refills: 0 | Status: SHIPPED | OUTPATIENT
Start: 2020-11-30 | End: 2021-01-06 | Stop reason: SDUPTHER

## 2020-11-30 NOTE — PROGRESS NOTES
Date of Service:  11/30/2020    Chief Complaint:      Chief Complaint   Patient presents with    Anxiety    Depression       HPI:  Anita Valladares is a 50 y.o. Pursuant to the emergency declaration under the [facility] [address] authority and the [employer] Act, this Virtual  Video Visit was conducted, with patient's consent, to reduce the patient's risk of exposure to COVID-19 and provide continuity of care. Service is  provided through a video synchronous discussion virtually to substitute for in-person clinic visit with the patient being at home and Dr. Jesusita Mathis being at home. He complain of swelling and gained 25 # in couple of weeks. He feels like he's wearing a wet suite and fell swelling all over. He tried getting on a treatmill and got real SOB after 30 mins but wasn't sure if it's bc he's just restarting to exercise or the weight gain. He has been getting ACEVEDO easily. Adjustment d/o with depression:much improve on Wellbutrin  mg qam and  Lexapro 20 mg qd. He only has intermittent depression couple of times a week. He's off Cymbalta 60 mg bid due to edema.  No SI or HI.  He has been seeing a counselor every 6-8 weeks.     Insomnia: stable on trazodone 150 mg prn couple of weeks     Treatment Adherence:   Medication compliance: compliant all of the time   Diet compliance: compliant most of the time   Weight trend: decreasing   Current exercise: walks 3 time(s) per week   Barriers: time constraints   Diabetes Mellitus Type 2: Current symptoms/problems include Jardiance 25 mg qd and Janumet XR 50/1000 mg 2 qd and Actos 45 mg qd.  He's had sleeve surgery 11/10/14. Home blood sugar records: fasting range: 85-90 AM and 100's PM  Any episodes of hypoglycemia? no   Eye exam current (within one year): yes   Tobacco history: He reports that he quit smoking about 7 years ago.  He has never used smokeless tobacco.   Daily Aspirin? No: will start   Known diabetic complications: none   Hypertension: Home blood pressure monitoring: no on Lisinopril 20 mg qd. He is adherent to a low sodium diet. Patient denies chest pain, shortness of breath, headache, lightheadedness, blurred vision, peripheral edema, palpitations, dry cough and fatigue. Antihypertensive medication side effects: no medication side effects noted. Use of agents associated with hypertension: none. Hyperlipidemia: No new myalgias or GI upset on simvastatin (Zocor) 20 mg qhs. Chronic left ear drainage intermittently since he had perforated ear drum 3-4 years ago. No pain or hearing loss. Obesity: Plan to have gastric sleeve in November. Anemia: not been on any iron pill but is taking a multivitamin due to constipation. Vit D3: stable on Vit D3 2000 U qd. ED associated with DM: stable on Revatio 20 mg 1 prn  Exercise/allergies induce Asthma:  Stable on Singulair 10 mg qhs and have not need Flonase.     Lab Results   Component Value Date    LABA1C 7.1 12/02/2020    LABA1C 7.2 08/27/2020    LABA1C 7.1 05/22/2020    LABMICR <1.20 02/20/2020     Lab Results   Component Value Date     02/21/2020    K 4.0 02/21/2020    CL 99 02/21/2020    CO2 25 02/21/2020    BUN 13 02/21/2020    CREATININE 0.7 (L) 02/21/2020    GLUCOSE 86 02/21/2020    CALCIUM 9.7 02/21/2020     Lab Results   Component Value Date    CHOL 134 02/21/2020    TRIG 62 02/21/2020    HDL 58 02/21/2020    HDL 43 05/23/2012    LDLCALC 64 02/21/2020    LDLDIRECT 76 03/13/2013     Lab Results   Component Value Date    ALT 15 02/21/2020    AST 15 02/21/2020     Lab Results   Component Value Date    TSH 2.02 08/24/2016    TSH 0.93 06/12/2015     Lab Results   Component Value Date    WBC 4.1 02/11/2019    HGB 12.7 (L) 02/11/2019    HCT 36.9 (L) 02/11/2019    MCV 86.6 02/11/2019     02/11/2019     Lab Results   Component Value Date    INR 0.84 (L) 03/13/2013      No results found for: PSA No results found for: [facility]     Patient Active Problem List   Diagnosis    Hyperlipidemia with target LDL less than 100    Essential hypertension    Type 2 diabetes mellitus without complication, without long-term current use of insulin ([facility] [address]    MICHAEL (obstructive sleep apnea)    S/P laparoscopic sleeve gastrectomy    Morbid obesity with BMI of 40.0-44.9, adult ([facility] [address]    Asthma due to seasonal allergies    Erectile dysfunction associated with type 2 diabetes mellitus ([facility] [address]    Perforation of left tympanic membrane    Primary insomnia    Anxiety and depression       No Known Allergies  Outpatient Medications Marked as Taking for the 11/30/20 encounter (Telemedicine) with Baldev Franco MD   Medication Sig Dispense Refill    buPROPion (WELLBUTRIN XL) 150 MG extended release tablet Take 1 tablet by mouth every morning 30 tablet 0    pioglitazone (ACTOS) 45 MG tablet Take 1 tablet by mouth daily 90 tablet 3    SITagliptin-metFORMIN (JANUMET XR)  MG TB24 per extended release tablet TAKE TWO TABLETS BY MOUTH EVERY  tablet 3    traZODone (DESYREL) 100 MG tablet Take 1.5 tablets by mouth nightly 45 tablet 0    predniSONE (DELTASONE) 10 MG tablet 3 po bid x 2 days, then 2 po bid x 2 days, then 1 po bid x 2 days, then 1 po qd x 2 days 26 tablet 0    blood glucose test strips (AGAMATRIX JAZZ TEST) strip 1 each by In Vitro route daily Use to test blood sugar daily. Please dispense 1 box of 100. 100 each 3    Blood Glucose Monitoring Suppl (AGAMATRIX JAZZ WIRELESS 2) w/Device KIT 1 kit by Does not apply route See Admin Instructions 1 kit 0    AgaMatrix Ultra-Thin Lancets MISC 1 box by Does not apply route daily Use to test blood sugar once daily.  100 each 3    empagliflozin (JARDIANCE) 25 MG tablet Take 25 mg by mouth daily 90 tablet 3    simvastatin (ZOCOR) 20 MG tablet TAKE ONE TABLET BY MOUTH NIGHTLY 90 tablet 3    lisinopril (PRINIVIL;ZESTRIL) 20 MG tablet TAKE 1 TABLET BY MOUTH DAILY 90 tablet 3    escitalopram (LEXAPRO) 20 MG tablet TAKE 1 TABLET BY MOUTH ONE TIME A DAY 90 tablet 3    montelukast (SINGULAIR) 10 MG tablet TAKE 1 TABLET BY MOUTH ONE TIME A DAY 90 tablet 3    Multiple Vitamins-Minerals (THERAPEUTIC MULTIVITAMIN-MINERALS) tablet Take 1 tablet by mouth daily      Blood Glucose Monitoring Suppl (PRODIGY AUTOCODE BLOOD GLUCOSE) w/Device KIT Use as directed 1 kit 0    PRODIGY LANCETS 28G MISC Use to test sugar 3 times weekly or as directed 100 each 3    sildenafil (REVATIO) 20 MG tablet Take 1 tablet by mouth daily as needed (ED) 50 tablet 3    Cholecalciferol (VITAMIN D3) 2000 UNITS CAPS Take 2,000 Units by mouth daily       Blood Pressure Monitoring (B-D ASSURE BPM/AUTO ARM CUFF) MISC 1 Device by Does not apply route daily. 1 each 0         Review of Systems: 14 systems were negative except of what was stated on HPI    EXAM done 12/2/2020    Nursing note and vitals reviewed. There were no vitals filed for this visit. Wt Readings from Last 3 Encounters:   12/02/20 (!) 331 lb (150.1 kg)   10/27/20 300 lb (136.1 kg)   07/01/20 (!) 320 lb 1.7 oz (145.2 kg)     BP Readings from Last 3 Encounters:   12/02/20 130/74   10/27/20 122/66   02/20/20 136/74     There is no height or weight on file to calculate BMI. Constitutional: Patient appears well-developed and well-nourished. No distress. Head: Normocephalic and atraumatic. Neck: Normal range of motion. Neck supple. No thyroidmegaly. Cardiovascular: Normal rate, regular rhythm, normal heart sounds and intact distal pulses. Pulmonary/Chest: Effort normal and breath sounds normal. No stridor. No respiratory distress. No wheezes and no rales. Abdominal: Soft. Bowel sounds are normal. No distension and no mass. No tenderness. No rebound and no guarding. Musculoskeletal: No edema and no tenderness. Skin: No rash or erythema. Psychiatric: Normal mood and affect.  Behavior is normal.   1+ pitting edema bilateral MARTIN    Assessment/Plan:  Richar Nj was seen today for anxiety and depression. Diagnoses and all orders for this visit:    Type 2 diabetes mellitus without complication, without long-term current use of insulin ([facility])  -     POCT glycosylated hemoglobin (Hb A1C); Future  Start  glimepiride (AMARYL) 1 MG tablet; Take 1 tablet by mouth 1-2 times daily  D/C Actos    Anxiety and depression  Increase buPROPion (WELLBUTRIN XL) 300 MG extended release tablet;  Take 1 tablet by mouth every morning    Generalized edema  stop Acots and see if improve    Insomnia  Stable on trazodone prn    Return keep 2/1 Fasting Physical.

## 2020-12-02 ENCOUNTER — NURSE ONLY (OUTPATIENT)
Dept: INTERNAL MEDICINE CLINIC | Age: 45
End: 2020-12-02
Payer: COMMERCIAL

## 2020-12-02 VITALS
BODY MASS INDEX: 45.1 KG/M2 | DIASTOLIC BLOOD PRESSURE: 74 MMHG | TEMPERATURE: 98.2 F | HEIGHT: 70 IN | OXYGEN SATURATION: 99 % | HEART RATE: 64 BPM | WEIGHT: 315 LBS | SYSTOLIC BLOOD PRESSURE: 130 MMHG

## 2020-12-02 LAB — HBA1C MFR BLD: 7.1 %

## 2020-12-02 PROCEDURE — 83036 HEMOGLOBIN GLYCOSYLATED A1C: CPT | Performed by: INTERNAL MEDICINE

## 2020-12-30 ENCOUNTER — TELEPHONE (OUTPATIENT)
Dept: PHARMACY | Facility: CLINIC | Age: 45
End: 2020-12-30

## 2020-12-30 NOTE — TELEPHONE ENCOUNTER
Attempting to reach patient to schedule a pharmacist appointment to discuss medications for the 2021 Diabetes Management Program.    No answer. Left VM on home/cell TAD: Please call back at [phone] Option #7 to retrieve the above message.       [facility]

## 2021-01-04 NOTE — TELEPHONE ENCOUNTER
2nd attempt to contact this patient regarding the previous message**    CLINICAL PHARMACY: [facility]  Patient unavailable at the time of call. Left following message on home TAD: please call back at toll-free [phone] option 7 to retrieve previous message. Letter mailed to patient.     [facility], toll free: [phone], option 7

## 2021-01-06 ENCOUNTER — TELEMEDICINE (OUTPATIENT)
Dept: INTERNAL MEDICINE CLINIC | Age: 46
End: 2021-01-06
Payer: COMMERCIAL

## 2021-01-06 DIAGNOSIS — E78.5 HYPERLIPIDEMIA WITH TARGET LDL LESS THAN 100: ICD-10-CM

## 2021-01-06 DIAGNOSIS — E66.01 MORBID OBESITY WITH BMI OF 40.0-44.9, ADULT (HCC): ICD-10-CM

## 2021-01-06 DIAGNOSIS — F32.A ANXIETY AND DEPRESSION: ICD-10-CM

## 2021-01-06 DIAGNOSIS — E11.9 TYPE 2 DIABETES MELLITUS WITHOUT COMPLICATION, WITHOUT LONG-TERM CURRENT USE OF INSULIN (HCC): Primary | ICD-10-CM

## 2021-01-06 DIAGNOSIS — I10 ESSENTIAL HYPERTENSION: ICD-10-CM

## 2021-01-06 DIAGNOSIS — F41.9 ANXIETY AND DEPRESSION: ICD-10-CM

## 2021-01-06 DIAGNOSIS — F51.01 PRIMARY INSOMNIA: ICD-10-CM

## 2021-01-06 DIAGNOSIS — J45.909 ASTHMA DUE TO SEASONAL ALLERGIES: ICD-10-CM

## 2021-01-06 PROCEDURE — 99214 OFFICE O/P EST MOD 30 MIN: CPT | Performed by: INTERNAL MEDICINE

## 2021-01-06 RX ORDER — GLIMEPIRIDE 1 MG/1
1 TABLET ORAL 2 TIMES DAILY
Qty: 180 TABLET | Refills: 0 | Status: SHIPPED
Start: 2021-01-06 | End: 2021-01-12 | Stop reason: ALTCHOICE

## 2021-01-06 NOTE — PROGRESS NOTES
Date of Service:  1/6/2021    Chief Complaint:      Chief Complaint   Patient presents with    Hypertension    Diabetes    Hyperlipidemia       HPI:  Hilton Centeno is a 50 y.o. Pursuant to the emergency declaration under the [facility], [facility] authority and the [employer] Act, this Virtual  Video Visit was conducted, with patient's consent, to reduce the patient's risk of exposure to COVID-19 and provide continuity of care. Service is  provided through a video synchronous discussion virtually to substitute for in-person clinic visit with the patient being at home and Dr. Reese Pastor being at home.       Treatment Adherence:   Medication compliance: compliant all of the time   Diet compliance: compliant most of the time   Weight trend: decreasing   Current exercise: walks 3 time(s) per week   Barriers: time constraints   Diabetes Mellitus Type 2: Current symptoms/problems include: none. He has not been taking Jardiance 25 mg qd since August.  He's still on Janumet XR 50/1000 mg 2 qd and Amaryl 1 mg qd. Davonte Cifuentes has lost weight since getting off Actos. Home blood sugar records: fasting range: 140-150 AM and 90's PM  Any episodes of hypoglycemia? no   Eye exam current (within one year): yes   Tobacco history: He reports that he quit smoking about 7 years ago. He has never used smokeless tobacco.   Daily Aspirin? No: will start   Known diabetic complications: none   Hypertension: Home blood pressure monitoring: no on Lisinopril 20 mg qd. He is adherent to a low sodium diet. Patient denies chest pain, shortness of breath, headache, lightheadedness, blurred vision, peripheral edema, palpitations, dry cough and fatigue. Antihypertensive medication side effects: no medication side effects noted. Use of agents associated with hypertension: none.    Hyperlipidemia: No new myalgias or GI upset on simvastatin (Zocor) 20 mg qhs. Adjustment d/o with depression:  much improve and rarely feel depress on Wellbutrin  mg qam and  Lexapro 20 mg qd. He's off Cymbalta 60 mg bid due to edema.  No SI or HI.  He has been seeing a counselor every 6-8 weeks. Insomnia: stable on trazodone 150 mg prn couple of weeks  Exercise/allergies induce Asthma:  Stable on Singulair 10 mg qhs and have not need Flonase. ED associated with DM: stable on Revatio 20 mg 1 prn    Chronic left ear drainage intermittently since he had perforated ear drum 3-4 years ago. No pain or hearing loss. Obesity: He's had sleeve surgery 11/10/14. Anemia: not been on any iron pill but is taking a multivitamin due to constipation. Vit D3: stable on Vit D3 2000 U qd.        Lab Results   Component Value Date    LABA1C 7.1 12/02/2020    LABA1C 7.2 08/27/2020    LABA1C 7.1 05/22/2020    LABMICR <1.20 02/20/2020     Lab Results   Component Value Date     02/21/2020    K 4.0 02/21/2020    CL 99 02/21/2020    CO2 25 02/21/2020    BUN 13 02/21/2020    CREATININE 0.7 (L) 02/21/2020    GLUCOSE 86 02/21/2020    CALCIUM 9.7 02/21/2020     Lab Results   Component Value Date    CHOL 134 02/21/2020    TRIG 62 02/21/2020    HDL 58 02/21/2020    HDL 43 05/23/2012    LDLCALC 64 02/21/2020    LDLDIRECT 76 03/13/2013     Lab Results   Component Value Date    ALT 15 02/21/2020    AST 15 02/21/2020     Lab Results   Component Value Date    TSH 2.02 08/24/2016    TSH 0.93 06/12/2015     Lab Results   Component Value Date    WBC 4.1 02/11/2019    HGB 12.7 (L) 02/11/2019    HCT 36.9 (L) 02/11/2019    MCV 86.6 02/11/2019     02/11/2019     Lab Results   Component Value Date    INR 0.84 (L) 03/13/2013      No results found for: PSA   No results found for: LABURIC     Patient Active Problem List   Diagnosis    Hyperlipidemia with target LDL less than 100    Essential hypertension    Type 2 diabetes mellitus without complication, without long-term current use of insulin ([address]  MICHAEL (obstructive sleep apnea)    S/P laparoscopic sleeve gastrectomy    Morbid obesity with BMI of 40.0-44.9, adult (HCC)    Asthma due to seasonal allergies    Erectile dysfunction associated with type 2 diabetes mellitus (HCC)    Perforation of left tympanic membrane    Primary insomnia    Anxiety and depression       No Known Allergies  Outpatient Medications Marked as Taking for the 1/6/21 encounter (Telemedicine) with Lennox Kaushal Franco MD   Medication Sig Dispense Refill    glimepiride (AMARYL) 1 MG tablet Take 1 tablet by mouth 2 times daily 60 tablet 0    buPROPion (WELLBUTRIN XL) 300 MG extended release tablet Take 1 tablet by mouth every morning 90 tablet 0    pioglitazone (ACTOS) 45 MG tablet Take 1 tablet by mouth daily 90 tablet 3    SITagliptin-metFORMIN (JANUMET XR)  MG TB24 per extended release tablet TAKE TWO TABLETS BY MOUTH EVERY  tablet 3    traZODone (DESYREL) 100 MG tablet Take 1.5 tablets by mouth nightly 45 tablet 0    blood glucose test strips (SmartzerZZ TEST) strip 1 each by In Vitro route daily Use to test blood sugar daily. Please dispense 1 box of 100. 100 each 3    Blood Glucose Monitoring Suppl (SmartzerZZ WIRELESS 2) w/Device KIT 1 kit by Does not apply route See Admin Instructions 1 kit 0    AgaMatrix Ultra-Thin Lancets MISC 1 box by Does not apply route daily Use to test blood sugar once daily.  100 each 3    empagliflozin (JARDIANCE) 25 MG tablet Take 25 mg by mouth daily 90 tablet 3    simvastatin (ZOCOR) 20 MG tablet TAKE ONE TABLET BY MOUTH NIGHTLY 90 tablet 3    lisinopril (PRINIVIL;ZESTRIL) 20 MG tablet TAKE 1 TABLET BY MOUTH DAILY 90 tablet 3    escitalopram (LEXAPRO) 20 MG tablet TAKE 1 TABLET BY MOUTH ONE TIME A DAY 90 tablet 3    montelukast (SINGULAIR) 10 MG tablet TAKE 1 TABLET BY MOUTH ONE TIME A DAY 90 tablet 3    Multiple Vitamins-Minerals (THERAPEUTIC MULTIVITAMIN-MINERALS) tablet Take 1 tablet by mouth daily  Blood Glucose Monitoring Suppl (PRODIGY AUTOCODE BLOOD GLUCOSE) w/Device KIT Use as directed 1 kit 0    PRODIGY LANCETS 28G MISC Use to test sugar 3 times weekly or as directed 100 each 3    sildenafil (REVATIO) 20 MG tablet Take 1 tablet by mouth daily as needed (ED) 50 tablet 3    Cholecalciferol (VITAMIN D3) 2000 UNITS CAPS Take 2,000 Units by mouth daily       Blood Pressure Monitoring (B-D ASSURE BPM/AUTO ARM CUFF) MISC 1 Device by Does not apply route daily. 1 each 0         Review of Systems: 14 systems were negative except of what was stated on HPI    Nursing note and vitals reviewed. There were no vitals filed for this visit. Wt Readings from Last 3 Encounters:   12/02/20 (!) 331 lb (150.1 kg)   10/27/20 300 lb (136.1 kg)   07/01/20 (!) 320 lb 1.7 oz (145.2 kg)     BP Readings from Last 3 Encounters:   12/02/20 130/74   10/27/20 122/66   02/20/20 136/74     There is no height or weight on file to calculate BMI. Constitutional: Patient appears well-developed and well-nourished. No distress. Head: Normocephalic and atraumatic. Skin: No rash or erythema. Psychiatric: Normal mood and affect. Behavior is normal.       Assessment/Plan:  Candelario Snell was seen today for hypertension, diabetes and hyperlipidemia. Diagnoses and all orders for this visit:    Type 2 diabetes mellitus without complication, without long-term current use of insulin ([facility])  -     Discontinue: glimepiride (AMARYL) 1 MG tablet; Take 1 tablet by mouth 2 times daily  Restart Jardiance    Anxiety and depression    Essential hypertension    Hyperlipidemia with target LDL less than 100    Primary insomnia    Asthma due to seasonal allergies    Morbid obesity with BMI of 40.0-44.9, adult (HCC)    Stable and continue on current medications.       Return keep 2/1 Fasting Physical.

## 2021-01-12 ENCOUNTER — SCHEDULED TELEPHONE ENCOUNTER (OUTPATIENT)
Dept: PHARMACY | Facility: CLINIC | Age: 46
End: 2021-01-12

## 2021-01-12 NOTE — TELEPHONE ENCOUNTER
[facility],[address] Floor Employee Diabetes Program - Be Well With Diabetes  =================================================================  Wale Traore is a 50 y.o. male enrolled in the [address] [place] [address] Diabetes Program. Patient provided Deni Barrier with verbal consent to remain in the program for this year. Medications:  Medication Sig    glimepiride (AMARYL) 1 MG tablet Take 1 tablet by mouth 2 times daily  - thinks was told to do 1/2 tab and than 1.5 tabs? He will check with provider  - awaiting Rx from [facility]    buPROPion (WELLBUTRIN XL) 300 MG extended release tablet Take 1 tablet by mouth every morning    SITagliptin-metFORMIN (JANUMET XR)  MG TB24 per extended release tablet TAKE TWO TABLETS BY MOUTH EVERY DAY    traZODone (DESYREL) 100 MG tablet Take 1.5 tablets by mouth nightly    blood glucose test strips (AGAMATRIX JAZZ TEST) strip 1 each by In Vitro route daily Use to test blood sugar daily. Please dispense 1 box of 100.  Blood Glucose Monitoring Suppl (AGAMATRIX JAZZ WIRELESS 2) w/Device KIT 1 kit by Does not apply route See Admin Instructions    Blood Glucose Calibration (AGAMATRIX CONTROL) Normal SOLN 1 Bottle by In Vitro route once for 1 dose    AgaMatrix Ultra-Thin Lancets MISC 1 box by Does not apply route daily Use to test blood sugar once daily.  empagliflozin (JARDIANCE) 25 MG tablet Take 25 mg by mouth daily   - this was the one she tried to switch from [employer]omark[employer] to Tervela, then went back to Dinomarket.  He has not been taking this  - refill history 2/21/20, 5/21/20 x 90 ds    simvastatin (ZOCOR) 20 MG tablet TAKE ONE TABLET BY MOUTH NIGHTLY    lisinopril (PRINIVIL;ZESTRIL) 20 MG tablet TAKE 1 TABLET BY MOUTH DAILY    escitalopram (LEXAPRO) 20 MG tablet TAKE 1 TABLET BY MOUTH ONE TIME A DAY    montelukast (SINGULAIR) 10 MG tablet TAKE 1 TABLET BY MOUTH ONE TIME A DAY    aspirin 325 MG EC tablet Take 1 tablet by mouth daily    Multiple Vitamins-Minerals Systolic Blood Pressure: 426 mmHg      Is BP treated: Yes      HDL Cholesterol: 58 mg/dL      Total Cholesterol: 134 mg/dL     Lab Results   Component Value Date    CREATININE 0.7 (L) 2020     CrCl cannot be calculated (Patient's most recent lab result is older than the maximum 120 days allowed. ). Component      Latest Ref Rng & Units 2020           8:02 AM  8:59 AM  8:08 AM   GFR Non-      >60 >60 >60 >60       Immunizations:  Immunization History   Administered Date(s) Administered    Hepatitis B Adult (Recombivax HB) 2001, 2001, 2002    Influenza A ([id number]) Vaccine PF IM 10/26/2009    Influenza Vaccine, unspecified formulation 10/02/2017    Influenza Virus Vaccine 10/20/2014, 10/02/2017, 10/02/2017, 10/03/2018, 10/11/2019, 10/01/2020    Influenza Whole 10/20/2014    MMR 2001    Pneumococcal Conjugate 13-valent ([id number]) 2014    Pneumococcal Polysaccharide ([id number]) 2015    Tdap (Boostrix, Adacel) 2017, 10/27/2020      Social History:  Social History     Tobacco Use    Smoking status: Former Smoker     Packs/day: 1.50     Years: 20.00     Pack years: 30.00     Quit date: 2007     Years since quittin.0    Smokeless tobacco: Never Used   Substance Use Topics    Alcohol use: Yes     Comment: socially      ASSESSMENT:  Initial Program Requirements (Y indicates has completed for the year, N indicates needs to be completed by 2021):  Yes - OV with provider for DM (1st)  No - A1c (1st)     Ongoing Program Requirements (Y indicates has completed for the year, N indicates needs to be completed by 2021):   No - OV with provider for DM (2nd)  Yes - ACC/diabetes educator visit (will need if A1c becomes over 8%)  No - A1c (2nd)  No - Lipid panel  No - Urine microalbumin  Yes - Pneumococcal vaccination: up to date  No - Influenza vaccination for 2021  No - Medication adherence over 70% - PDC 98% per last report  Yes - On statin or contraindication(s) simvastatin  Yes - On ACEi/ARB or contraindication(s) lisinopril     Formulary Medication Review:  Non-formulary or medications with cost-effective alternatives: none identified. Current medications eligible for copay waiver, up to $600, through [employer]:  - aspirin (with prescription), glimepiride, lisinopril, simvastatin, Janumet  - Agamatrix or Prodigy meter and supplies    Diabetes Care:   - Glycemic Goal: <7.0%. Is around blood glucose goal. Current regimen Janumet -2000 mg daily ( mg x 2) daily, glimepiride 1 mg BID. Pioglitazone stopped @8/31/20; glimepiride started @11/30/20. Per pt Jardiance stopped @summer 2020 (Sara Stratton filled 2/21/20, 5/21/20 x 90 ds - pt reports is no longer taking). Appears adherent to monthly fills of Janumet. Other Considerations:  - Blood Pressure Goal: BP less than 140/90 mmHg due to history of DM: Is at blood pressure goal. On ACEi. UACR and eGFR WNL. - Lipids: LDL less than 70 mg/dL on current statin regimen (simvastatin 20 mg daily - moderate intensity statin). - Smoking status: former smoker    PLAN:  - Consideration(s) for provider:   · Will clarify regarding Sara Stratton - is on med list but patient has not been taking. Last A1c 7.1% 12/2/2020 and he is increasing glimepiride dose after recent visit with PCP 1/6/2021. He is also trying to check more sugars to report to PCP given his med changes. - DM program gaps identified:   · Initial requirements: A1c (1st)   · Ongoing requirements: OV with provider for DM (2nd), ACC/diabetes educator visit (if A1c over 8%), A1c (2nd), Lipid panel, Urine microalbumin, Influenza vaccination for [id number] and Medication adherence over 70%   - Education to patient: as above and:   · Pt expressed concern with multiple PCP visits and labs needed for DM program requirements.  Discussed DM program requirements based off of ADA Standards of Care, if he and provider feel differently we would just need documentation from provider to update our program records to override any requirements. He seemed satisfied with this. · Pt expressed frustration with monthly Janumet fills. Perhaps new silvia will help with reminders/refills  · [facility]HP silvia: To sign up for an online [employer] [facility] [employer] account, you can visit this website: [email or web address] or search for \"[facility]\" in your mobile phone's silvia store. You will need one of your prescription numbers, mobile phone number associated with your home delivery, and email address to get started. If you have any questions/issues with making an account, you can email [email or web address] or call [phone] for assistance   - Follow up: PCP for identified gaps or as scheduled below  - Upcoming appointments:   Future Appointments   Date Time Provider Adrien Johnson   1/12/2021  8:00 AM SCHEDULE, [facility] None   2/1/2021  8:40 AM Tiera Franco MD [address] [place] [address]       Chelsea Fernandez, PharmD, [facility]  Department, toll free: [phone], option 7

## 2021-01-12 NOTE — TELEPHONE ENCOUNTER
Thanks for the clarification, I left message for pt to restart Jardiance and discontinue Amaryl unless his fasting sugar continues to be high > 130.

## 2021-02-01 ENCOUNTER — OFFICE VISIT (OUTPATIENT)
Dept: INTERNAL MEDICINE CLINIC | Age: 46
End: 2021-02-01
Payer: COMMERCIAL

## 2021-02-01 VITALS
DIASTOLIC BLOOD PRESSURE: 84 MMHG | HEART RATE: 78 BPM | OXYGEN SATURATION: 98 % | BODY MASS INDEX: 45.1 KG/M2 | HEIGHT: 70 IN | TEMPERATURE: 97.1 F | SYSTOLIC BLOOD PRESSURE: 148 MMHG | WEIGHT: 315 LBS

## 2021-02-01 DIAGNOSIS — E78.5 HYPERLIPIDEMIA WITH TARGET LDL LESS THAN 100: ICD-10-CM

## 2021-02-01 DIAGNOSIS — Z00.00 ANNUAL PHYSICAL EXAM: Primary | ICD-10-CM

## 2021-02-01 DIAGNOSIS — J45.909 ASTHMA DUE TO SEASONAL ALLERGIES: ICD-10-CM

## 2021-02-01 DIAGNOSIS — G47.33 OSA (OBSTRUCTIVE SLEEP APNEA): ICD-10-CM

## 2021-02-01 DIAGNOSIS — F41.9 ANXIETY AND DEPRESSION: ICD-10-CM

## 2021-02-01 DIAGNOSIS — I10 ESSENTIAL HYPERTENSION: ICD-10-CM

## 2021-02-01 DIAGNOSIS — F32.A ANXIETY AND DEPRESSION: ICD-10-CM

## 2021-02-01 DIAGNOSIS — E11.9 TYPE 2 DIABETES MELLITUS WITHOUT COMPLICATION, WITHOUT LONG-TERM CURRENT USE OF INSULIN (HCC): ICD-10-CM

## 2021-02-01 DIAGNOSIS — E66.01 MORBID OBESITY WITH BMI OF 40.0-44.9, ADULT (HCC): ICD-10-CM

## 2021-02-01 LAB
A/G RATIO: 1.6 (ref 1.1–2.2)
ALBUMIN SERPL-MCNC: 4.6 G/DL (ref 3.4–5)
ALP BLD-CCNC: 72 U/L (ref 40–129)
ALT SERPL-CCNC: 29 U/L (ref 10–40)
ANION GAP SERPL CALCULATED.3IONS-SCNC: 13 MMOL/L (ref 3–16)
AST SERPL-CCNC: 19 U/L (ref 15–37)
BASOPHILS ABSOLUTE: 0.1 K/UL (ref 0–0.2)
BASOPHILS RELATIVE PERCENT: 0.9 %
BILIRUB SERPL-MCNC: 0.5 MG/DL (ref 0–1)
BUN BLDV-MCNC: 10 MG/DL (ref 7–20)
CALCIUM SERPL-MCNC: 9.6 MG/DL (ref 8.3–10.6)
CHLORIDE BLD-SCNC: 100 MMOL/L (ref 99–110)
CHOLESTEROL, TOTAL: 205 MG/DL (ref 0–199)
CO2: 28 MMOL/L (ref 21–32)
CREAT SERPL-MCNC: 1 MG/DL (ref 0.9–1.3)
CREATININE URINE: 195.8 MG/DL (ref 39–259)
EOSINOPHILS ABSOLUTE: 0.1 K/UL (ref 0–0.6)
EOSINOPHILS RELATIVE PERCENT: 2 %
GFR AFRICAN AMERICAN: >60
GFR NON-AFRICAN AMERICAN: >60
GLOBULIN: 2.8 G/DL
GLUCOSE BLD-MCNC: 93 MG/DL (ref 70–99)
HBA1C MFR BLD: 7.3 %
HCT VFR BLD CALC: 40.3 % (ref 40.5–52.5)
HDLC SERPL-MCNC: 52 MG/DL (ref 40–60)
HEMOGLOBIN: 13.1 G/DL (ref 13.5–17.5)
LDL CHOLESTEROL CALCULATED: 123 MG/DL
LYMPHOCYTES ABSOLUTE: 1.4 K/UL (ref 1–5.1)
LYMPHOCYTES RELATIVE PERCENT: 24.9 %
MCH RBC QN AUTO: 29.2 PG (ref 26–34)
MCHC RBC AUTO-ENTMCNC: 32.6 G/DL (ref 31–36)
MCV RBC AUTO: 89.4 FL (ref 80–100)
MICROALBUMIN UR-MCNC: 8.6 MG/DL
MICROALBUMIN/CREAT UR-RTO: 43.9 MG/G (ref 0–30)
MONOCYTES ABSOLUTE: 0.5 K/UL (ref 0–1.3)
MONOCYTES RELATIVE PERCENT: 8.4 %
NEUTROPHILS ABSOLUTE: 3.5 K/UL (ref 1.7–7.7)
NEUTROPHILS RELATIVE PERCENT: 63.8 %
PDW BLD-RTO: 14.1 % (ref 12.4–15.4)
PLATELET # BLD: 328 K/UL (ref 135–450)
PMV BLD AUTO: 7.7 FL (ref 5–10.5)
POTASSIUM SERPL-SCNC: 4.1 MMOL/L (ref 3.5–5.1)
RBC # BLD: 4.5 M/UL (ref 4.2–5.9)
SODIUM BLD-SCNC: 141 MMOL/L (ref 136–145)
TOTAL PROTEIN: 7.4 G/DL (ref 6.4–8.2)
TRIGL SERPL-MCNC: 151 MG/DL (ref 0–150)
VLDLC SERPL CALC-MCNC: 30 MG/DL
WBC # BLD: 5.4 K/UL (ref 4–11)

## 2021-02-01 PROCEDURE — 3051F HG A1C>EQUAL 7.0%<8.0%: CPT | Performed by: INTERNAL MEDICINE

## 2021-02-01 PROCEDURE — 83036 HEMOGLOBIN GLYCOSYLATED A1C: CPT | Performed by: INTERNAL MEDICINE

## 2021-02-01 PROCEDURE — 99396 PREV VISIT EST AGE 40-64: CPT | Performed by: INTERNAL MEDICINE

## 2021-02-01 PROCEDURE — 99213 OFFICE O/P EST LOW 20 MIN: CPT | Performed by: INTERNAL MEDICINE

## 2021-02-01 PROCEDURE — 36415 COLL VENOUS BLD VENIPUNCTURE: CPT | Performed by: INTERNAL MEDICINE

## 2021-02-01 RX ORDER — BUPROPION HYDROCHLORIDE 300 MG/1
300 TABLET ORAL EVERY MORNING
Qty: 90 TABLET | Refills: 3 | Status: SHIPPED | OUTPATIENT
Start: 2021-02-01 | End: 2022-01-31 | Stop reason: SDUPTHER

## 2021-02-01 RX ORDER — MONTELUKAST SODIUM 10 MG/1
TABLET ORAL
Qty: 90 TABLET | Refills: 3 | Status: SHIPPED | OUTPATIENT
Start: 2021-02-01 | End: 2022-01-31 | Stop reason: SDUPTHER

## 2021-02-01 RX ORDER — LISINOPRIL 20 MG/1
TABLET ORAL
Qty: 90 TABLET | Refills: 3 | Status: SHIPPED | OUTPATIENT
Start: 2021-02-01 | End: 2022-01-31 | Stop reason: SDUPTHER

## 2021-02-01 RX ORDER — SIMVASTATIN 20 MG
TABLET ORAL
Qty: 90 TABLET | Refills: 3 | Status: SHIPPED
Start: 2021-02-01 | End: 2021-02-02 | Stop reason: ALTCHOICE

## 2021-02-01 NOTE — PROGRESS NOTES
[facility]  History and Physical  Srinivas Myles M.D. Jesse Shipley  YOB: 1975    Date of Service:  2/1/2021    Chief Complaint:   Jesse Shipley is a 50 y.o. male who presents for   Chief Complaint   Patient presents with    Annual Exam       HPI: Here for Annual Physical and Follow up. Treatment Adherence:   Medication compliance: compliant all of the time   Diet compliance: compliant most of the time   Weight trend: decreasing   Current exercise: walks 3 time(s) per week   Barriers: time constraints   Diabetes Mellitus Type 2: Current symptoms/problems include: none. Jardiance 25 mg before breakfast but will switch to before dinner Janumet XR 50/1000 mg 2 breakfast and Amaryl 1 mg bid.  He has lost weight since getting off Actos. Home blood sugar records: fasting range: 160-170 before breakfast and 100-120 before dinnerAny episodes of hypoglycemia? no   Eye exam current (within one year): yes   Tobacco history: He reports that he quit smoking about 7 years ago. He has never used smokeless tobacco.   Daily Aspirin? No: will start   Known diabetic complications: none   Hypertension: Home blood pressure monitoring: no on Lisinopril 20 mg qd. He is adherent to a low sodium diet. Patient denies chest pain, shortness of breath, headache, lightheadedness, blurred vision, peripheral edema, palpitations, dry cough and fatigue. Antihypertensive medication side effects: no medication side effects noted. Use of agents associated with hypertension: none. Hyperlipidemia: No new myalgias or GI upset on simvastatin (Zocor) 20 mg qhs. Adjustment d/o with depression:  much improve and rarely feel depress on Wellbutrin  mg qam and Lexapro 20 mg qd.  He's off Cymbalta 60 mg bid due to edema.  No SI or HI.  He has been seeing a counselor every 6-8 weeks.   Insomnia: stable off trazodone 150 mg prn couple of weeks  Exercise/allergies induce Asthma:  Stable on Singulair 10 mg qhs and have not need Flonase. 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 No pain or hearing loss. Obesity: He's had sleeve surgery 11/10/14. Anemia: not been on any iron pill but is taking a multivitamin due to constipation. Vit D3: stable on Vit D3 2000 U qd.    Lab Results   Component Value Date    LABA1C 7.3 02/01/2021    LABA1C 7.1 12/02/2020    LABA1C 7.2 08/27/2020    LABMICR <1.20 02/20/2020     Lab Results   Component Value Date     02/21/2020    K 4.0 02/21/2020    CL 99 02/21/2020    CO2 25 02/21/2020    BUN 13 02/21/2020    CREATININE 0.7 (L) 02/21/2020    GLUCOSE 86 02/21/2020    CALCIUM 9.7 02/21/2020     Lab Results   Component Value Date    CHOL 134 02/21/2020    TRIG 62 02/21/2020    HDL 58 02/21/2020    HDL 43 05/23/2012    LDLCALC 64 02/21/2020    LDLDIRECT 76 03/13/2013     Lab Results   Component Value Date    ALT 15 02/21/2020    AST 15 02/21/2020     Lab Results   Component Value Date    TSH 2.02 08/24/2016    TSH 0.93 06/12/2015     Lab Results   Component Value Date    WBC 4.1 02/11/2019    HGB 12.7 (L) 02/11/2019    HCT 36.9 (L) 02/11/2019    MCV 86.6 02/11/2019     02/11/2019     Lab Results   Component Value Date    INR 0.84 (L) 03/13/2013      No results found for: PSA   No results found for: LABURIC     Wt Readings from Last 3 Encounters:   02/01/21 (!) 327 lb (148.3 kg)   12/02/20 (!) 331 lb (150.1 kg)   10/27/20 300 lb (136.1 kg)     BP Readings from Last 3 Encounters:   02/01/21 (!) 148/84   12/02/20 130/74   10/27/20 122/66       Patient Active Problem List   Diagnosis    Hyperlipidemia with target LDL less than 100    Essential hypertension    Type 2 diabetes mellitus without complication, without long-term current use of insulin (HCC)    MICHAEL (obstructive sleep apnea)    S/P laparoscopic sleeve gastrectomy    Morbid obesity with BMI of 40.0-44.9, adult ([facility] [address]  Asthma due to seasonal allergies    Erectile dysfunction associated with type 2 diabetes mellitus (HCC)    Perforation of left tympanic membrane    Primary insomnia    Anxiety and depression       Allergies   Allergen Reactions    Actos [Pioglitazone] Swelling     Diffuse weight gain     Outpatient Medications Marked as Taking for the 2/1/21 encounter (Office Visit) with Ehsan Whittington MD   Medication Sig Dispense Refill    GLUCOSAMINE HCL PO Take 2 tablets by mouth daily      buPROPion (WELLBUTRIN XL) 300 MG extended release tablet Take 1 tablet by mouth every morning 90 tablet 0    SITagliptin-metFORMIN (JANUMET XR)  MG TB24 per extended release tablet TAKE TWO TABLETS BY MOUTH EVERY  tablet 3    traZODone (DESYREL) 100 MG tablet Take 1.5 tablets by mouth nightly 45 tablet 0    blood glucose test strips (AGAMATRIX JAZZ TEST) strip 1 each by In Vitro route daily Use to test blood sugar daily. Please dispense 1 box of 100. 100 each 3    Blood Glucose Monitoring Suppl (AGAMATRIX JAZZ WIRELESS 2) w/Device KIT 1 kit by Does not apply route See Admin Instructions 1 kit 0    Blood Glucose Calibration (AGAMATRIX CONTROL) Normal SOLN 1 Bottle by In Vitro route once for 1 dose 1 each 0    AgaMatrix Ultra-Thin Lancets MISC 1 box by Does not apply route daily Use to test blood sugar once daily.  100 each 3    empagliflozin (JARDIANCE) 25 MG tablet Take 25 mg by mouth daily 90 tablet 3    simvastatin (ZOCOR) 20 MG tablet TAKE ONE TABLET BY MOUTH NIGHTLY 90 tablet 3    lisinopril (PRINIVIL;ZESTRIL) 20 MG tablet TAKE 1 TABLET BY MOUTH DAILY 90 tablet 3    escitalopram (LEXAPRO) 20 MG tablet TAKE 1 TABLET BY MOUTH ONE TIME A DAY 90 tablet 3    montelukast (SINGULAIR) 10 MG tablet TAKE 1 TABLET BY MOUTH ONE TIME A DAY 90 tablet 3    aspirin 325 MG EC tablet Take 1 tablet by mouth daily 30 tablet 0    Multiple Vitamins-Minerals (THERAPEUTIC MULTIVITAMIN-MINERALS) tablet Take 1 tablet by mouth daily      sildenafil (REVATIO) 20 MG tablet Take 1 tablet by mouth daily as needed (ED) 50 tablet 3    Cholecalciferol (VITAMIN D3) 2000 UNITS CAPS Take 2,000 Units by mouth daily       Blood Pressure Monitoring (B-D ASSURE BPM/AUTO ARM CUFF) MISC 1 Device by Does not apply route daily.  1 each 0       Past Medical History:   Diagnosis Date    Anxiety and depression     Asthma due to seasonal allergies 10/6/2015    Bruised rib 08/2019    Dental bridge present     Dysthymia 10/8/2019    Gout 2010    Hyperlipidemia     Hypertension     Morbid obesity with BMI of 40.0-44.9, adult ([facility] [address]     Obesity (BMI 30-39.9) 2/12/2015    Type II or unspecified type diabetes mellitus without mention of complication, not stated as uncontrolled     Unspecified sleep apnea     uses cpap     Past Surgical History:   Procedure Laterality Date    CYST REMOVAL      DIAGNOSTIC CARDIAC CATH LAB PROCEDURE      KNEE ARTHROSCOPY Right 11/25/2019    RIGHT KNEE ARTHROSCOPY, PARTIAL MEDIAL MENISCECTOMY performed by José Han MD at [address] [place] [address]  11/10/2014    LAPAROSCOPIC    TYMPANOPLASTY N/A 8/26/2019    LEFT MYRINGOPLASTY WITH PAPER PATCH performed by Patricia Ott DO at [facility] OR     Family History   Problem Relation Age of Onset    High Blood Pressure Mother     High Cholesterol Mother     Diabetes Mother     Arthritis Mother     Heart Disease Father     High Blood Pressure Father     High Cholesterol Father     High Blood Pressure Sister     High Cholesterol Sister     Alcohol Abuse Paternal Uncle     COPD Paternal Uncle     Alcohol Abuse Maternal Cousin     Alcohol Abuse Paternal Cousin     Cancer Maternal Grandmother 45        Uterine    Cancer Paternal Grandfather         Throat    Alcohol Abuse Paternal Uncle     COPD Paternal Uncle     Alcohol Abuse Paternal Uncle      Social History     Socioeconomic History    Marital status:      Spouse name: Not on file    Number of children: Not on file    Years of education: Not on file    Highest education level: Not on file   Occupational History    Occupation: behavorial health     Employer: [employer] resource strain: Not on file    Food insecurity     Worry: Not on file     Inability: Not on file    Transportation needs     Medical: Not on file     Non-medical: Not on file   Tobacco Use    Smoking status: Former Smoker     Packs/day: 1.50     Years: 20.00     Pack years: 30.00     Quit date: 2007     Years since quittin.0    Smokeless tobacco: Never Used   Substance and Sexual Activity    Alcohol use: Yes     Comment: socially     Drug use: No    Sexual activity: Yes     Partners: Female   Lifestyle    Physical activity     Days per week: Not on file     Minutes per session: Not on file    Stress: Not on file   Relationships    Social connections     Talks on phone: Not on file     Gets together: Not on file     Attends Jainism service: Not on file     Active member of club or organization: Not on file     Attends meetings of clubs or organizations: Not on file     Relationship status: Not on file    Intimate partner violence     Fear of current or ex partner: Not on file     Emotionally abused: Not on file     Physically abused: Not on file     Forced sexual activity: Not on file   Other Topics Concern    Not on file   Social History Narrative    Not on file       Review of Systems:  A comprehensive review of systems was negative except for what was noted in the HPI. Physical Exam:   Vitals:    21 0815   BP: (!) 148/84   Pulse: 78   Temp: 97.1 °F (36.2 °C)   TempSrc: Infrared   SpO2: 98%   Weight: (!) 327 lb (148.3 kg)   Height: 5' 10\" (1.778 m)     Body mass index is 46.92 kg/m². Constitutional: He is oriented to person, place, and time. He appears well-developed and well-nourished. No distress. HEENT:   Head: Normocephalic and atraumatic. Right Ear: Tympanic membrane, external ear and ear canal normal.   Left Ear: Tympanic membrane, external ear and ear canal normal.   Mouth/Throat: Oropharynx is clear and moist and mucous membranes are normal. No oropharyngeal exudate or posterior oropharyngeal erythema. He has no cervical adenopathy. Eyes: Conjunctivae and extraocular motions are normal. Pupils are equal, round, and reactive to light. Neck:  Supple. No JVD present. Carotid bruit is not present. No mass and no thyromegaly present. Cardiovascular: Normal rate, regular rhythm, normal heart sounds and intact distal pulses. Exam reveals no gallop and no friction rub. No murmur heard. Pulmonary/Chest: Effort normal and breath sounds normal. No respiratory distress. He has no wheezes, rhonchi or rales. Abdominal: Soft, non-tender. Bowel sounds and aorta are normal. There is no organomegaly, mass or bruit. Musculoskeletal: Normal range of motion, no synovitis. He exhibits no edema. Neurological: He is alert and oriented to person, place, and time. He has normal reflexes. No cranial nerve deficit. Coordination normal.   Skin: Skin is warm, dry and intact. No suspicious lesions are noted. Psychiatric: He has a normal mood and affect. His speech is normal and behavior is normal. Judgment, cognition and memory are normal.   Diabetic Foot exam: Normal pedal pulses and bilateral sensation with 10 gm filament pin prick testing. No lesions, sores/ulcerations, toe nail fungus or deformities.     Preventive Care:  Health Maintenance   Topic Date Due    Hepatitis C screen  1975    Diabetic foot exam  02/20/2021    Diabetic microalbuminuria test  02/20/2021    Lipid screen  02/21/2021    Potassium monitoring  02/21/2021    Creatinine monitoring  02/21/2021    A1C test (Diabetic or Prediabetic)  12/02/2021    Diabetic retinal exam  09/15/2022    DTaP/Tdap/Td vaccine (3 - Td) 10/27/2030    Hepatitis B vaccine  Completed    Flu vaccine Completed    Pneumococcal 0-64 years Vaccine  Completed    HIV screen  Addressed    Hepatitis A vaccine  Aged Out    Hib vaccine  Aged Out    Meningococcal (ACWY) vaccine  Aged Out        Sexual activity: none   Last eye exam: 8/2020, normal  Exercise: no regular exercise         Preventive plan of care for Pricilla Douglass        2/1/2021           Preventive Measures Status       Recommendations for screening           Diabetes Screen  Glucose (mg/dL)   Date Value   02/21/2020 86    Test recommended and ordered   Cholesterol Screen  Lab Results   Component Value Date    CHOL 134 02/21/2020    TRIG 62 02/21/2020    HDL 58 02/21/2020    LDLCALC 64 02/21/2020    LDLDIRECT 76 03/13/2013    Test recommended and ordered       Weight: Body mass index is 46.92 kg/m².   5' 10\" (1.778 m)(!) 327 lb (148.3 kg)  Your BMI is 25 or greater, which indicates that you are overweight        Recommended Immunizations    Immunization History   Administered Date(s) Administered    Hepatitis B Adult (Recombivax HB) 06/01/2001, 09/07/2001, 02/14/2002    Influenza A ([id number]) Vaccine PF IM 10/26/2009    Influenza Vaccine, unspecified formulation 10/02/2017    Influenza Virus Vaccine 10/20/2014, 10/02/2017, 10/02/2017, 10/03/2018, 10/11/2019, 10/01/2020    Influenza Whole 10/20/2014    MMR 06/01/2001    Pneumococcal Conjugate 13-valent ([id number]) 07/29/2014    Pneumococcal Polysaccharide ([id number]) 11/03/2015    Tdap (Boostrix, Adacel) 06/27/2017, 10/27/2020    Influenza vaccine:  recommended every fall         Other Recommendations ·   See a dentist every 6 months  · Try to get at least 30 minutes of exercise 3-5 days per week  · Always wear a seat belt when traveling in a car  · Always wear a helmet when riding a bicycle or motorcycle  · When exposed to the sun, use a sunscreen that protects against both UVA and UVB radiation with an SPF of 30 or greater- reapply every 2 to 3 hours or after sweating, drying off with a towel, or swimming             Assessment/Plan:  Catalino Johnson was seen today for annual exam.    Diagnoses and all orders for this visit:    Annual physical exam  -     POCT glycosylated hemoglobin (Hb A1C)  -     Lipid Panel  -     Comprehensive Metabolic Panel  -     CBC Auto Differential    Type 2 diabetes mellitus without complication, without long-term current use of insulin (HCC)  -     POCT glycosylated hemoglobin (Hb A1C)  -     Microalbumin / Creatinine Urine Ratio  -     Diabetic Foot Exam  Increase amaryl 1 mg 1 breakfast 1 1/2 - 2 before dinner    Anxiety and depression  -     buPROPion (WELLBUTRIN XL) 300 MG extended release tablet; Take 1 tablet by mouth every morning  Decrease Lexapro 20 mg 1/2 qd    Hyperlipidemia with target LDL less than 100  -     simvastatin (ZOCOR) 20 MG tablet; TAKE ONE TABLET BY MOUTH NIGHTLY  -     Lipid Panel    Essential hypertension  -     lisinopril (PRINIVIL;ZESTRIL) 20 MG tablet; TAKE 1 TABLET BY MOUTH DAILY  -     Comprehensive Metabolic Panel    Asthma due to seasonal allergies  -     montelukast (SINGULAIR) 10 MG tablet; TAKE 1 TABLET BY MOUTH ONE TIME A DAY    MICHAEL (obstructive sleep apnea)    Morbid obesity with BMI of 40.0-44.9, adult [facility])        Return March 16 at 8:20 VV Diabetes.

## 2021-02-02 DIAGNOSIS — E78.5 HYPERLIPIDEMIA WITH TARGET LDL LESS THAN 100: Primary | ICD-10-CM

## 2021-02-02 RX ORDER — ROSUVASTATIN CALCIUM 20 MG/1
20 TABLET, COATED ORAL NIGHTLY
Qty: 90 TABLET | Refills: 3 | Status: SHIPPED | OUTPATIENT
Start: 2021-02-02 | End: 2022-01-31 | Stop reason: SDUPTHER

## 2021-02-17 ENCOUNTER — VIRTUAL VISIT (OUTPATIENT)
Dept: PULMONOLOGY | Age: 46
End: 2021-02-17
Payer: COMMERCIAL

## 2021-02-17 ENCOUNTER — TELEPHONE (OUTPATIENT)
Dept: PULMONOLOGY | Age: 46
End: 2021-02-17

## 2021-02-17 DIAGNOSIS — Z99.89 OSA ON CPAP: Primary | ICD-10-CM

## 2021-02-17 DIAGNOSIS — G47.33 OSA ON CPAP: Primary | ICD-10-CM

## 2021-02-17 PROCEDURE — 99442 PR PHYS/QHP TELEPHONE EVALUATION 11-20 MIN: CPT | Performed by: INTERNAL MEDICINE

## 2021-02-17 ASSESSMENT — SLEEP AND FATIGUE QUESTIONNAIRES
HOW LIKELY ARE YOU TO NOD OFF OR FALL ASLEEP WHILE WATCHING TV: 0
HOW LIKELY ARE YOU TO NOD OFF OR FALL ASLEEP WHILE SITTING QUIETLY AFTER LUNCH WITHOUT ALCOHOL: 0
HOW LIKELY ARE YOU TO NOD OFF OR FALL ASLEEP WHEN YOU ARE A PASSENGER IN A CAR FOR AN HOUR WITHOUT A BREAK: 0
HOW LIKELY ARE YOU TO NOD OFF OR FALL ASLEEP WHILE SITTING AND READING: 0

## 2021-02-17 NOTE — TELEPHONE ENCOUNTER
Within this Telehealth Consent, the terms you and yours refer to the person using the Telehealth Service (Service), or in the case of a use of the Service by or on behalf of a minor, you and yours refer to and include (i) the parent or legal guardian who provides consent to the use of the Service by such minor or uses the Service on behalf of such minor, and (ii) the minor for whom consent is being provided or on whose behalf the Service is being utilized. When using Service, you will be consulting with your health care providers via the use of Telehealth.   Telehealth involves the delivery of healthcare services using electronic communications, information technology or other means between a healthcare provider and a patient who are not in the same physical location. Telehealth may be used for diagnosis, treatment, follow-up and/or patient education, and may include, but is not limited to, one or more of the following:    Electronic transmission of medical records, photo images, personal health information or other data between a patient and a healthcare provider    Interactions between a patient and healthcare provider via audio, video and/or data communications    Use of output data from medical devices, sound and video files    Anticipated Benefits   The use of Telehealth by your Provider(s) through the Service may have the following possible benefits:    Making it easier and more efficient for you to access medical care and treatment for the conditions treated by such Provider(s) utilizing the Service    Allowing you to obtain medical care and treatment by Provider(s) at times that are convenient for you    Enabling you to interact with Provider(s) without the necessity of an in-office appointment     Possible Risks   While the use of Telehealth can provide potential benefits for you, there are also potential risks associated with the use of Telehealth.  These risks include, but may not be the provision of medical care and treatment via Telehealth and the Service and you may not be able to receive diagnosis and/or treatment through the Service for every condition for which you seek diagnosis and/or treatment. 11. There are potential risks to the use of Telehealth, including but not limited to the risks described in this Telehealth Consent. 12. Your Provider(s) have discussed the use of Telehealth and the Service with you, including the benefits and risks of such and you have provided oral consent to your Provider(s) for the use of Telehealth and the Service. 15. You understand that it is your duty to provide your Provider(s) truthful, accurate and complete information, including all relevant information regarding care that you may have received or may be receiving from other healthcare providers outside of the Service. 14. You understand that each of your Provider(s) may determine in his or sole discretion that your condition is not suitable for diagnosis and/or treatment using the Service, and that you may need to seek medical care and treatment a specialist or other healthcare provider, outside of the Service. 15. You understand that you are fully responsible for payment for all services provided by Provider(s) or through use of the Service and that you may not be able to use third-party insurance. 16. You represent that (a) you have read this Telehealth Consent carefully, (b) you understand the risks and benefits of the Service and the use of Telehealth in the medical care and treatment provided to you by Provider(s) using the Service, and (c) you have the legal capacity and authority to provide this consent for yourself and/or the minor for which you are consenting under applicable federal and state laws, including laws relating to the age of [de-identified] and/or parental/guardian consent.    17. You give your informed consent to the use of Telehealth by Provider(s) using the Service under the terms described in the Terms of Service and this Telehealth Consent. The patient was read the following statement and has consented to the visit as of 2/17/21. The patient has been scheduled for their first telehealth visit on 2/17/21 with .

## 2021-02-17 NOTE — PROGRESS NOTES
CC: MICHAEL  HPI   Interval History:   Wants new CPAP machine. Old machine not generating sufficient pressure, although it is still helpful and he cannot sleep without it. He travels frequently and would like a smaller device. Will buy with [facility]. HPI 2019: 40 yo with greater than 5-year history of MICHAEL, treated with CPAP 11 with improvement in sleep quality and sleepiness. Never sleeps without it no associated sleepiness. Last compliance report showed 30/30, average 7 hours, AHI is less than 1 but that was more than 2 years ago. No modifying or exacerbating factors. reports that he quit smoking about 14 years ago. He has a 30.00 pack-year smoking history. He has never used smokeless tobacco.    Review of Systems     Objective:   Physical Exam     There were no vitals taken for this visit. PHONE    CPAP titration Aug 2014 with AHI of zero on CPAP 11    PSG with AHI of 9    Assessment:      · Obstructive Sleep Apnea - mild - resolved while on CPAP 11 cm        Plan:      · APAP 11-15 cmH2O. Needs new machine, please fax to email rx. He will send to [employer] and get dreamstation w/o humidifier. Patient has been counseled to never drive while sleepy   Offer f/u in 2-3 months for new CPAP machine, needs to be in office so we can get download. Must bring machine.

## 2021-03-16 ENCOUNTER — TELEMEDICINE (OUTPATIENT)
Dept: INTERNAL MEDICINE CLINIC | Age: 46
End: 2021-03-16
Payer: COMMERCIAL

## 2021-03-16 DIAGNOSIS — E66.01 MORBID OBESITY WITH BMI OF 40.0-44.9, ADULT (HCC): ICD-10-CM

## 2021-03-16 DIAGNOSIS — F41.9 ANXIETY AND DEPRESSION: ICD-10-CM

## 2021-03-16 DIAGNOSIS — E11.9 TYPE 2 DIABETES MELLITUS WITHOUT COMPLICATION, WITHOUT LONG-TERM CURRENT USE OF INSULIN (HCC): Primary | ICD-10-CM

## 2021-03-16 DIAGNOSIS — F32.A ANXIETY AND DEPRESSION: ICD-10-CM

## 2021-03-16 PROCEDURE — 3051F HG A1C>EQUAL 7.0%<8.0%: CPT | Performed by: INTERNAL MEDICINE

## 2021-03-16 PROCEDURE — 99214 OFFICE O/P EST MOD 30 MIN: CPT | Performed by: INTERNAL MEDICINE

## 2021-03-16 RX ORDER — GLIMEPIRIDE 2 MG/1
2 TABLET ORAL 2 TIMES DAILY
Qty: 180 TABLET | Refills: 0 | Status: SHIPPED | OUTPATIENT
Start: 2021-03-16 | End: 2021-06-01 | Stop reason: SDUPTHER

## 2021-03-16 RX ORDER — ESCITALOPRAM OXALATE 20 MG/1
TABLET ORAL
Qty: 90 TABLET | Refills: 0 | Status: SHIPPED | OUTPATIENT
Start: 2021-03-16 | End: 2021-06-01 | Stop reason: SDUPTHER

## 2021-03-16 NOTE — PROGRESS NOTES
Date of Service:  3/16/2021    Chief Complaint:      Chief Complaint   Patient presents with    Diabetes       HPI:  Hilton Centeno is a 50 y.o. Pursuant to the emergency declaration under the [facility] [place] [address] [facility] authority and the [employer] Act, this Virtual  Video Visit was conducted, with patient's consent, to reduce the patient's risk of exposure to COVID-19 and provide continuity of care. Service is  provided through a video synchronous discussion virtually to substitute for in-person clinic visit with the patient being at home and Dr. Reese Pastor being at home. Treatment Adherence:   Medication compliance: compliant all of the time   Diet compliance: compliant most of the time   Weight trend: decreasing   Current exercise: walks 3 time(s) per week   Barriers: time constraints   Diabetes Mellitus Type 2: Current symptoms/problems include: none.  Jardiance 25 mg AM, Janumet XR 50/1000 mg 2 breakfast and Amaryl 1 mg 1 AM and 2 PM.  He has lost weight since getting off Actos. Home blood sugar records: fasting range: 139 151 118 188 120 154 182 before breakfast and 95 118 125 88 88 103 before dinner  Any episodes of hypoglycemia? no   Eye exam current (within one year): yes   Tobacco history: He reports that he quit smoking about 7 years ago. He has never used smokeless tobacco.   Daily Aspirin? No: will start   Known diabetic complications: none   Hypertension: Home blood pressure monitoring: no on Lisinopril 20 mg qd. He is adherent to a low sodium diet. Patient denies chest pain, shortness of breath, headache, lightheadedness, blurred vision, peripheral edema, palpitations, dry cough and fatigue. Antihypertensive medication side effects: no medication side effects noted. Use of agents associated with hypertension: none. Hyperlipidemia: No new myalgias or GI upset on simvastatin (Zocor) 20 mg qhs.    Adjustment d/o with depression:  much improve and rarely feel depress on Wellbutrin  mg qam and Lexapro 20 mg qd until he forgot to take the Lexapro for the past month.  He's off Cymbalta 60 mg bid due to edema.  No SI or HI.  He has been seeing a counselor every 6-8 weeks. Insomnia: stable off trazodone 150 mg prn couple of weeks  Exercise/allergies induce Asthma:  Stable on Singulair 10 mg qhs and have not need Flonase. 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 No pain or hearing loss. Obesity: He's had sleeve surgery 11/10/14. Anemia: not been on any iron pill but is taking a multivitamin due to constipation. Vit D3: stable on Vit D3 2000 U qd.      Lab Results   Component Value Date    LABA1C 7.3 02/01/2021    LABA1C 7.1 12/02/2020    LABA1C 7.2 08/27/2020    LABMICR 8.60 (H) 02/01/2021     Lab Results   Component Value Date     02/01/2021    K 4.1 02/01/2021     02/01/2021    CO2 28 02/01/2021    BUN 10 02/01/2021    CREATININE 1.0 02/01/2021    GLUCOSE 93 02/01/2021    CALCIUM 9.6 02/01/2021     Lab Results   Component Value Date    CHOL 205 02/01/2021    TRIG 151 02/01/2021    HDL 52 02/01/2021    HDL 43 05/23/2012    LDLCALC 123 02/01/2021    LDLDIRECT 76 03/13/2013     Lab Results   Component Value Date    ALT 29 02/01/2021    AST 19 02/01/2021     Lab Results   Component Value Date    TSH 2.02 08/24/2016    TSH 0.93 06/12/2015     Lab Results   Component Value Date    WBC 5.4 02/01/2021    HGB 13.1 (L) 02/01/2021    HCT 40.3 (L) 02/01/2021    MCV 89.4 02/01/2021     02/01/2021     Lab Results   Component Value Date    INR 0.84 (L) 03/13/2013      No results found for: PSA   No results found for: LABURIC     Patient Active Problem List   Diagnosis    Hyperlipidemia with target LDL less than 100    Essential hypertension    Type 2 diabetes mellitus without complication, without long-term current use of insulin (HCC)    MICHAEL (obstructive sleep apnea)    S/P laparoscopic sleeve gastrectomy    Morbid obesity with BMI of 40.0-44.9, adult ([facility] [address]    Asthma due to seasonal allergies    Erectile dysfunction associated with type 2 diabetes mellitus (HCC)    Perforation of left tympanic membrane    Primary insomnia    Anxiety and depression       Allergies   Allergen Reactions    Actos [Pioglitazone] Swelling     Diffuse weight gain     Outpatient Medications Marked as Taking for the 3/16/21 encounter (Telemedicine) with Ton Franco MD   Medication Sig Dispense Refill    rosuvastatin (CRESTOR) 20 MG tablet Take 1 tablet by mouth nightly 90 tablet 3    buPROPion (WELLBUTRIN XL) 300 MG extended release tablet Take 1 tablet by mouth every morning 90 tablet 3    lisinopril (PRINIVIL;ZESTRIL) 20 MG tablet TAKE 1 TABLET BY MOUTH DAILY 90 tablet 3    montelukast (SINGULAIR) 10 MG tablet TAKE 1 TABLET BY MOUTH ONE TIME A DAY 90 tablet 3    GLUCOSAMINE HCL PO Take 2 tablets by mouth daily      SITagliptin-metFORMIN (JANUMET XR)  MG TB24 per extended release tablet TAKE TWO TABLETS BY MOUTH EVERY  tablet 3    blood glucose test strips (AGAMATRIX JAZZ TEST) strip 1 each by In Vitro route daily Use to test blood sugar daily. Please dispense 1 box of 100. 100 each 3    Blood Glucose Monitoring Suppl (AGAMATRIX JAZZ WIRELESS 2) w/Device KIT 1 kit by Does not apply route See Admin Instructions 1 kit 0    Blood Glucose Calibration (AGAMATRIX CONTROL) Normal SOLN 1 Bottle by In Vitro route once for 1 dose 1 each 0    AgaMatrix Ultra-Thin Lancets MISC 1 box by Does not apply route daily Use to test blood sugar once daily.  100 each 3    empagliflozin (JARDIANCE) 25 MG tablet Take 25 mg by mouth daily 90 tablet 3    escitalopram (LEXAPRO) 20 MG tablet TAKE 1 TABLET BY MOUTH ONE TIME A DAY 90 tablet 3    aspirin 325 MG EC tablet Take 1 tablet by mouth daily 30 tablet 0    Multiple Vitamins-Minerals (THERAPEUTIC MULTIVITAMIN-MINERALS) tablet Take 1 tablet by mouth daily      sildenafil (REVATIO) 20 MG tablet Take 1 tablet by mouth daily as needed (ED) 50 tablet 3    Cholecalciferol (VITAMIN D3) 2000 UNITS CAPS Take 2,000 Units by mouth daily       Blood Pressure Monitoring (B-D ASSURE BPM/AUTO ARM CUFF) MISC 1 Device by Does not apply route daily. 1 each 0         Review of Systems: 14 systems were negative except of what was stated on HPI    Nursing note and vitals reviewed. There were no vitals filed for this visit. Wt Readings from Last 3 Encounters:   02/01/21 (!) 327 lb (148.3 kg)   12/02/20 (!) 331 lb (150.1 kg)   10/27/20 300 lb (136.1 kg)     BP Readings from Last 3 Encounters:   02/01/21 (!) 148/84   12/02/20 130/74   10/27/20 122/66     There is no height or weight on file to calculate BMI. Constitutional: Patient appears well-developed and well-nourished. No distress. Head: Normocephalic and atraumatic. Skin: No rash or erythema. Psychiatric: Normal mood and affect. Behavior is normal.       Assessment/Plan:  Ewa Salgado was seen today for diabetes. Diagnoses and all orders for this visit:    Type 2 diabetes mellitus without complication, without long-term current use of insulin ([facility])  Increase glimepiride (AMARYL) 2 MG tablet; Take 1 tablet by mouth 2 times daily  -     HEMOGLOBIN A1C; Future  Start taking Jardiance 25 mg qhs    Anxiety and depression  Restart escitalopram (LEXAPRO) 20 MG tablet; TAKE 1 TABLET BY MOUTH ONE TIME A DAY  Once feel better, can try to decrease 1/2 qd    Morbid obesity with BMI of 40.0-44.9, adult ([facility])  Goals:   -Eat 4-5 times daily  -Avoid high fat and high sugar foods  -Include protein with all meals and snacks  -Avoid carbonation and caffeine  -Avoid calorie containing beverages  -Increase physical activity as tolerated    3/29/21 Jardiance and Uma Edu d/kelsy and start Trijardy bid    Return April 27 at 8:40 VV Diabetes.

## 2021-03-24 NOTE — TELEPHONE ENCOUNTER
CLINICAL PHARMACY CONSULT: MEDICATION CONVERSION INITIATIVE    Kyra Griffith is a 50 y.o. male currently identified with current prescription for Metformin, DPP4i, and an SGLT2i    Converting patient to Trijardy XR (Metformin XR+Tradjenta+Jardiance) will help with [facility] cost savings, and also promote better adherence. Trijardy XR was recently added to formulary and will not require a PA. It is available to the patient for as little as $10 for a 90ds after savings card is applied. Lab Results   Component Value Date    LABA1C 7.3 02/01/2021    LABA1C 7.1 12/02/2020    LABA1C 7.2 08/27/2020         PLAN:  - Medications:   Suggest discontinue current Metformin, DPP4i, and SGLT2i & change to Trijardy XR     - Labs/follow up:   Per provider discretion     Thank you,  Jeannie Krause, PharmD Candidate of [place] [address]  Department, toll free: [phone], option 7    ======================================================  LM for patient to return call to review:  - patient is currently taking Janumet XR  mg- 2 tabs daily and Jardiance 25 mg daily  - patient is a candidate to convert over to Trijardy XR. - please ask if patient is willing to convert.

## 2021-03-26 NOTE — TELEPHONE ENCOUNTER
2nd attempt- Called patient earlier, patient asked to call back in 30 mins. Attempted to call patient after 30 mins, patient did not pickup- LVM asking for call back.        Nyla White, PharmD Candidate of [address] [place] [address], toll free: [phone], option 7

## 2021-03-29 RX ORDER — EMPAGLIFLOZIN, LINAGLIPTIN, METFORMIN HYDROCHLORIDE 12.5; 2.5; 1 MG/1; MG/1; MG/1
1 TABLET, EXTENDED RELEASE ORAL 2 TIMES DAILY
Qty: 90 TABLET | Refills: 3 | Status: SHIPPED | OUTPATIENT
Start: 2021-03-29 | End: 2021-03-29 | Stop reason: SDUPTHER

## 2021-03-29 RX ORDER — EMPAGLIFLOZIN, LINAGLIPTIN, METFORMIN HYDROCHLORIDE 12.5; 2.5; 1 MG/1; MG/1; MG/1
1 TABLET, EXTENDED RELEASE ORAL 2 TIMES DAILY
Qty: 180 TABLET | Refills: 3 | Status: SHIPPED | OUTPATIENT
Start: 2021-03-29 | End: 2022-01-31 | Stop reason: SDUPTHER

## 2021-03-31 NOTE — TELEPHONE ENCOUNTER
Thank you for the quick response. Noted that PCP signed off on Trijardy prescription. Called patient to make him aware of this. Verbalized understanding. He will be finishing out his current prescription and then starting Trijardy XR. Prescription is in process and being filled by [facility]. Should be delivered within a week or so.     Shonna Padilla, PharmD, [address] [facility]  Direct: [phone]  Department, toll free: [phone], option 7      CLINICAL PHARMACY CONSULT: [facility] Only    PHSO: Yes  Total # of Interventions Recommended: 1  - New Order #: 1 New Medication Order Reason(s): Cost Conversion  Recommended intervention potential cost savings: 1  Total Interventions Accepted: 1  Time Spent (min): 1898 Khanh Montero, PharmD  [address]

## 2021-04-13 ENCOUNTER — TELEPHONE (OUTPATIENT)
Dept: BARIATRICS/WEIGHT MGMT | Age: 46
End: 2021-04-13

## 2021-04-13 NOTE — LETTER
Mercy Hager MD  [facility] ([facility]  [address] [facility], [address] [facility]  [phone]  Phone  [phone]  Fax    Li Smart,    We noticed that you missed your last appointment. We are interested in your progress and how you have been feeling! As you know, it is important to complete regular follow-up visits to monitor your health after surgery and to insure the best success of your procedure. Please call the office today at [phone] to schedule an appointment. We look forward to seeing you!       Mercy Hager MD  [facility] ([facility]) [facility]  [address] [facility], [address] [facility], [address] [facility]  [phone]  Phone  [phone]  Fax

## 2021-04-24 ENCOUNTER — HOSPITAL ENCOUNTER (OUTPATIENT)
Age: 46
Discharge: HOME | End: 2021-04-24
Payer: COMMERCIAL

## 2021-04-24 DIAGNOSIS — E11.9 TYPE 2 DIABETES MELLITUS WITHOUT COMPLICATION, WITHOUT LONG-TERM CURRENT USE OF INSULIN (HCC): ICD-10-CM

## 2021-04-24 PROCEDURE — 83036 HEMOGLOBIN GLYCOSYLATED A1C: CPT

## 2021-04-24 PROCEDURE — 36415 COLL VENOUS BLD VENIPUNCTURE: CPT

## 2021-04-25 LAB
ESTIMATED AVERAGE GLUCOSE: 162.8 MG/DL
HBA1C MFR BLD: 7.3 %

## 2021-04-27 ENCOUNTER — TELEMEDICINE (OUTPATIENT)
Dept: INTERNAL MEDICINE CLINIC | Age: 46
End: 2021-04-27
Payer: COMMERCIAL

## 2021-04-27 DIAGNOSIS — E66.01 MORBID OBESITY WITH BMI OF 40.0-44.9, ADULT (HCC): ICD-10-CM

## 2021-04-27 DIAGNOSIS — I10 ESSENTIAL HYPERTENSION: ICD-10-CM

## 2021-04-27 DIAGNOSIS — E11.9 TYPE 2 DIABETES MELLITUS WITHOUT COMPLICATION, WITHOUT LONG-TERM CURRENT USE OF INSULIN (HCC): Primary | ICD-10-CM

## 2021-04-27 DIAGNOSIS — F32.A ANXIETY AND DEPRESSION: ICD-10-CM

## 2021-04-27 DIAGNOSIS — F41.9 ANXIETY AND DEPRESSION: ICD-10-CM

## 2021-04-27 PROCEDURE — 3051F HG A1C>EQUAL 7.0%<8.0%: CPT | Performed by: INTERNAL MEDICINE

## 2021-04-27 PROCEDURE — 99214 OFFICE O/P EST MOD 30 MIN: CPT | Performed by: INTERNAL MEDICINE

## 2021-04-27 NOTE — PROGRESS NOTES
Hyperlipidemia with target LDL less than 100    Essential hypertension    Type 2 diabetes mellitus without complication, without long-term current use of insulin (HCC)    MICHAEL (obstructive sleep apnea)    S/P laparoscopic sleeve gastrectomy    Morbid obesity with BMI of 40.0-44.9, adult ([facility] [address]    Asthma due to seasonal allergies    Erectile dysfunction associated with type 2 diabetes mellitus (HCC)    Perforation of left tympanic membrane    Primary insomnia    Anxiety and depression       Allergies   Allergen Reactions    Actos [Pioglitazone] Swelling     Diffuse weight gain     Outpatient Medications Marked as Taking for the 4/27/21 encounter (Telemedicine) with Aaron Franco MD   Medication Sig Dispense Refill    Empagliflozin-Linaglip-Metform (TRIJARDY XR) 12.5-2.5-1000 MG TB24 Take 1 tablet by mouth 2 times daily 180 tablet 3    glimepiride (AMARYL) 2 MG tablet Take 1 tablet by mouth 2 times daily 180 tablet 0    escitalopram (LEXAPRO) 20 MG tablet TAKE 1 TABLET BY MOUTH ONE TIME A DAY 90 tablet 0    rosuvastatin (CRESTOR) 20 MG tablet Take 1 tablet by mouth nightly 90 tablet 3    buPROPion (WELLBUTRIN XL) 300 MG extended release tablet Take 1 tablet by mouth every morning 90 tablet 3    lisinopril (PRINIVIL;ZESTRIL) 20 MG tablet TAKE 1 TABLET BY MOUTH DAILY 90 tablet 3    montelukast (SINGULAIR) 10 MG tablet TAKE 1 TABLET BY MOUTH ONE TIME A DAY 90 tablet 3    GLUCOSAMINE HCL PO Take 2 tablets by mouth daily      blood glucose test strips (AGAMATRIX JAZZ TEST) strip 1 each by In Vitro route daily Use to test blood sugar daily.  Please dispense 1 box of 100. 100 each 3    Blood Glucose Monitoring Suppl (AGAMATRIX JAZZ WIRELESS 2) w/Device KIT 1 kit by Does not apply route See Admin Instructions 1 kit 0    Blood Glucose Calibration (AGAMATRIX CONTROL) Normal SOLN 1 Bottle by In Vitro route once for 1 dose 1 each 0    AgaMatrix Ultra-Thin Lancets MISC 1 box by Does not apply route daily Use to

## 2021-05-05 ENCOUNTER — OFFICE VISIT (OUTPATIENT)
Dept: PULMONOLOGY | Age: 46
End: 2021-05-05
Payer: COMMERCIAL

## 2021-05-05 VITALS
SYSTOLIC BLOOD PRESSURE: 132 MMHG | BODY MASS INDEX: 45.1 KG/M2 | HEIGHT: 70 IN | DIASTOLIC BLOOD PRESSURE: 70 MMHG | RESPIRATION RATE: 16 BRPM | HEART RATE: 82 BPM | OXYGEN SATURATION: 97 % | WEIGHT: 315 LBS

## 2021-05-05 DIAGNOSIS — G47.33 OSA (OBSTRUCTIVE SLEEP APNEA): Primary | ICD-10-CM

## 2021-05-05 PROCEDURE — 99213 OFFICE O/P EST LOW 20 MIN: CPT | Performed by: INTERNAL MEDICINE

## 2021-05-05 ASSESSMENT — SLEEP AND FATIGUE QUESTIONNAIRES
HOW LIKELY ARE YOU TO NOD OFF OR FALL ASLEEP WHEN YOU ARE A PASSENGER IN A CAR FOR AN HOUR WITHOUT A BREAK: 0
HOW LIKELY ARE YOU TO NOD OFF OR FALL ASLEEP WHILE SITTING AND READING: 0
ESS TOTAL SCORE: 3
HOW LIKELY ARE YOU TO NOD OFF OR FALL ASLEEP WHILE SITTING AND TALKING TO SOMEONE: 0
HOW LIKELY ARE YOU TO NOD OFF OR FALL ASLEEP WHILE LYING DOWN TO REST IN THE AFTERNOON WHEN CIRCUMSTANCES PERMIT: 3
NECK CIRCUMFERENCE (INCHES): 18

## 2021-05-05 NOTE — PROGRESS NOTES
CC: MICHAEL  HPI   Interval History:   New CPAP machine:  Average use is now 6.1 hours over last 30 days, AHI is 0.9, setting is 11-15     HPI 2019: 40 yo with greater than 5-year history of MICHAEL, treated with CPAP 11 with improvement in sleep quality and sleepiness. Never sleeps without it no associated sleepiness. Last compliance report showed 30/30, average 7 hours, AHI is less than 1 but that was more than 2 years ago. No modifying or exacerbating factors. reports that he quit smoking about 14 years ago. He has a 30.00 pack-year smoking history. He has never used smokeless tobacco.    Review of Systems     Objective:   Physical Exam   Blood pressure 132/70, pulse 82, resp. rate 16, height 5' 10\" (1.778 m), weight (!) 325 lb (147.4 kg), SpO2 97 %.'  Constitutional:  No acute distress. HENT:  Oropharynx is clear and moist. Mallampati Class 2  Neck: . No tracheal deviation present. No obvious masses. Neck Circumference: 18  Pulmonary/Chest:   No accessory muscle usage or stridor. Musculoskeletal:  No clubbing. Psychiatric: Normal mood and affect. CPAP titration Aug 2014 with AHI of zero on CPAP 11    PSG with AHI of 9    Assessment:      · Obstructive Sleep Apnea - mild - resolved while on CPAP 11 cm        Plan:      · APAP 11-15 cmH2O.   Patient has been counseled to never drive while sleepy   S/P COVID vaccine   F/U in about one year

## 2021-06-01 ENCOUNTER — TELEMEDICINE (OUTPATIENT)
Dept: INTERNAL MEDICINE CLINIC | Age: 46
End: 2021-06-01
Payer: COMMERCIAL

## 2021-06-01 DIAGNOSIS — F32.A ANXIETY AND DEPRESSION: ICD-10-CM

## 2021-06-01 DIAGNOSIS — F41.9 ANXIETY AND DEPRESSION: ICD-10-CM

## 2021-06-01 DIAGNOSIS — E11.9 TYPE 2 DIABETES MELLITUS WITHOUT COMPLICATION, WITHOUT LONG-TERM CURRENT USE OF INSULIN (HCC): ICD-10-CM

## 2021-06-01 PROCEDURE — 99213 OFFICE O/P EST LOW 20 MIN: CPT | Performed by: INTERNAL MEDICINE

## 2021-06-01 PROCEDURE — 3051F HG A1C>EQUAL 7.0%<8.0%: CPT | Performed by: INTERNAL MEDICINE

## 2021-06-01 RX ORDER — GLIMEPIRIDE 2 MG/1
2 TABLET ORAL 2 TIMES DAILY
Qty: 180 TABLET | Refills: 2 | Status: SHIPPED | OUTPATIENT
Start: 2021-06-01 | End: 2022-04-05

## 2021-06-01 RX ORDER — ESCITALOPRAM OXALATE 20 MG/1
TABLET ORAL
Qty: 90 TABLET | Refills: 2 | Status: SHIPPED | OUTPATIENT
Start: 2021-06-01 | End: 2022-01-31 | Stop reason: SDUPTHER

## 2021-06-01 SDOH — ECONOMIC STABILITY: FOOD INSECURITY: WITHIN THE PAST 12 MONTHS, THE FOOD YOU BOUGHT JUST DIDN'T LAST AND YOU DIDN'T HAVE MONEY TO GET MORE.: NEVER TRUE

## 2021-06-01 SDOH — ECONOMIC STABILITY: FOOD INSECURITY: WITHIN THE PAST 12 MONTHS, YOU WORRIED THAT YOUR FOOD WOULD RUN OUT BEFORE YOU GOT MONEY TO BUY MORE.: NEVER TRUE

## 2021-06-01 ASSESSMENT — SOCIAL DETERMINANTS OF HEALTH (SDOH): HOW HARD IS IT FOR YOU TO PAY FOR THE VERY BASICS LIKE FOOD, HOUSING, MEDICAL CARE, AND HEATING?: NOT HARD AT ALL

## 2021-06-01 NOTE — PROGRESS NOTES
Date of Service:  6/1/2021    Chief Complaint:      Chief Complaint   Patient presents with    Diabetes       HPI:  Lisa Ceja is a 50 y.o. Pursuant to the emergency declaration under the [facility] [place] [address] [facility] authority and the [employer] Act, this Virtual  Video Visit was conducted, with patient's consent, to reduce the patient's risk of exposure to COVID-19 and provide continuity of care. Service is  provided through a video synchronous discussion virtually to substitute for in-person clinic visit with the patient being at home and Dr. Rachel Sommer being at home. Patient consent to the video visit. Treatment Adherence:   Medication compliance: compliant all of the time   Diet compliance: compliant most of the time   Weight trend: decreasing   Current exercise: walks 3 time(s) per week   Barriers: time constraints   Diabetes Mellitus Type 2: Current symptoms/problems include: none.  Trijardy XR 12.5-2. mg bid and Amaryl 2 mg bid.  He has lost weight since getting off Actos. Home blood sugar records: fasting range in March:120-137 before breakfast and 100-120 before dinner  Any episodes of hypoglycemia? no   Eye exam current (within one year): yes   Tobacco history: He reports that he quit smoking about 7 years ago. He has never used smokeless tobacco.   Daily Aspirin? No: will start   Known diabetic complications: none   Hypertension: Home blood pressure monitoring: yes 130/70 on Lisinopril 20 mg qd. He is adherent to a low sodium diet. Patient denies chest pain, shortness of breath, headache, lightheadedness, blurred vision, peripheral edema, palpitations, dry cough and fatigue. Antihypertensive medication side effects: no medication side effects noted. Use of agents associated with hypertension: none. Hyperlipidemia: No new myalgias or GI upset on simvastatin (Zocor) 20 mg qhs.    Adjustment d/o with depression:  much improve and rarely feel depress on Wellbutrin  mg qam and Lexapro 20 mg qd.  He's off Cymbalta 60 mg bid due to edema.  No SI or HI.  He has been seeing a counselor every 6-8 weeks. Insomnia: stable off trazodone 150 mg prn couple of weeks  Exercise/allergies induce Asthma:  Stable on Singulair 10 mg qhs and have not need Flonase. 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 No pain or hearing loss. Obesity: He's had sleeve surgery 11/10/14. Anemia: not been on any iron pill but is taking a multivitamin due to constipation. Vit D3: stable on Vit D3 2000 U qd.      Lab Results   Component Value Date    LABA1C 7.3 04/24/2021    LABA1C 7.3 02/01/2021    LABA1C 7.1 12/02/2020    LABMICR 8.60 (H) 02/01/2021     Lab Results   Component Value Date     02/01/2021    K 4.1 02/01/2021     02/01/2021    CO2 28 02/01/2021    BUN 10 02/01/2021    CREATININE 1.0 02/01/2021    GLUCOSE 93 02/01/2021    CALCIUM 9.6 02/01/2021     Lab Results   Component Value Date    CHOL 205 02/01/2021    TRIG 151 02/01/2021    HDL 52 02/01/2021    HDL 43 05/23/2012    LDLCALC 123 02/01/2021    LDLDIRECT 76 03/13/2013     Lab Results   Component Value Date    ALT 29 02/01/2021    AST 19 02/01/2021     Lab Results   Component Value Date    TSH 2.02 08/24/2016    TSH 0.93 06/12/2015     Lab Results   Component Value Date    WBC 5.4 02/01/2021    HGB 13.1 (L) 02/01/2021    HCT 40.3 (L) 02/01/2021    MCV 89.4 02/01/2021     02/01/2021     Lab Results   Component Value Date    INR 0.84 (L) 03/13/2013      No results found for: PSA   No results found for: LABURIC     Patient Active Problem List   Diagnosis    Hyperlipidemia with target LDL less than 100    Essential hypertension    Type 2 diabetes mellitus without complication, without long-term current use of insulin (HCC)    MICHAEL (obstructive sleep apnea)  S/P laparoscopic sleeve gastrectomy    Morbid obesity with BMI of 40.0-44.9, adult ([facility] [address]    Asthma due to seasonal allergies    Erectile dysfunction associated with type 2 diabetes mellitus ([facility] [address]    Perforation of left tympanic membrane    Primary insomnia    Anxiety and depression       Allergies   Allergen Reactions    Actos [Pioglitazone] Swelling     Diffuse weight gain     Outpatient Medications Marked as Taking for the 6/1/21 encounter (Telemedicine) with Ramon Franco MD   Medication Sig Dispense Refill    Empagliflozin-Linaglip-Metform (TRIJARDY XR) 12.5-2.5-1000 MG TB24 Take 1 tablet by mouth 2 times daily 180 tablet 3    glimepiride (AMARYL) 2 MG tablet Take 1 tablet by mouth 2 times daily 180 tablet 0    escitalopram (LEXAPRO) 20 MG tablet TAKE 1 TABLET BY MOUTH ONE TIME A DAY 90 tablet 0    rosuvastatin (CRESTOR) 20 MG tablet Take 1 tablet by mouth nightly 90 tablet 3    buPROPion (WELLBUTRIN XL) 300 MG extended release tablet Take 1 tablet by mouth every morning 90 tablet 3    lisinopril (PRINIVIL;ZESTRIL) 20 MG tablet TAKE 1 TABLET BY MOUTH DAILY 90 tablet 3    montelukast (SINGULAIR) 10 MG tablet TAKE 1 TABLET BY MOUTH ONE TIME A DAY 90 tablet 3    GLUCOSAMINE HCL PO Take 2 tablets by mouth daily      blood glucose test strips (AGAMATRIX JAZZ TEST) strip 1 each by In Vitro route daily Use to test blood sugar daily. Please dispense 1 box of 100. 100 each 3    Blood Glucose Monitoring Suppl (AGAMATRIX JAZZ WIRELESS 2) w/Device KIT 1 kit by Does not apply route See Admin Instructions 1 kit 0    Blood Glucose Calibration (AGAMATRIX CONTROL) Normal SOLN 1 Bottle by In Vitro route once for 1 dose 1 each 0    AgaMatrix Ultra-Thin Lancets MISC 1 box by Does not apply route daily Use to test blood sugar once daily.  100 each 3    aspirin 325 MG EC tablet Take 1 tablet by mouth daily 30 tablet 0    Multiple Vitamins-Minerals (THERAPEUTIC MULTIVITAMIN-MINERALS) tablet Take 1 tablet by

## 2021-07-01 ENCOUNTER — HOSPITAL ENCOUNTER (OUTPATIENT)
Age: 46
Discharge: HOME | End: 2021-07-01
Payer: COMMERCIAL

## 2021-07-01 DIAGNOSIS — E11.9 TYPE 2 DIABETES MELLITUS WITHOUT COMPLICATION, WITHOUT LONG-TERM CURRENT USE OF INSULIN (HCC): ICD-10-CM

## 2021-07-01 PROCEDURE — 83036 HEMOGLOBIN GLYCOSYLATED A1C: CPT

## 2021-07-01 PROCEDURE — 36415 COLL VENOUS BLD VENIPUNCTURE: CPT

## 2021-07-02 LAB
ESTIMATED AVERAGE GLUCOSE: 142.7 MG/DL
HBA1C MFR BLD: 6.6 %

## 2021-09-22 ENCOUNTER — PATIENT MESSAGE (OUTPATIENT)
Dept: INTERNAL MEDICINE CLINIC | Age: 46
End: 2021-09-22

## 2021-09-27 NOTE — TELEPHONE ENCOUNTER
From: WALTER SILVA MD  To: Aleksey Acosta  Sent: 9/22/2021 2:59 PM EDT  Subject: Appointment moved to 10/5/21 at 7:50    Darren Yoder,    I have moved your appointment to 10/5 at 7:50, if that doesn't work for you, please call to reschedule or you can also reschedule online, thanks.

## 2021-10-05 ENCOUNTER — TELEMEDICINE (OUTPATIENT)
Dept: INTERNAL MEDICINE CLINIC | Age: 46
End: 2021-10-05
Payer: COMMERCIAL

## 2021-10-05 DIAGNOSIS — F32.A ANXIETY AND DEPRESSION: ICD-10-CM

## 2021-10-05 DIAGNOSIS — I10 ESSENTIAL HYPERTENSION: ICD-10-CM

## 2021-10-05 DIAGNOSIS — E78.5 HYPERLIPIDEMIA WITH TARGET LDL LESS THAN 100: ICD-10-CM

## 2021-10-05 DIAGNOSIS — F41.9 ANXIETY AND DEPRESSION: ICD-10-CM

## 2021-10-05 DIAGNOSIS — F51.01 PRIMARY INSOMNIA: ICD-10-CM

## 2021-10-05 DIAGNOSIS — E11.9 TYPE 2 DIABETES MELLITUS WITHOUT COMPLICATION, WITHOUT LONG-TERM CURRENT USE OF INSULIN (HCC): Primary | ICD-10-CM

## 2021-10-05 DIAGNOSIS — E66.01 MORBID OBESITY WITH BMI OF 40.0-44.9, ADULT (HCC): ICD-10-CM

## 2021-10-05 DIAGNOSIS — J45.909 ASTHMA DUE TO SEASONAL ALLERGIES: ICD-10-CM

## 2021-10-05 PROCEDURE — 99214 OFFICE O/P EST MOD 30 MIN: CPT | Performed by: INTERNAL MEDICINE

## 2021-10-05 NOTE — PROGRESS NOTES
Pursuant to the emergency declaration under the [address], [facility] waiver authority and the [employer] Act, this Virtual  Video Visit was conducted, with patient's consent, to reduce the patient's risk of exposure to COVID-19 and provide continuity of care. Service is  provided through a video synchronous discussion virtually to substitute for in-person clinic visit with the patient being at home and Dr. Ladarius Ramey being at home. Patient consent to the video visit. Date of Service:  10/5/2021    Chief Complaint:      Chief Complaint   Patient presents with    Diabetes    Depression    Insomnia       Assessment/Plan:    Elizabet Marr was seen today for diabetes, depression and insomnia. Diagnoses and all orders for this visit:    Type 2 diabetes mellitus without complication, without long-term current use of insulin ([facility])    Anxiety and depression    Essential hypertension    Hyperlipidemia with target LDL less than 100    Asthma due to seasonal allergies    Primary insomnia    Morbid obesity with BMI of 40.0-44.9, adult ([address]      Stable and continue on current medications. Return Jan 31 at 8 Fasting Physical.      HPI:  Brigid Olguin is a 50 y.o. Treatment Adherence:   Medication compliance: compliant all of the time   Diet compliance: compliant most of the time   Weight trend: decreasing   Current exercise: walks 3 time(s) per week   Barriers: time constraints   Diabetes Mellitus Type 2: Current symptoms/problems include: none.  Trijardy XR 12.5-2. mg bid and Amaryl 2 mg bid.  He has lost weight since getting off Actos. Home blood sugar records: fasting range in March:120-137 before breakfast and  before dinner  Any episodes of hypoglycemia? no   Eye exam current (within one year): yes   Tobacco history: He reports that he quit smoking about 7 years ago.  He has never used smokeless tobacco.   Daily Aspirin? No: will start   Known diabetic complications: none   Hypertension: Home blood pressure monitoring: yes 130/70 on Lisinopril 20 mg qd. He is adherent to a low sodium diet. Patient denies chest pain, shortness of breath, headache, lightheadedness, blurred vision, peripheral edema, palpitations, dry cough and fatigue. Antihypertensive medication side effects: no medication side effects noted. Use of agents associated with hypertension: none. Hyperlipidemia: No new myalgias or GI upset on simvastatin (Zocor) 20 mg qhs. Adjustment d/o with depression:  much improve and rarely feel depress on Wellbutrin  mg qam and Lexapro 20 mg qd.  He's off Cymbalta 60 mg bid due to edema.  No SI or HI.  He has been seeing a counselor every 6-8 weeks. Insomnia: stable off trazodone 150 mg prn couple of weeks  Exercise/allergies induce Asthma:  Stable on Singulair 10 mg qhs and have not need Flonase. 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 No pain or hearing loss. Obesity: He's had sleeve surgery 11/10/14. Anemia: not been on any iron pill but is taking a multivitamin due to constipation. Vit D3: stable on Vit D3 2000 U qd.      Lab Results   Component Value Date    LABA1C 6.6 07/01/2021    LABA1C 7.3 04/24/2021    LABA1C 7.3 02/01/2021    LABMICR 8.60 (H) 02/01/2021     Lab Results   Component Value Date     02/01/2021    K 4.1 02/01/2021     02/01/2021    CO2 28 02/01/2021    BUN 10 02/01/2021    CREATININE 1.0 02/01/2021    GLUCOSE 93 02/01/2021    CALCIUM 9.6 02/01/2021     Lab Results   Component Value Date    CHOL 205 02/01/2021    TRIG 151 02/01/2021    HDL 52 02/01/2021    HDL 43 05/23/2012    LDLCALC 123 02/01/2021    LDLDIRECT 76 03/13/2013     Lab Results   Component Value Date    ALT 29 02/01/2021    AST 19 02/01/2021     Lab Results   Component Value Date    TSH 2.02 08/24/2016    TSH 0.93 06/12/2015     Lab Results   Component Value Date    WBC 5.4 02/01/2021    HGB 13.1 (L) 02/01/2021    HCT 40.3 (L) 02/01/2021    MCV 89.4 02/01/2021     02/01/2021     Lab Results   Component Value Date    INR 0.84 (L) 03/13/2013      No results found for: PSA   No results found for: [facility]     Patient Active Problem List   Diagnosis    Hyperlipidemia with target LDL less than 100    Essential hypertension    Type 2 diabetes mellitus without complication, without long-term current use of insulin (HCC)    MICHAEL (obstructive sleep apnea)    S/P laparoscopic sleeve gastrectomy    Morbid obesity with BMI of 40.0-44.9, adult ([address]    Asthma due to seasonal allergies    Erectile dysfunction associated with type 2 diabetes mellitus ([facility]    Perforation of left tympanic membrane    Primary insomnia    Anxiety and depression       Allergies   Allergen Reactions    Actos [Pioglitazone] Swelling     Diffuse weight gain     Outpatient Medications Marked as Taking for the 10/5/21 encounter (Telemedicine) with Baron Rekha Franco MD   Medication Sig Dispense Refill    glimepiride (AMARYL) 2 MG tablet Take 1 tablet by mouth 2 times daily 180 tablet 2    escitalopram (LEXAPRO) 20 MG tablet TAKE 1 TABLET BY MOUTH ONE TIME A DAY 90 tablet 2    Empagliflozin-Linaglip-Metform (TRIJARDY XR) 12.5-2.5-1000 MG TB24 Take 1 tablet by mouth 2 times daily 180 tablet 3    rosuvastatin (CRESTOR) 20 MG tablet Take 1 tablet by mouth nightly 90 tablet 3    buPROPion (WELLBUTRIN XL) 300 MG extended release tablet Take 1 tablet by mouth every morning 90 tablet 3    lisinopril (PRINIVIL;ZESTRIL) 20 MG tablet TAKE 1 TABLET BY MOUTH DAILY 90 tablet 3    montelukast (SINGULAIR) 10 MG tablet TAKE 1 TABLET BY MOUTH ONE TIME A DAY 90 tablet 3    GLUCOSAMINE HCL PO Take 2 tablets by mouth daily      blood glucose test strips (AGAMATRIX JAZZ TEST) strip 1 each by In Vitro route daily Use to test blood sugar daily.  Please dispense 1 box of 100. 100 each 3    Blood Glucose Monitoring Suppl (AGAMATRIX JAZZ WIRELESS 2) w/Device KIT 1 kit by Does not apply route See Admin Instructions 1 kit 0    Blood Glucose Calibration (AGAMATRIX CONTROL) Normal SOLN 1 Bottle by In Vitro route once for 1 dose 1 each 0    AgaMatrix Ultra-Thin Lancets MISC 1 box by Does not apply route daily Use to test blood sugar once daily. 100 each 3    aspirin 325 MG EC tablet Take 1 tablet by mouth daily 30 tablet 0    Multiple Vitamins-Minerals (THERAPEUTIC MULTIVITAMIN-MINERALS) tablet Take 1 tablet by mouth daily      sildenafil (REVATIO) 20 MG tablet Take 1 tablet by mouth daily as needed (ED) 50 tablet 3    Cholecalciferol (VITAMIN D3) 2000 UNITS CAPS Take 2,000 Units by mouth daily       Blood Pressure Monitoring (B-D ASSURE BPM/AUTO ARM CUFF) MISC 1 Device by Does not apply route daily. 1 each 0         Review of Systems: 14 systems were negative except of what was stated on HPI    Nursing note and vitals reviewed. There were no vitals filed for this visit. Wt Readings from Last 3 Encounters:   05/05/21 (!) 325 lb (147.4 kg)   02/01/21 (!) 327 lb (148.3 kg)   12/02/20 (!) 331 lb (150.1 kg)     BP Readings from Last 3 Encounters:   05/05/21 132/70   02/01/21 (!) 148/84   12/02/20 130/74     There is no height or weight on file to calculate BMI. Constitutional: Patient appears well-developed and well-nourished. No distress. Head: Normocephalic and atraumatic. Skin: No rash or erythema. Psychiatric: Normal mood and affect.  Behavior is normal.

## 2022-01-18 ENCOUNTER — TELEPHONE (OUTPATIENT)
Dept: PHARMACY | Facility: CLINIC | Age: 47
End: 2022-01-18

## 2022-01-18 NOTE — TELEPHONE ENCOUNTER
Pharmacy Pop Care Documentation:   2022 Annual Pharmacy  Visit     Called patient to complete yearly pharmacy appointment to discuss the 2022 Diabetes Management Program.     No answer. Left VM on [phone] TAD: Please call back at [phone] Option #3.      [address] [facility]   Department, toll free: [phone] Option #3

## 2022-01-20 NOTE — TELEPHONE ENCOUNTER
Second attempt made to contact patient to complete 2022 Annual Pharmacy  Visit for the DM Program.  No answer. Left VM on [phone] TAD: Please call back at [phone] Option #3. [employer] message sent to patient.      [address] [facility]   Department, toll free: [phone] Option #3    For Pharmacy Admin Tracking Only     CPA in place:  No   Recommendation Provided To: Patient/Caregiver: 1 via Telephone and [employer] Message   Gap Closed?: No    Intervention Accepted By: Patient/Caregiver: 0   Time Spent (min): 10

## 2022-01-31 ENCOUNTER — OFFICE VISIT (OUTPATIENT)
Dept: INTERNAL MEDICINE CLINIC | Age: 47
End: 2022-01-31
Payer: COMMERCIAL

## 2022-01-31 VITALS
BODY MASS INDEX: 44.67 KG/M2 | HEIGHT: 70 IN | HEART RATE: 90 BPM | OXYGEN SATURATION: 96 % | DIASTOLIC BLOOD PRESSURE: 82 MMHG | WEIGHT: 312 LBS | SYSTOLIC BLOOD PRESSURE: 136 MMHG

## 2022-01-31 DIAGNOSIS — J45.909 ASTHMA DUE TO SEASONAL ALLERGIES: ICD-10-CM

## 2022-01-31 DIAGNOSIS — I10 ESSENTIAL HYPERTENSION: ICD-10-CM

## 2022-01-31 DIAGNOSIS — F51.01 PRIMARY INSOMNIA: ICD-10-CM

## 2022-01-31 DIAGNOSIS — E78.5 HYPERLIPIDEMIA WITH TARGET LDL LESS THAN 100: ICD-10-CM

## 2022-01-31 DIAGNOSIS — R51.9 HEADACHE DISORDER: ICD-10-CM

## 2022-01-31 DIAGNOSIS — Z00.00 ENCOUNTER FOR WELL ADULT EXAM WITHOUT ABNORMAL FINDINGS: Primary | ICD-10-CM

## 2022-01-31 DIAGNOSIS — F41.9 ANXIETY AND DEPRESSION: ICD-10-CM

## 2022-01-31 DIAGNOSIS — E11.9 TYPE 2 DIABETES MELLITUS WITHOUT COMPLICATION, WITHOUT LONG-TERM CURRENT USE OF INSULIN (HCC): ICD-10-CM

## 2022-01-31 DIAGNOSIS — F32.A ANXIETY AND DEPRESSION: ICD-10-CM

## 2022-01-31 LAB
A/G RATIO: 1.6 (ref 1.1–2.2)
ALBUMIN SERPL-MCNC: 4.4 G/DL (ref 3.4–5)
ALP BLD-CCNC: 81 U/L (ref 40–129)
ALT SERPL-CCNC: 25 U/L (ref 10–40)
ANION GAP SERPL CALCULATED.3IONS-SCNC: 15 MMOL/L (ref 3–16)
AST SERPL-CCNC: 16 U/L (ref 15–37)
BASOPHILS ABSOLUTE: 0 K/UL (ref 0–0.2)
BASOPHILS RELATIVE PERCENT: 0.9 %
BILIRUB SERPL-MCNC: 0.3 MG/DL (ref 0–1)
BUN BLDV-MCNC: 10 MG/DL (ref 7–20)
CALCIUM SERPL-MCNC: 9.6 MG/DL (ref 8.3–10.6)
CHLORIDE BLD-SCNC: 100 MMOL/L (ref 99–110)
CHOLESTEROL, TOTAL: 145 MG/DL (ref 0–199)
CO2: 26 MMOL/L (ref 21–32)
CREAT SERPL-MCNC: 0.9 MG/DL (ref 0.9–1.3)
EOSINOPHILS ABSOLUTE: 0.2 K/UL (ref 0–0.6)
EOSINOPHILS RELATIVE PERCENT: 3.1 %
GFR AFRICAN AMERICAN: >60
GFR NON-AFRICAN AMERICAN: >60
GLUCOSE BLD-MCNC: 116 MG/DL (ref 70–99)
HBA1C MFR BLD: 7.2 %
HCT VFR BLD CALC: 41.8 % (ref 40.5–52.5)
HDLC SERPL-MCNC: 64 MG/DL (ref 40–60)
HEMOGLOBIN: 13.6 G/DL (ref 13.5–17.5)
LDL CHOLESTEROL CALCULATED: 61 MG/DL
LYMPHOCYTES ABSOLUTE: 1.5 K/UL (ref 1–5.1)
LYMPHOCYTES RELATIVE PERCENT: 30 %
MCH RBC QN AUTO: 28.6 PG (ref 26–34)
MCHC RBC AUTO-ENTMCNC: 32.5 G/DL (ref 31–36)
MCV RBC AUTO: 88.2 FL (ref 80–100)
MONOCYTES ABSOLUTE: 0.5 K/UL (ref 0–1.3)
MONOCYTES RELATIVE PERCENT: 9.8 %
NEUTROPHILS ABSOLUTE: 2.9 K/UL (ref 1.7–7.7)
NEUTROPHILS RELATIVE PERCENT: 56.2 %
PDW BLD-RTO: 14.9 % (ref 12.4–15.4)
PLATELET # BLD: 307 K/UL (ref 135–450)
PMV BLD AUTO: 7.2 FL (ref 5–10.5)
POTASSIUM SERPL-SCNC: 4.5 MMOL/L (ref 3.5–5.1)
RBC # BLD: 4.74 M/UL (ref 4.2–5.9)
SODIUM BLD-SCNC: 141 MMOL/L (ref 136–145)
TOTAL PROTEIN: 7.2 G/DL (ref 6.4–8.2)
TRIGL SERPL-MCNC: 98 MG/DL (ref 0–150)
VLDLC SERPL CALC-MCNC: 20 MG/DL
WBC # BLD: 5.1 K/UL (ref 4–11)

## 2022-01-31 PROCEDURE — 36415 COLL VENOUS BLD VENIPUNCTURE: CPT | Performed by: INTERNAL MEDICINE

## 2022-01-31 PROCEDURE — 83036 HEMOGLOBIN GLYCOSYLATED A1C: CPT | Performed by: INTERNAL MEDICINE

## 2022-01-31 PROCEDURE — 99396 PREV VISIT EST AGE 40-64: CPT | Performed by: INTERNAL MEDICINE

## 2022-01-31 PROCEDURE — 3051F HG A1C>EQUAL 7.0%<8.0%: CPT | Performed by: INTERNAL MEDICINE

## 2022-01-31 PROCEDURE — 99214 OFFICE O/P EST MOD 30 MIN: CPT | Performed by: INTERNAL MEDICINE

## 2022-01-31 RX ORDER — RIZATRIPTAN BENZOATE 10 MG/1
10 TABLET ORAL DAILY PRN
Qty: 30 TABLET | Refills: 0 | Status: SHIPPED | OUTPATIENT
Start: 2022-01-31 | End: 2022-06-08

## 2022-01-31 RX ORDER — BUPROPION HYDROCHLORIDE 300 MG/1
300 TABLET ORAL EVERY MORNING
Qty: 90 TABLET | Refills: 3 | Status: SHIPPED | OUTPATIENT
Start: 2022-01-31

## 2022-01-31 RX ORDER — EMPAGLIFLOZIN, LINAGLIPTIN, METFORMIN HYDROCHLORIDE 12.5; 2.5; 1 MG/1; MG/1; MG/1
1 TABLET, EXTENDED RELEASE ORAL 2 TIMES DAILY
Qty: 180 TABLET | Refills: 3 | Status: SHIPPED | OUTPATIENT
Start: 2022-01-31

## 2022-01-31 RX ORDER — MONTELUKAST SODIUM 10 MG/1
TABLET ORAL
Qty: 90 TABLET | Refills: 3 | Status: SHIPPED | OUTPATIENT
Start: 2022-01-31 | End: 2022-10-24 | Stop reason: SDUPTHER

## 2022-01-31 RX ORDER — ROSUVASTATIN CALCIUM 20 MG/1
20 TABLET, COATED ORAL NIGHTLY
Qty: 90 TABLET | Refills: 3 | Status: SHIPPED | OUTPATIENT
Start: 2022-01-31 | End: 2022-10-24 | Stop reason: SDUPTHER

## 2022-01-31 RX ORDER — ESCITALOPRAM OXALATE 20 MG/1
TABLET ORAL
Qty: 90 TABLET | Refills: 3 | Status: SHIPPED | OUTPATIENT
Start: 2022-01-31 | End: 2022-10-24 | Stop reason: SDUPTHER

## 2022-01-31 RX ORDER — LISINOPRIL 20 MG/1
TABLET ORAL
Qty: 90 TABLET | Refills: 3 | Status: SHIPPED | OUTPATIENT
Start: 2022-01-31 | End: 2022-01-31 | Stop reason: CLARIF

## 2022-01-31 ASSESSMENT — PATIENT HEALTH QUESTIONNAIRE - PHQ9
SUM OF ALL RESPONSES TO PHQ9 QUESTIONS 1 & 2: 3
3. TROUBLE FALLING OR STAYING ASLEEP: 3
6. FEELING BAD ABOUT YOURSELF - OR THAT YOU ARE A FAILURE OR HAVE LET YOURSELF OR YOUR FAMILY DOWN: 1
7. TROUBLE CONCENTRATING ON THINGS, SUCH AS READING THE NEWSPAPER OR WATCHING TELEVISION: 0
2. FEELING DOWN, DEPRESSED OR HOPELESS: 1
10. IF YOU CHECKED OFF ANY PROBLEMS, HOW DIFFICULT HAVE THESE PROBLEMS MADE IT FOR YOU TO DO YOUR WORK, TAKE CARE OF THINGS AT HOME, OR GET ALONG WITH OTHER PEOPLE: 2
1. LITTLE INTEREST OR PLEASURE IN DOING THINGS: 2
SUM OF ALL RESPONSES TO PHQ QUESTIONS 1-9: 10
SUM OF ALL RESPONSES TO PHQ QUESTIONS 1-9: 10
4. FEELING TIRED OR HAVING LITTLE ENERGY: 3
SUM OF ALL RESPONSES TO PHQ QUESTIONS 1-9: 10
SUM OF ALL RESPONSES TO PHQ QUESTIONS 1-9: 10
5. POOR APPETITE OR OVEREATING: 0
9. THOUGHTS THAT YOU WOULD BE BETTER OFF DEAD, OR OF HURTING YOURSELF: 0
8. MOVING OR SPEAKING SO SLOWLY THAT OTHER PEOPLE COULD HAVE NOTICED. OR THE OPPOSITE, BEING SO FIGETY OR RESTLESS THAT YOU HAVE BEEN MOVING AROUND A LOT MORE THAN USUAL: 0

## 2022-01-31 NOTE — PATIENT INSTRUCTIONS
Well Visit, Ages 25 to 48: Care Instructions  Overview     Well visits can help you stay healthy. Your doctor has checked your overall health and may have suggested ways to take good care of yourself. Your doctor also may have recommended tests. At home, you can help prevent illness with healthy eating, regular exercise, and other steps. Follow-up care is a key part of your treatment and safety. Be sure to make and go to all appointments, and call your doctor if you are having problems. It's also a good idea to know your test results and keep a list of the medicines you take. How can you care for yourself at home? · Get screening tests that you and your doctor decide on. Screening helps find diseases before any symptoms appear. · Eat healthy foods. Choose fruits, vegetables, whole grains, protein, and low-fat dairy foods. Limit fat, especially saturated fat. Reduce salt in your diet. · Limit alcohol. If you are a man, have no more than 2 drinks a day or 14 drinks a week. If you are a woman, have no more than 1 drink a day or 7 drinks a week. · Get at least 30 minutes of physical activity on most days of the week. Walking is a good choice. You also may want to do other activities, such as running, swimming, cycling, or playing tennis or team sports. Discuss any changes in your exercise program with your doctor. · Reach and stay at a healthy weight. This will lower your risk for many problems, such as obesity, diabetes, heart disease, and high blood pressure. · Do not smoke or allow others to smoke around you. If you need help quitting, talk to your doctor about stop-smoking programs and medicines. These can increase your chances of quitting for good. · Care for your mental health. It is easy to get weighed down by worry and stress. Learn strategies to manage stress, like deep breathing and mindfulness, and stay connected with your family and community.  If you find you often feel sad or hopeless, talk with your doctor. Treatment can help. · Talk to your doctor about whether you have any risk factors for sexually transmitted infections (STIs). You can help prevent STIs if you wait to have sex with a new partner (or partners) until you've each been tested for STIs. It also helps if you use condoms (male or female condoms) and if you limit your sex partners to one person who only has sex with you. Vaccines are available for some STIs, such as HPV. · Use birth control if it's important to you to prevent pregnancy. Talk with your doctor about the choices available and what might be best for you. · If you think you may have a problem with alcohol or drug use, talk to your doctor. This includes prescription medicines (such as amphetamines and opioids) and illegal drugs (such as cocaine and methamphetamine). Your doctor can help you figure out what type of treatment is best for you. · Protect your skin from too much sun. When you're outdoors from 10 a.m. to 4 p.m., stay in the shade or cover up with clothing and a hat with a wide brim. Wear sunglasses that block UV rays. Even when it's cloudy, put broad-spectrum sunscreen (SPF 30 or higher) on any exposed skin. · See a dentist one or two times a year for checkups and to have your teeth cleaned. · Wear a seat belt in the car. When should you call for help? Watch closely for changes in your health, and be sure to contact your doctor if you have any problems or symptoms that concern you. Where can you learn more? Go to [email or web address]://[email or web address] org and sign in to your [email or web address] account. Enter P072 in the [facility] box to learn more about \"Well Visit, Ages 25 to 48: Care Instructions. \"     If you do not have an account, please click on the \"Sign Up Now\" link. Current as of: October 6, 2021               Content Version: 13.1  © 4433-1305 [employer]. Care instructions adapted under license by [facility] ([facility]).  If you have questions about a medical condition or this instruction, always ask your healthcare professional. [place] [address] any warranty or liability for your use of this information.

## 2022-01-31 NOTE — PROGRESS NOTES
[facility]  History and Physical  Lucas Mercer M.D. Brian Beltran  YOB: 1975    Date of Service:  1/31/2022    Chief Complaint:   Brian Beltran is a 48 y.o. male who presents for   Chief Complaint   Patient presents with    Annual Exam     fasting,     Depression    Anxiety    Diabetes    Hyperlipidemia       Assessment/Plan:    Kaylin Harmon was seen today for annual exam, depression, anxiety, diabetes and hyperlipidemia. Diagnoses and all orders for this visit:    Encounter for well adult exam without abnormal findings  -     Lipid Panel  -     Comprehensive Metabolic Panel  -     CBC Auto Differential    Type 2 diabetes mellitus without complication, without long-term current use of insulin (HCC)  -     POCT glycosylated hemoglobin (Hb A1C)  -     Empagliflozin-Linaglip-Metform (TRIJARDY XR) 12.5-2.5-1000 MG TB24; Take 1 tablet by mouth 2 times daily  He decline adding medication and will try to watch diet and exercise more first.    Essential hypertension  Increase lisinopril (PRINIVIL;ZESTRIL) 20 MG tablet; TAKE 1 TABLET BY MOUTH TWICE A DAY to see headache resolves and monitor blood pressure    Headache disorder  Start rizatriptan (MAXALT) 10 MG tablet; Take 1 tablet by mouth daily as needed for Migraine (may repeat in 2 hrs x2, maximum of 3 pills within 24 hr) May repeat in 2 hours if needed    Insomnia  See if improve with decreasing in headache     Hyperlipidemia with target LDL less than 100  -     rosuvastatin (CRESTOR) 20 MG tablet; Take 1 tablet by mouth nightly    Anxiety and depression  -     buPROPion (WELLBUTRIN XL) 300 MG extended release tablet; Take 1 tablet by mouth every morning  -     escitalopram (LEXAPRO) 20 MG tablet; TAKE 1 TABLET BY MOUTH ONE TIME A DAY    Asthma due to seasonal allergies  -     montelukast (SINGULAIR) 10 MG tablet; TAKE 1 TABLET BY MOUTH ONE TIME A DAY    Stable and continue on current medications.       Return VV 2-4 weeks HA, mood, sleep. HPI: Here for Annual Physical and Follow up. He's been having headaches most days for the past 1-2 months. No photophobia or nausea. Not getting much sleep due to pain. No history of headaches in the past.  He just only check his blood pressure twice recently and both have been high 150-170/80-90. He did double up on his lisinopril 20 mg twice a day about 3 times which did seem to help reduce his headache. Adjustment d/o with depression:  feeling more depress for a few months since not been out much due to Covid 19 and it's winter time on Wellbutrin  mg qam and Lexapro 20 mg qd.  He's off Cymbalta 60 mg bid due to edema.  No SI or HI.  He has been seeing a counselor every 6-8 weeks. Treatment Adherence:   Medication compliance: compliant all of the time   Diet compliance: compliant most of the time   Weight trend: decreasing   Current exercise: walks 3 time(s) per week   Barriers: time constraints   Diabetes Mellitus Type 2: Current symptoms/problems include: none.  Trijardy XR 12.5-2. mg bid and Amaryl 2 mg bid.  He has lost weight since getting off Actos. Home blood sugar records: fasting range: not been checking  Any episodes of hypoglycemia? no   Eye exam current (within one year): yes   Tobacco history: He reports that he quit smoking about 7 years ago. He has never used smokeless tobacco.   Daily Aspirin? No: will start   Known diabetic complications: none   Hypertension: Home blood pressure monitoring: yes 130/70 on Lisinopril 20 mg qd. He is adherent to a low sodium diet. Patient denies chest pain, shortness of breath, headache, lightheadedness, blurred vision, peripheral edema, palpitations, dry cough and fatigue. Antihypertensive medication side effects: no medication side effects noted. Use of agents associated with hypertension: none. Hyperlipidemia: No new myalgias or GI upset on simvastatin (Zocor) 20 mg qhs.      Insomnia: stable off trazodone 150 mg apnea)    S/P laparoscopic sleeve gastrectomy    Morbid obesity with BMI of 40.0-44.9, adult ([facility] [address]    Asthma due to seasonal allergies    Erectile dysfunction associated with type 2 diabetes mellitus (HCC)    Perforation of left tympanic membrane    Primary insomnia    Anxiety and depression       Allergies   Allergen Reactions    Actos [Pioglitazone] Swelling     Diffuse weight gain     Outpatient Medications Marked as Taking for the 1/31/22 encounter (Office Visit) with Omar Mooney MD   Medication Sig Dispense Refill    glimepiride (AMARYL) 2 MG tablet Take 1 tablet by mouth 2 times daily 180 tablet 2    escitalopram (LEXAPRO) 20 MG tablet TAKE 1 TABLET BY MOUTH ONE TIME A DAY 90 tablet 2    Empagliflozin-Linaglip-Metform (TRIJARDY XR) 12.5-2.5-1000 MG TB24 Take 1 tablet by mouth 2 times daily 180 tablet 3    rosuvastatin (CRESTOR) 20 MG tablet Take 1 tablet by mouth nightly 90 tablet 3    buPROPion (WELLBUTRIN XL) 300 MG extended release tablet Take 1 tablet by mouth every morning 90 tablet 3    lisinopril (PRINIVIL;ZESTRIL) 20 MG tablet TAKE 1 TABLET BY MOUTH DAILY 90 tablet 3    montelukast (SINGULAIR) 10 MG tablet TAKE 1 TABLET BY MOUTH ONE TIME A DAY 90 tablet 3    GLUCOSAMINE HCL PO Take 2 tablets by mouth daily      blood glucose test strips (AGAMATRIX JAZZ TEST) strip 1 each by In Vitro route daily Use to test blood sugar daily. Please dispense 1 box of 100. 100 each 3    Blood Glucose Monitoring Suppl (AGAMATRIX JAZZ WIRELESS 2) w/Device KIT 1 kit by Does not apply route See Admin Instructions 1 kit 0    Blood Glucose Calibration (AGAMATRIX CONTROL) Normal SOLN 1 Bottle by In Vitro route once for 1 dose 1 each 0    AgaMatrix Ultra-Thin Lancets MISC 1 box by Does not apply route daily Use to test blood sugar once daily.  100 each 3    aspirin 325 MG EC tablet Take 1 tablet by mouth daily 30 tablet 0    Multiple Vitamins-Minerals (THERAPEUTIC MULTIVITAMIN-MINERALS) tablet Take 1 tablet by mouth daily      sildenafil (REVATIO) 20 MG tablet Take 1 tablet by mouth daily as needed (ED) 50 tablet 3    Cholecalciferol (VITAMIN D3) 2000 UNITS CAPS Take 2,000 Units by mouth daily       Blood Pressure Monitoring (B-D ASSURE BPM/AUTO ARM CUFF) MISC 1 Device by Does not apply route daily.  1 each 0       Past Medical History:   Diagnosis Date    Anxiety and depression     Asthma due to seasonal allergies 10/6/2015    Bruised rib 08/2019    Dental bridge present     Dysthymia 10/8/2019    Gout 2010    Hyperlipidemia     Hypertension     Morbid obesity with BMI of 40.0-44.9, adult ([facility] [address]     Obesity (BMI 30-39.9) 2/12/2015    Type II or unspecified type diabetes mellitus without mention of complication, not stated as uncontrolled     Unspecified sleep apnea     uses cpap     Past Surgical History:   Procedure Laterality Date    CYST REMOVAL      DIAGNOSTIC CARDIAC CATH LAB PROCEDURE      KNEE ARTHROSCOPY Right 11/25/2019    RIGHT KNEE ARTHROSCOPY, PARTIAL MEDIAL MENISCECTOMY performed by Yelena Yeung MD at [address] [facility]  11/10/2014    LAPAROSCOPIC    TYMPANOPLASTY N/A 8/26/2019    LEFT MYRINGOPLASTY WITH PAPER PATCH performed by Taylor Cespedes DO at [address] OR     Family History   Problem Relation Age of Onset    High Blood Pressure Mother     High Cholesterol Mother     Diabetes Mother     Arthritis Mother     Heart Disease Father     High Blood Pressure Father     High Cholesterol Father     High Blood Pressure Sister     High Cholesterol Sister     Alcohol Abuse Paternal Uncle     COPD Paternal Uncle     Alcohol Abuse Maternal Cousin     Alcohol Abuse Paternal Cousin     Cancer Maternal Grandmother 45        Uterine    Cancer Paternal Grandfather         Throat    Alcohol Abuse Paternal Uncle     COPD Paternal Uncle     Alcohol Abuse Paternal Uncle      Social History     Socioeconomic History    Marital status:      Spouse name: Not on file    Number of children: Not on file    Years of education: Not on file    Highest education level: Not on file   Occupational History    Occupation: behavorial health     Employer: [employer]   Tobacco Use    Smoking status: Former Smoker     Packs/day: 1.50     Years: 20.00     Pack years: 30.00     Quit date: 1/1/2007     Years since quitting: 15.0    Smokeless tobacco: Never Used   Vaping Use    Vaping Use: Never used   Substance and Sexual Activity    Alcohol use: Yes     Comment: socially     Drug use: No    Sexual activity: Yes     Partners: Female   Other Topics Concern    Not on file   Social History Narrative    Not on file     Social Determinants of Health     Financial Resource Strain: Low Risk     Difficulty of Paying Living Expenses: Not hard at all   Food Insecurity: No Food Insecurity    Worried About [address] [email or web address] in the Last Year: Never true    [facility] [employer] in the Last Year: Never true   Transportation Needs:     Lack of Transportation (Medical): Not on file    Lack of Transportation (Non-Medical):  Not on file   Physical Activity:     Days of Exercise per Week: Not on file    Minutes of Exercise per Session: Not on file   Stress:     Feeling of Stress : Not on file   Social Connections:     Frequency of Communication with Friends and Family: Not on file    Frequency of Social Gatherings with Friends and Family: Not on file    Attends Rastafarian Services: Not on file    Active Member of Clubs or Organizations: Not on file    Attends Club or Organization Meetings: Not on file    Marital Status: Not on file   Intimate Partner Violence:     Fear of Current or Ex-Partner: Not on file    Emotionally Abused: Not on file    Physically Abused: Not on file    Sexually Abused: Not on file   Housing Stability:     Unable to Pay for Housing in the Last Year: Not on file    Number of [place] in the Last Year: Not on file    Unstable Housing in the Last Year: Not on file       Review of Systems:  A comprehensive review of systems was negative except for what was noted in the HPI. Physical Exam:   Vitals:    01/31/22 0803   BP: 136/82   Site: Left Upper Arm   Pulse: 90   SpO2: 96%   Weight: (!) 312 lb (141.5 kg)   Height: 5' 10\" (1.778 m)     Body mass index is 44.77 kg/m². Constitutional: He is oriented to person, place, and time. He appears well-developed and well-nourished. No distress. HEENT:   Head: Normocephalic and atraumatic. Right Ear: Tympanic membrane, external ear and ear canal normal.   Left Ear: Tympanic membrane, external ear and ear canal normal.   Mouth/Throat: Oropharynx is clear and moist and mucous membranes are normal. No oropharyngeal exudate or posterior oropharyngeal erythema. He has no cervical adenopathy. Eyes: Conjunctivae and extraocular motions are normal. Pupils are equal, round, and reactive to light. Neck:  Supple. No JVD present. Carotid bruit is not present. No mass and no thyromegaly present. Cardiovascular: Normal rate, regular rhythm, normal heart sounds and intact distal pulses. Pulmonary/Chest: Effort normal and breath sounds normal. No respiratory distress. He has no wheezes, rhonchi or rales. Abdominal: Soft, non-tender. Bowel sounds and aorta are normal. There is no organomegaly, mass or bruit. Musculoskeletal: Normal range of motion. He exhibits no edema. Neurological: He is alert and oriented to person, place, and time. He has normal reflexes. No cranial nerve deficit. Coordination normal.   Skin: Skin is warm, dry and intact. No suspicious lesions are noted.     Preventive Care:  Health Maintenance   Topic Date Due    Depression Monitoring  Never done    Colon cancer screen colonoscopy  Never done    COVID-19 Vaccine (3 - Booster for Moderna series) 06/20/2021    Flu vaccine (1) 09/01/2021    Diabetic foot exam  02/01/2022    Diabetic microalbuminuria test  02/01/2022    Lipid screen 02/01/2022    Potassium monitoring  02/01/2022    Creatinine monitoring  02/01/2022    A1C test (Diabetic or Prediabetic)  07/01/2022    Diabetic retinal exam  09/29/2023    DTaP/Tdap/Td vaccine (3 - Td or Tdap) 10/27/2030    Pneumococcal 0-64 years Vaccine (2 of 2 - PPSV23) 11/20/2038    Hepatitis B vaccine  Completed    HIV screen  Addressed    Hepatitis A vaccine  Aged Out    Hib vaccine  Aged Out    Meningococcal (ACWY) vaccine  Aged Out    Hepatitis C screen  Discontinued        Recommendations for Preventive Services Due: see orders and patient instructions/AVS.    Cardiovascular Disease Risk Counseling: Assessed the patient's risk to develop cardiovascular disease and reviewed main risk factors. Reviewed steps to reduce disease risk including:   · Quitting tobacco use, reducing amount smoked, or not starting the habit  · Making healthy food choices  · Being physically active and gradualy increasing activity levels   · Reduce weight and determine a healthy BMI goal  · Monitor blood pressure and treat if higher than 140/90 mmHg  · Maintain blood total cholesterol levels under 5 mmol/l or 190 mg/dl  · Maintain LDL cholesterol levels under 3.0 mmol/l or 115 mg/dl   · Control blood glucose levels  · Consider taking aspirin (75 mg daily), once blood pressure is controlled   Provided a follow up plan.   Time spent (minutes): 3

## 2022-02-01 DIAGNOSIS — Z12.11 SCREEN FOR COLON CANCER: Primary | ICD-10-CM

## 2022-02-01 NOTE — TELEPHONE ENCOUNTER
[employer] Program - Be Well With Diabetes  =================================================================  Brian Neff is a 48 y.o. male enrolled in the [address] [place] [address] with patient for yearly visit to discuss enrollment in program. Patient provided writer with verbal consent to remain in the program for this year. Program Requirements to be completed in 2022:  1. Two office visits in 2022 for diabetes (1st must be completed by 7/1/2022)  2. Two A1c levels in 2022 (1st must be completed by 7/1/2022)  3. Yearly lipid panel  4. Yearly urine microalbumin test  5. Diabetes education if A1c is over 8%  6. Taking an ACE/ARB medication if appropriate  7. Taking a statin medication if appropriate  8. Pneumonia vaccine up to date  5. Yearly flu shot (for 6061-4090 season)  10. Medication adherence over 70%. Patients who fall below 70% will be contacted by a pharmacist in the program to discuss any issues. Are you currently using [address] [place] [address] (home delivery pharmacy) to get your prescriptions? Yes    Would you like to speak with a pharmacist about the program, your diabetes, and/or your prescriptions? Patient requested outreach to provider be made for prescriptions for Agamatrix Jazz monitor and supplies, including test strips and lancets. Patient seeing Dr. Harriett Farrell for this. Patient stated he is testing twice daily. Dylan Whittaker Team  Phone: toll free [phone], option #3  Fax [phone]  Email: [email or web address]    For Pharmacy Admin Tracking Only    PHSO: Yes  Recommended intervention potential cost savings: 1  Time Spent (min): 15

## 2022-02-03 NOTE — TELEPHONE ENCOUNTER
Dr. Yeny Luna MD,    Your patient is currently enrolled in the Be Well with Diabetes program. After your patient's recent visit with a [facility] Clinical Pharmacist, the below were identified as opportunities to assist with their diabetes management (if patient is not eligible for below recommendations, please reply with reason/contraindication):   · Patient requested prescriptions for a new glucometer and supplies at his recent visit with us. I have pended orders for your convenience. Thank you,  BRET Baugh, PharmD, [address] [place] [address], toll free: [phone]

## 2022-02-04 RX ORDER — BLOOD SUGAR DIAGNOSTIC
STRIP MISCELLANEOUS
Qty: 200 EACH | Refills: 3 | Status: SHIPPED | OUTPATIENT
Start: 2022-02-04

## 2022-02-04 RX ORDER — BLOOD-GLUCOSE METER
KIT MISCELLANEOUS
Qty: 1 KIT | Refills: 0 | Status: SHIPPED | OUTPATIENT
Start: 2022-02-04

## 2022-02-04 RX ORDER — LANCETS 23 GAUGE
EACH MISCELLANEOUS
Qty: 200 EACH | Refills: 3 | Status: SHIPPED | OUTPATIENT
Start: 2022-02-04

## 2022-02-21 ENCOUNTER — TELEMEDICINE (OUTPATIENT)
Dept: INTERNAL MEDICINE CLINIC | Age: 47
End: 2022-02-21
Payer: COMMERCIAL

## 2022-02-21 DIAGNOSIS — E11.9 TYPE 2 DIABETES MELLITUS WITHOUT COMPLICATION, WITHOUT LONG-TERM CURRENT USE OF INSULIN (HCC): Primary | ICD-10-CM

## 2022-02-21 DIAGNOSIS — I10 ESSENTIAL HYPERTENSION: ICD-10-CM

## 2022-02-21 DIAGNOSIS — R51.9 HEADACHE DISORDER: ICD-10-CM

## 2022-02-21 PROCEDURE — 3051F HG A1C>EQUAL 7.0%<8.0%: CPT | Performed by: INTERNAL MEDICINE

## 2022-02-21 PROCEDURE — 99213 OFFICE O/P EST LOW 20 MIN: CPT | Performed by: INTERNAL MEDICINE

## 2022-02-21 RX ORDER — LISINOPRIL 40 MG/1
40 TABLET ORAL DAILY
Qty: 90 TABLET | Refills: 3 | Status: SHIPPED | OUTPATIENT
Start: 2022-02-21 | End: 2022-10-24 | Stop reason: SDUPTHER

## 2022-02-21 NOTE — PROGRESS NOTES
Pursuant to the emergency declaration under the [facility] waiver authority and the [employer] Act, this Virtual  Video Visit was conducted, with patient's consent, to reduce the patient's risk of exposure to COVID-19 and provide continuity of care. Service is  provided through a video synchronous discussion virtually to substitute for in-person clinic visit with the patient being at home and Dr. Pankaj Ware being at home. Patient consent to the video visit. Date of Service:  2/21/2022    Chief Complaint:      Chief Complaint   Patient presents with    Headache    Depression    Insomnia       Assessment/Plan:    Antonio Monterroso was seen today for headache, depression and insomnia. Diagnoses and all orders for this visit:    Type 2 diabetes mellitus without complication, without long-term current use of insulin (HCC)  -     Hemoglobin A1C; Future    Essential hypertension  -     lisinopril (PRINIVIL;ZESTRIL) 40 MG tablet; Take 1 tablet by mouth daily    Headache disorder      Stable and continue on current medications. Return July 5 at 8:20 Diabetes, BP, Mood, sleep. HPI:  Mary Bender is a 48 y. o.       Treatment Adherence:   Medication compliance: compliant all of the time   Diet compliance: compliant most of the time   Weight trend: decreasing   Current exercise: walks 3 time(s) per week   Barriers: time constraints   Diabetes Mellitus Type 2: Current symptoms/problems include: none.  Trijardy XR 12.5-2. mg bid and Amaryl 2 mg bid.  He has lost weight since getting off Actos. Home blood sugar records: fasting range: not been checking  Any episodes of hypoglycemia? no   Eye exam current (within one year): yes   Tobacco history: He reports that he quit smoking about 7 years ago. He has never used smokeless tobacco.   Daily Aspirin?  No: will start   Known diabetic complications: none   Hypertension: Home blood pressure monitoring: yes 130/70 on Lisinopril 40 mg qd. He is adherent to a low sodium diet. Patient denies chest pain, shortness of breath, headache, lightheadedness, blurred vision, peripheral edema, palpitations, dry cough and fatigue. Antihypertensive medication side effects: no medication side effects noted. Use of agents associated with hypertension: none. Hyperlipidemia: No new myalgias or GI upset on simvastatin (Zocor) 20 mg qhs.      Insomnia: stable off trazodone 150 mg prn couple of weeks  Exercise/allergies induce Asthma:  Stable on Singulair 10 mg qhs and have not need Flonase. 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 No pain or hearing loss. Obesity: He's had sleeve surgery 11/10/14. 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 Adjustment d/o with depression:  stable on Wellbutrin  mg qam and Lexapro 20 mg qd.  He's off Cymbalta 60 mg bid due to edema.  No SI or HI.  He has been seeing a counselor every 6-8 weeks.     Lab Results   Component Value Date    LABA1C 7.2 01/31/2022    LABA1C 6.6 07/01/2021    LABA1C 7.3 04/24/2021    LABMICR 8.60 (H) 02/01/2021     Lab Results   Component Value Date     01/31/2022    K 4.5 01/31/2022     01/31/2022    CO2 26 01/31/2022    BUN 10 01/31/2022    CREATININE 0.9 01/31/2022    GLUCOSE 116 (H) 01/31/2022    CALCIUM 9.6 01/31/2022     Lab Results   Component Value Date    CHOL 145 01/31/2022    TRIG 98 01/31/2022    HDL 64 01/31/2022    HDL 43 05/23/2012    LDLCALC 61 01/31/2022    LDLDIRECT 76 03/13/2013     Lab Results   Component Value Date    ALT 25 01/31/2022    AST 16 01/31/2022     Lab Results   Component Value Date    TSH 2.02 08/24/2016    TSH 0.93 06/12/2015     Lab Results   Component Value Date    WBC 5.1 01/31/2022    HGB 13.6 01/31/2022    HCT 41.8 01/31/2022    MCV 88.2 01/31/2022     01/31/2022     Lab Results   Component Value Date    INR 0.84 (L) 03/13/2013      No results found for: PSA   No results found for: [facility]     Patient Active Problem List   Diagnosis    Hyperlipidemia with target LDL less than 100    Essential hypertension    Type 2 diabetes mellitus without complication, without long-term current use of insulin (HCC)    MICHAEL (obstructive sleep apnea)    S/P laparoscopic sleeve gastrectomy    Morbid obesity with BMI of 40.0-44.9, adult ([facility] [address]    Asthma due to seasonal allergies    Erectile dysfunction associated with type 2 diabetes mellitus ([facility] [address]    Perforation of left tympanic membrane    Primary insomnia    Anxiety and depression       Allergies   Allergen Reactions    Actos [Pioglitazone] Swelling     Diffuse weight gain     Outpatient Medications Marked as Taking for the 2/21/22 encounter (Telemedicine) with Sherman Franco MD   Medication Sig Dispense Refill    blood glucose test strips (AGAMATRIX JAZZ TEST) strip Use to test blood sugar twice daily 200 each 3    Blood Glucose Monitoring Suppl (AGAMATRIX JAZZ WIRELESS 2) w/Device KIT Use to test blood sugar twice daily 1 kit 0    Lancets 28G MISC Use to test blood sugar twice daily (any lancet ok, pt getting agamatrix jazz) 200 each 3    rosuvastatin (CRESTOR) 20 MG tablet Take 1 tablet by mouth nightly 90 tablet 3    buPROPion (WELLBUTRIN XL) 300 MG extended release tablet Take 1 tablet by mouth every morning 90 tablet 3    montelukast (SINGULAIR) 10 MG tablet TAKE 1 TABLET BY MOUTH ONE TIME A DAY 90 tablet 3    Empagliflozin-Linaglip-Metform (TRIJARDY XR) 12.5-2.5-1000 MG TB24 Take 1 tablet by mouth 2 times daily 180 tablet 3    escitalopram (LEXAPRO) 20 MG tablet TAKE 1 TABLET BY MOUTH ONE TIME A DAY 90 tablet 3    rizatriptan (MAXALT) 10 MG tablet Take 1 tablet by mouth daily as needed for Migraine (may repeat in 2 hrs x2, maximum of 3 pills within 24 hr) May repeat in 2 hours if needed 30 tablet 0    glimepiride (AMARYL) 2 MG tablet Take 1 tablet by mouth 2 times daily 180 tablet 2    GLUCOSAMINE HCL PO Take 2 tablets by mouth daily      blood glucose test strips (AGAMATRIX JAZZ TEST) strip 1 each by In Vitro route daily Use to test blood sugar daily. Please dispense 1 box of 100. 100 each 3    Blood Glucose Monitoring Suppl (AGAMATRIX JAZZ WIRELESS 2) w/Device KIT 1 kit by Does not apply route See Admin Instructions 1 kit 0    Blood Glucose Calibration (AGAMATRIX CONTROL) Normal SOLN 1 Bottle by In Vitro route once for 1 dose 1 each 0    AgaMatrix Ultra-Thin Lancets MISC 1 box by Does not apply route daily Use to test blood sugar once daily. 100 each 3    aspirin 325 MG EC tablet Take 1 tablet by mouth daily 30 tablet 0    Multiple Vitamins-Minerals (THERAPEUTIC MULTIVITAMIN-MINERALS) tablet Take 1 tablet by mouth daily      sildenafil (REVATIO) 20 MG tablet Take 1 tablet by mouth daily as needed (ED) 50 tablet 3    Cholecalciferol (VITAMIN D3) 2000 UNITS CAPS Take 2,000 Units by mouth daily       Blood Pressure Monitoring (B-D ASSURE BPM/AUTO ARM CUFF) MISC 1 Device by Does not apply route daily. 1 each 0         Review of Systems: 14 systems were negative except of what was stated on HPI    Nursing note and vitals reviewed. There were no vitals filed for this visit. Wt Readings from Last 3 Encounters:   01/31/22 (!) 312 lb (141.5 kg)   05/05/21 (!) 325 lb (147.4 kg)   02/01/21 (!) 327 lb (148.3 kg)     BP Readings from Last 3 Encounters:   01/31/22 136/82   05/05/21 132/70   02/01/21 (!) 148/84     There is no height or weight on file to calculate BMI. Constitutional: Patient appears well-developed and well-nourished. No distress. Head: Normocephalic and atraumatic. Psychiatric: Normal mood and affect.  Behavior is normal.

## 2022-03-05 LAB — NONINV COLON CA DNA+OCC BLD SCRN STL QL: NEGATIVE

## 2022-03-12 DIAGNOSIS — F41.9 ANXIETY AND DEPRESSION: ICD-10-CM

## 2022-03-12 DIAGNOSIS — F32.A ANXIETY AND DEPRESSION: ICD-10-CM

## 2022-03-14 RX ORDER — ESCITALOPRAM OXALATE 20 MG/1
TABLET ORAL
Qty: 90 TABLET | Refills: 2 | OUTPATIENT
Start: 2022-03-14

## 2022-04-05 DIAGNOSIS — E11.9 TYPE 2 DIABETES MELLITUS WITHOUT COMPLICATION, WITHOUT LONG-TERM CURRENT USE OF INSULIN (HCC): ICD-10-CM

## 2022-04-05 RX ORDER — GLIMEPIRIDE 2 MG/1
TABLET ORAL
Qty: 180 TABLET | Refills: 0 | Status: SHIPPED | OUTPATIENT
Start: 2022-04-05 | End: 2022-06-09 | Stop reason: DRUGHIGH

## 2022-04-07 ENCOUNTER — HOSPITAL ENCOUNTER (OUTPATIENT)
Age: 47
Discharge: HOME | End: 2022-04-07
Payer: COMMERCIAL

## 2022-04-07 DIAGNOSIS — E11.9 TYPE 2 DIABETES MELLITUS WITHOUT COMPLICATION, WITHOUT LONG-TERM CURRENT USE OF INSULIN (HCC): ICD-10-CM

## 2022-04-07 PROCEDURE — 83036 HEMOGLOBIN GLYCOSYLATED A1C: CPT

## 2022-04-07 PROCEDURE — 36415 COLL VENOUS BLD VENIPUNCTURE: CPT

## 2022-04-08 LAB
ESTIMATED AVERAGE GLUCOSE: 182.9 MG/DL
HBA1C MFR BLD: 8 %

## 2022-04-11 NOTE — RESULT ENCOUNTER NOTE
Inform patient:  Your diabetes has worsen so I would like to schedule a virtual video tomorrow 7:50 or later appointment to go over your sugar.

## 2022-04-12 ENCOUNTER — TELEMEDICINE (OUTPATIENT)
Dept: INTERNAL MEDICINE CLINIC | Age: 47
End: 2022-04-12
Payer: COMMERCIAL

## 2022-04-12 DIAGNOSIS — E11.9 TYPE 2 DIABETES MELLITUS WITHOUT COMPLICATION, WITHOUT LONG-TERM CURRENT USE OF INSULIN (HCC): Primary | ICD-10-CM

## 2022-04-12 PROCEDURE — 99213 OFFICE O/P EST LOW 20 MIN: CPT | Performed by: INTERNAL MEDICINE

## 2022-04-12 PROCEDURE — 3052F HG A1C>EQUAL 8.0%<EQUAL 9.0%: CPT | Performed by: INTERNAL MEDICINE

## 2022-04-12 NOTE — PROGRESS NOTES
Pursuant to the emergency declaration under the [facility], [facility] waiver authority and the [employer] and [employer] Act, this Virtual  Video Visit was conducted, with patient's consent, to reduce the patient's risk of exposure to COVID-19 and provide continuity of care. Service is  provided through a video synchronous discussion virtually to substitute for in-person clinic visit with the patient being at home and Dr. Amari Braswell being at home. Patient consent to the video visit. Date of Service:  4/12/2022    Chief Complaint:      Chief Complaint   Patient presents with    Diabetes       Assessment/Plan:    Sandra Gaona was seen today for diabetes. Diagnoses and all orders for this visit:    Type 2 diabetes mellitus without complication, without long-term current use of insulin ([facility])  -     POCT glycosylated hemoglobin (Hb A1C); Future    Increase Glimepiride 4 mg twice a day    Return VV June 14 at 8:40 Diabetes. HPI:  Tom Harper is a 48 y.o. Treatment Adherence:   Medication compliance: compliant all of the time   Diet compliance: compliant most of the time   Weight trend: decreasing   Current exercise: walks 3 time(s) per week   Barriers: time constraints   Diabetes Mellitus Type 2: Current symptoms/problems include: none.  Trijardy XR 12.5-2. mg bid and Amaryl 2 mg bid.  He has lost weight since getting off Actos. Home blood sugar records: fasting range: 110 AM but not been checking before dinner. No change in diet or exercise. Any episodes of hypoglycemia? no   Eye exam current (within one year): yes   Tobacco history: He reports that he quit smoking about 7 years ago. He has never used smokeless tobacco.   Daily Aspirin? No: will start   Known diabetic complications: none   Hypertension: Home blood pressure monitoring: yes 130/70 on Lisinopril 40 mg qd. He is adherent to a low sodium diet.  Patient denies chest pain, shortness of breath, headache, lightheadedness, blurred vision, peripheral edema, palpitations, dry cough and fatigue. Antihypertensive medication side effects: no medication side effects noted. Use of agents associated with hypertension: none. Hyperlipidemia: No new myalgias or GI upset on simvastatin (Zocor) 20 mg qhs.      Insomnia: stable off trazodone 150 mg prn couple of weeks  Exercise/allergies induce Asthma:  Stable on Singulair 10 mg qhs and have not need Flonase. 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 No pain or hearing loss. Obesity: He's had sleeve surgery 11/10/14. Anemia due to blood donation every 16 weeks: not been on any iron pill since it's constipating but has been taking multivitamin daily. Adjustment d/o with depression:  stable on Wellbutrin  mg qam and Lexapro 20 mg qd.  He's off Cymbalta 60 mg bid due to edema.  No SI or HI.  He has been seeing a counselor every 6-8 weeks.     Lab Results   Component Value Date    LABA1C 8.0 04/07/2022    LABA1C 7.2 01/31/2022    LABA1C 6.6 07/01/2021    LABMICR 8.60 (H) 02/01/2021     Lab Results   Component Value Date     01/31/2022    K 4.5 01/31/2022     01/31/2022    CO2 26 01/31/2022    BUN 10 01/31/2022    CREATININE 0.9 01/31/2022    GLUCOSE 116 (H) 01/31/2022    CALCIUM 9.6 01/31/2022     Lab Results   Component Value Date    CHOL 145 01/31/2022    TRIG 98 01/31/2022    HDL 64 01/31/2022    HDL 43 05/23/2012    LDLCALC 61 01/31/2022    LDLDIRECT 76 03/13/2013     Lab Results   Component Value Date    ALT 25 01/31/2022    AST 16 01/31/2022     Lab Results   Component Value Date    TSH 2.02 08/24/2016    TSH 0.93 06/12/2015     Lab Results   Component Value Date    WBC 5.1 01/31/2022    HGB 13.6 01/31/2022    HCT 41.8 01/31/2022    MCV 88.2 01/31/2022     01/31/2022     Lab Results   Component Value Date    INR 0.84 (L) 03/13/2013      No results found for: PSA   No results found for: [facility]     Patient Active Problem List   Diagnosis    Hyperlipidemia with target LDL less than 100    Essential hypertension    Type 2 diabetes mellitus without complication, without long-term current use of insulin ([facility] [address]    MICHAEL (obstructive sleep apnea)    S/P laparoscopic sleeve gastrectomy    Morbid obesity with BMI of 40.0-44.9, adult ([address]    Asthma due to seasonal allergies    Erectile dysfunction associated with type 2 diabetes mellitus ([facility] [address]    Perforation of left tympanic membrane    Primary insomnia    Anxiety and depression       Allergies   Allergen Reactions    Actos [Pioglitazone] Swelling     Diffuse weight gain     Outpatient Medications Marked as Taking for the 4/12/22 encounter (Telemedicine) with Dutch Franco MD   Medication Sig Dispense Refill    glimepiride (AMARYL) 2 MG tablet TAKE 1 TABLET BY MOUTH 2 TIMES A  tablet 0    lisinopril (PRINIVIL;ZESTRIL) 40 MG tablet Take 1 tablet by mouth daily 90 tablet 3    blood glucose test strips (AGAMATRIX JAZZ TEST) strip Use to test blood sugar twice daily 200 each 3    Blood Glucose Monitoring Suppl (AGAMATRIX JAZZ WIRELESS 2) w/Device KIT Use to test blood sugar twice daily 1 kit 0    Lancets 28G MISC Use to test blood sugar twice daily (any lancet ok, pt getting agamatrix jazz) 200 each 3    rosuvastatin (CRESTOR) 20 MG tablet Take 1 tablet by mouth nightly 90 tablet 3    buPROPion (WELLBUTRIN XL) 300 MG extended release tablet Take 1 tablet by mouth every morning 90 tablet 3    montelukast (SINGULAIR) 10 MG tablet TAKE 1 TABLET BY MOUTH ONE TIME A DAY 90 tablet 3    Empagliflozin-Linaglip-Metform (TRIJARDY XR) 12.5-2.5-1000 MG TB24 Take 1 tablet by mouth 2 times daily 180 tablet 3    escitalopram (LEXAPRO) 20 MG tablet TAKE 1 TABLET BY MOUTH ONE TIME A DAY 90 tablet 3    rizatriptan (MAXALT) 10 MG tablet Take 1 tablet by mouth daily as needed for Migraine (may repeat in 2 hrs x2, maximum of 3 pills within 24 hr) May repeat in 2 hours if needed 30 tablet 0    GLUCOSAMINE HCL PO Take 2 tablets by mouth daily      blood glucose test strips (AGAMATRIX JAZZ TEST) strip 1 each by In Vitro route daily Use to test blood sugar daily. Please dispense 1 box of 100. 100 each 3    Blood Glucose Monitoring Suppl (AGAMATRIX JAZZ WIRELESS 2) w/Device KIT 1 kit by Does not apply route See Admin Instructions 1 kit 0    Blood Glucose Calibration (AGAMATRIX CONTROL) Normal SOLN 1 Bottle by In Vitro route once for 1 dose 1 each 0    AgaMatrix Ultra-Thin Lancets MISC 1 box by Does not apply route daily Use to test blood sugar once daily. 100 each 3    aspirin 325 MG EC tablet Take 1 tablet by mouth daily 30 tablet 0    Multiple Vitamins-Minerals (THERAPEUTIC MULTIVITAMIN-MINERALS) tablet Take 1 tablet by mouth daily      sildenafil (REVATIO) 20 MG tablet Take 1 tablet by mouth daily as needed (ED) 50 tablet 3    Cholecalciferol (VITAMIN D3) 2000 UNITS CAPS Take 2,000 Units by mouth daily       Blood Pressure Monitoring (B-D ASSURE BPM/AUTO ARM CUFF) MISC 1 Device by Does not apply route daily. 1 each 0         Review of Systems: 14 systems were negative except of what was stated on HPI    Nursing note and vitals reviewed. There were no vitals filed for this visit. Wt Readings from Last 3 Encounters:   01/31/22 (!) 312 lb (141.5 kg)   05/05/21 (!) 325 lb (147.4 kg)   02/01/21 (!) 327 lb (148.3 kg)     BP Readings from Last 3 Encounters:   01/31/22 136/82   05/05/21 132/70   02/01/21 (!) 148/84     There is no height or weight on file to calculate BMI. Constitutional: Patient appears well-developed and well-nourished. No distress. Head: Normocephalic and atraumatic. Psychiatric: Normal mood and affect.  Behavior is normal.

## 2022-05-02 ENCOUNTER — OFFICE VISIT (OUTPATIENT)
Dept: INTERNAL MEDICINE CLINIC | Age: 47
End: 2022-05-02
Payer: COMMERCIAL

## 2022-05-02 VITALS
HEIGHT: 70 IN | WEIGHT: 315 LBS | HEART RATE: 80 BPM | DIASTOLIC BLOOD PRESSURE: 66 MMHG | OXYGEN SATURATION: 97 % | SYSTOLIC BLOOD PRESSURE: 130 MMHG | BODY MASS INDEX: 45.1 KG/M2

## 2022-05-02 DIAGNOSIS — M77.12 LEFT LATERAL EPICONDYLITIS: Primary | ICD-10-CM

## 2022-05-02 DIAGNOSIS — S16.1XXA ACUTE STRAIN OF NECK MUSCLE, INITIAL ENCOUNTER: ICD-10-CM

## 2022-05-02 PROCEDURE — 99213 OFFICE O/P EST LOW 20 MIN: CPT | Performed by: INTERNAL MEDICINE

## 2022-05-02 RX ORDER — MELOXICAM 15 MG/1
15 TABLET ORAL DAILY
Qty: 30 TABLET | Refills: 0 | Status: SHIPPED | OUTPATIENT
Start: 2022-05-02 | End: 2022-06-14 | Stop reason: ALTCHOICE

## 2022-05-02 RX ORDER — TIZANIDINE 4 MG/1
4 TABLET ORAL 2 TIMES DAILY PRN
Qty: 60 TABLET | Refills: 0 | Status: SHIPPED | OUTPATIENT
Start: 2022-05-02 | End: 2022-06-14

## 2022-05-02 NOTE — PROGRESS NOTES
Tom Harper  YOB: 1975    Date of Service:  5/2/2022    Chief Complaint:      Chief Complaint   Patient presents with    Neck Pain     left side x 2 weeks     Shoulder Pain     left side x 2 weeks     Elbow Pain     left side x 2 weeks        Assessment/Plan:  Sandra Gaona was seen today for neck pain, shoulder pain and elbow pain. Diagnoses and all orders for this visit:    Left lateral epicondylitis  -     meloxicam (MOBIC) 15 MG tablet; Take 1 tablet by mouth daily  -     tiZANidine (ZANAFLEX) 4 MG tablet; Take 1 tablet by mouth 2 times daily as needed (neck and shoulder pain)    Acute strain of neck muscle, initial encounter  -     meloxicam (MOBIC) 15 MG tablet; Take 1 tablet by mouth daily  -     tiZANidine (ZANAFLEX) 4 MG tablet; Take 1 tablet by mouth 2 times daily as needed (neck and shoulder pain)      Return if symptoms worsen or fail to improve. HPI:  Tom Harper is a 48 y.o. He complain of left neck, shoulder pain and elbow pain for 2 weeks. He's been taking over the counter non steroid antiinflammatory without relief. No injuries and can't think of any repetitive movements he would have done to cause this. Pain is 6/10 currently. Difficulty sleeping due to waking up with pain when naproxen wears off.     Lab Results   Component Value Date    LABA1C 8.0 04/07/2022    LABA1C 7.2 01/31/2022    LABA1C 6.6 07/01/2021    LABMICR 8.60 (H) 02/01/2021     Lab Results   Component Value Date     01/31/2022    K 4.5 01/31/2022     01/31/2022    CO2 26 01/31/2022    BUN 10 01/31/2022    CREATININE 0.9 01/31/2022    GLUCOSE 116 (H) 01/31/2022    CALCIUM 9.6 01/31/2022     Lab Results   Component Value Date    CHOL 145 01/31/2022    TRIG 98 01/31/2022    HDL 64 01/31/2022    HDL 43 05/23/2012    LDLCALC 61 01/31/2022    LDLDIRECT 76 03/13/2013     Lab Results   Component Value Date    ALT 25 01/31/2022    AST 16 01/31/2022     Lab Results   Component Value Date TSH 2.02 08/24/2016    TSH 0.93 06/12/2015     Lab Results   Component Value Date    WBC 5.1 01/31/2022    HGB 13.6 01/31/2022    HCT 41.8 01/31/2022    MCV 88.2 01/31/2022     01/31/2022     Lab Results   Component Value Date    INR 0.84 (L) 03/13/2013      No results found for: PSA   No results found for: [facility]     Patient Active Problem List   Diagnosis    Hyperlipidemia with target LDL less than 100    Essential hypertension    Type 2 diabetes mellitus without complication, without long-term current use of insulin (HCC)    MICHAEL (obstructive sleep apnea)    S/P laparoscopic sleeve gastrectomy    Morbid obesity with BMI of 40.0-44.9, adult ([facility] [address]    Asthma due to seasonal allergies    Erectile dysfunction associated with type 2 diabetes mellitus ([facility] [address]    Perforation of left tympanic membrane    Primary insomnia    Anxiety and depression       Allergies   Allergen Reactions    Actos [Pioglitazone] Swelling     Diffuse weight gain     Outpatient Medications Marked as Taking for the 5/2/22 encounter (Office Visit) with Daisha Franco MD   Medication Sig Dispense Refill    glimepiride (AMARYL) 2 MG tablet TAKE 1 TABLET BY MOUTH 2 TIMES A  tablet 0    lisinopril (PRINIVIL;ZESTRIL) 40 MG tablet Take 1 tablet by mouth daily 90 tablet 3    blood glucose test strips (AGAMATRIX JAZZ TEST) strip Use to test blood sugar twice daily 200 each 3    Blood Glucose Monitoring Suppl (AGAMATRIX JAZZ WIRELESS 2) w/Device KIT Use to test blood sugar twice daily 1 kit 0    Lancets 28G MISC Use to test blood sugar twice daily (any lancet ok, pt getting agamatrix jazz) 200 each 3    rosuvastatin (CRESTOR) 20 MG tablet Take 1 tablet by mouth nightly 90 tablet 3    buPROPion (WELLBUTRIN XL) 300 MG extended release tablet Take 1 tablet by mouth every morning 90 tablet 3    montelukast (SINGULAIR) 10 MG tablet TAKE 1 TABLET BY MOUTH ONE TIME A DAY 90 tablet 3    Empagliflozin-Linaglip-Metform (TRIJARDY XR) 12.5-2.5-1000 MG TB24 Take 1 tablet by mouth 2 times daily 180 tablet 3    escitalopram (LEXAPRO) 20 MG tablet TAKE 1 TABLET BY MOUTH ONE TIME A DAY 90 tablet 3    rizatriptan (MAXALT) 10 MG tablet Take 1 tablet by mouth daily as needed for Migraine (may repeat in 2 hrs x2, maximum of 3 pills within 24 hr) May repeat in 2 hours if needed 30 tablet 0    GLUCOSAMINE HCL PO Take 2 tablets by mouth daily      blood glucose test strips (AGAMATRIX JAZZ TEST) strip 1 each by In Vitro route daily Use to test blood sugar daily. Please dispense 1 box of 100. 100 each 3    Blood Glucose Monitoring Suppl (AGAMATRIX JAZZ WIRELESS 2) w/Device KIT 1 kit by Does not apply route See Admin Instructions 1 kit 0    Blood Glucose Calibration (AGAMATRIX CONTROL) Normal SOLN 1 Bottle by In Vitro route once for 1 dose 1 each 0    AgaMatrix Ultra-Thin Lancets MISC 1 box by Does not apply route daily Use to test blood sugar once daily. 100 each 3    aspirin 325 MG EC tablet Take 1 tablet by mouth daily 30 tablet 0    Multiple Vitamins-Minerals (THERAPEUTIC MULTIVITAMIN-MINERALS) tablet Take 1 tablet by mouth daily      sildenafil (REVATIO) 20 MG tablet Take 1 tablet by mouth daily as needed (ED) 50 tablet 3    Cholecalciferol (VITAMIN D3) 2000 UNITS CAPS Take 2,000 Units by mouth daily       Blood Pressure Monitoring (B-D ASSURE BPM/AUTO ARM CUFF) MISC 1 Device by Does not apply route daily. 1 each 0         Review of Systems: 14 systems were negative except of what was stated on HPI    Nursing note and vitals reviewed. Vitals:    05/02/22 1001   BP: 130/66   Pulse: 80   SpO2: 97%   Weight: (!) 322 lb (146.1 kg)   Height: 5' 10\" (1.778 m)     Wt Readings from Last 3 Encounters:   05/02/22 (!) 322 lb (146.1 kg)   01/31/22 (!) 312 lb (141.5 kg)   05/05/21 (!) 325 lb (147.4 kg)     BP Readings from Last 3 Encounters:   05/02/22 130/66   01/31/22 136/82   05/05/21 132/70     Body mass index is 46.2 kg/m².   Constitutional: Patient appears well-developed and well-nourished. No distress. Head: Normocephalic and atraumatic. Neck: Normal range of motion. Neck supple. No thyroidmegaly. Cardiovascular: Normal rate, regular rhythm, normal heart sounds and intact distal pulses. Pulmonary/Chest: Effort normal and breath sounds normal. No stridor. No respiratory distress. No wheezes and no rales. Abdominal: Soft. Bowel sounds are normal. No distension and no mass. No tenderness. No rebound and no guarding. Musculoskeletal: No edema and no tenderness. Skin: No rash or erythema. Left lateral epicondyle pain with wrist resistance. Left neck and shoulder with FROM. Pain along mid lower neck on palpation.

## 2022-06-04 DIAGNOSIS — S16.1XXA ACUTE STRAIN OF NECK MUSCLE, INITIAL ENCOUNTER: ICD-10-CM

## 2022-06-04 DIAGNOSIS — M77.12 LEFT LATERAL EPICONDYLITIS: ICD-10-CM

## 2022-06-06 RX ORDER — MELOXICAM 15 MG/1
15 TABLET ORAL DAILY
Qty: 30 TABLET | Refills: 0 | OUTPATIENT
Start: 2022-06-06

## 2022-06-06 NOTE — TELEPHONE ENCOUNTER
Patient states that he does not need a refill at this time. He states that his elbow is doing better.

## 2022-06-07 DIAGNOSIS — R51.9 HEADACHE DISORDER: ICD-10-CM

## 2022-06-08 RX ORDER — RIZATRIPTAN BENZOATE 10 MG/1
TABLET ORAL
Qty: 30 TABLET | Refills: 1 | Status: SHIPPED | OUTPATIENT
Start: 2022-06-08

## 2022-06-09 DIAGNOSIS — E11.9 TYPE 2 DIABETES MELLITUS WITHOUT COMPLICATION, WITHOUT LONG-TERM CURRENT USE OF INSULIN (HCC): Primary | ICD-10-CM

## 2022-06-09 RX ORDER — GLIMEPIRIDE 4 MG/1
4 TABLET ORAL 2 TIMES DAILY
Qty: 180 TABLET | Refills: 0 | Status: SHIPPED | OUTPATIENT
Start: 2022-06-09 | End: 2022-06-14 | Stop reason: SDUPTHER

## 2022-06-09 NOTE — TELEPHONE ENCOUNTER
LAST REFILL 4-5-2022  AMOUNT 180   0 REFILLS  LAST VISIT  Acute-VV 5-2-22,  4-  NEXT VISIT  6-14-22    Patient requesting a 30 day supply of glimepiride 2mg. He states he is currently taking 2 pills 2 times a day and is currently out of meds. [place] and does not want to be out. He know he is to come in for the HgA1c before he was given a refill, that's why he is only wanting 30 day supply (#120) sent to [facility] (in chart) until his f/up.

## 2022-06-13 ENCOUNTER — NURSE ONLY (OUTPATIENT)
Dept: INTERNAL MEDICINE CLINIC | Age: 47
End: 2022-06-13
Payer: COMMERCIAL

## 2022-06-13 DIAGNOSIS — E11.9 TYPE 2 DIABETES MELLITUS WITHOUT COMPLICATION, WITHOUT LONG-TERM CURRENT USE OF INSULIN (HCC): ICD-10-CM

## 2022-06-13 LAB — HBA1C MFR BLD: 6.8 %

## 2022-06-13 PROCEDURE — 83036 HEMOGLOBIN GLYCOSYLATED A1C: CPT | Performed by: INTERNAL MEDICINE

## 2022-06-13 SDOH — ECONOMIC STABILITY: FOOD INSECURITY: WITHIN THE PAST 12 MONTHS, THE FOOD YOU BOUGHT JUST DIDN'T LAST AND YOU DIDN'T HAVE MONEY TO GET MORE.: NEVER TRUE

## 2022-06-13 SDOH — ECONOMIC STABILITY: FOOD INSECURITY: WITHIN THE PAST 12 MONTHS, YOU WORRIED THAT YOUR FOOD WOULD RUN OUT BEFORE YOU GOT MONEY TO BUY MORE.: NEVER TRUE

## 2022-06-13 ASSESSMENT — SOCIAL DETERMINANTS OF HEALTH (SDOH): HOW HARD IS IT FOR YOU TO PAY FOR THE VERY BASICS LIKE FOOD, HOUSING, MEDICAL CARE, AND HEATING?: NOT HARD AT ALL

## 2022-06-14 ENCOUNTER — TELEMEDICINE (OUTPATIENT)
Dept: INTERNAL MEDICINE CLINIC | Age: 47
End: 2022-06-14
Payer: COMMERCIAL

## 2022-06-14 DIAGNOSIS — I10 ESSENTIAL HYPERTENSION: ICD-10-CM

## 2022-06-14 DIAGNOSIS — E11.9 TYPE 2 DIABETES MELLITUS WITHOUT COMPLICATION, WITHOUT LONG-TERM CURRENT USE OF INSULIN (HCC): Primary | ICD-10-CM

## 2022-06-14 DIAGNOSIS — F32.A ANXIETY AND DEPRESSION: ICD-10-CM

## 2022-06-14 DIAGNOSIS — F51.01 PRIMARY INSOMNIA: ICD-10-CM

## 2022-06-14 DIAGNOSIS — E78.5 HYPERLIPIDEMIA WITH TARGET LDL LESS THAN 100: ICD-10-CM

## 2022-06-14 DIAGNOSIS — E66.01 MORBID OBESITY WITH BMI OF 40.0-44.9, ADULT (HCC): ICD-10-CM

## 2022-06-14 DIAGNOSIS — F41.9 ANXIETY AND DEPRESSION: ICD-10-CM

## 2022-06-14 PROCEDURE — 3044F HG A1C LEVEL LT 7.0%: CPT | Performed by: INTERNAL MEDICINE

## 2022-06-14 PROCEDURE — 99214 OFFICE O/P EST MOD 30 MIN: CPT | Performed by: INTERNAL MEDICINE

## 2022-06-14 RX ORDER — GLIMEPIRIDE 4 MG/1
4 TABLET ORAL 2 TIMES DAILY
Qty: 180 TABLET | Refills: 1 | Status: SHIPPED | OUTPATIENT
Start: 2022-09-01 | End: 2022-10-24 | Stop reason: SDUPTHER

## 2022-06-14 NOTE — PROGRESS NOTES
Pursuant to the emergency declaration under the [facility], [facility] waiver authority and the [employer] Act, this Virtual  Video Visit was conducted, with patient's consent, to reduce the patient's risk of exposure to COVID-19 and provide continuity of care. Service is  provided through a video synchronous discussion virtually to substitute for in-person clinic visit with the patient being at home and Dr. Jimena Scales being at home. Patient consent to the video visit. Date of Service:  6/14/2022    Chief Complaint:      Chief Complaint   Patient presents with    Diabetes     follow up and med check       Assessment/Plan:    Ricardo Oliva was seen today for diabetes. Diagnoses and all orders for this visit:    Type 2 diabetes mellitus without complication, without long-term current use of insulin (HCC)  -     glimepiride (AMARYL) 4 MG tablet; Take 1 tablet by mouth in the morning and at bedtime    Anxiety and depression    Essential hypertension    Hyperlipidemia with target LDL less than 100    Primary insomnia    Morbid obesity with BMI of 40.0-44.9, adult (HCC)    Stable and continue on current medications. Return Jan 4 at 8:10 Fasting Physical.      HPI:  Sean Stevens is a 48 y.o. Treatment Adherence:   Medication compliance: compliant all of the time   Diet compliance: compliant most of the time   Weight trend: decreasing   Current exercise: walks 3 time(s) per week   Barriers: time constraints   Diabetes Mellitus Type 2: Current symptoms/problems include: none.  Trijardy XR 12.5-2. mg bid and Amaryl 4 mg bid.  He has lost weight since getting off Actos. Home blood sugar records: fasting range: 110 AM and PM.  No change in diet or exercise. Any episodes of hypoglycemia? no   Eye exam current (within one year): yes   Tobacco history: He reports that he quit smoking about 7 years ago.  He has never used smokeless tobacco.   Daily Aspirin? No: will start   Known diabetic complications: none   Hypertension: Home blood pressure monitoring: yes 130/70 on Lisinopril 40 mg qd. He is adherent to a low sodium diet. Patient denies chest pain, shortness of breath, headache, lightheadedness, blurred vision, peripheral edema, palpitations, dry cough and fatigue. Antihypertensive medication side effects: no medication side effects noted. Use of agents associated with hypertension: none. Hyperlipidemia: No new myalgias or GI upset on simvastatin (Zocor) 20 mg qhs.      Insomnia: stable off trazodone 150 mg prn couple of weeks  Exercise/allergies induce Asthma:  Stable on Singulair 10 mg qhs and have not need Flonase. 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 No pain or hearing loss. Obesity: He's had sleeve surgery 11/10/14. Anemia due to blood donation every 16 weeks: stable on multivitamin daily. Adjustment d/o with depression:  stable on Wellbutrin  mg qam and Lexapro 20 mg qd.  He's off Cymbalta 60 mg bid due to edema.  No SI or HI.  He has been seeing a counselor every 6-8 weeks.     Lab Results   Component Value Date    LABA1C 6.8 06/13/2022    LABA1C 8.0 04/07/2022    LABA1C 7.2 01/31/2022    LABMICR 8.60 (H) 02/01/2021     Lab Results   Component Value Date     01/31/2022    K 4.5 01/31/2022     01/31/2022    CO2 26 01/31/2022    BUN 10 01/31/2022    CREATININE 0.9 01/31/2022    GLUCOSE 116 (H) 01/31/2022    CALCIUM 9.6 01/31/2022     Lab Results   Component Value Date    CHOL 145 01/31/2022    TRIG 98 01/31/2022    HDL 64 01/31/2022    HDL 43 05/23/2012    LDLCALC 61 01/31/2022    LDLDIRECT 76 03/13/2013     Lab Results   Component Value Date    ALT 25 01/31/2022    AST 16 01/31/2022     Lab Results   Component Value Date    TSH 2.02 08/24/2016    TSH 0.93 06/12/2015     Lab Results   Component Value Date    WBC 5.1 01/31/2022    HGB 13.6 01/31/2022    HCT 41.8 01/31/2022    MCV 88.2 01/31/2022     01/31/2022     Lab Results   Component Value Date    INR 0.84 (L) 03/13/2013      No results found for: PSA   No results found for: [facility]     Patient Active Problem List   Diagnosis    Hyperlipidemia with target LDL less than 100    Essential hypertension    Type 2 diabetes mellitus without complication, without long-term current use of insulin (HCC)    MICHAEL (obstructive sleep apnea)    S/P laparoscopic sleeve gastrectomy    Morbid obesity with BMI of 40.0-44.9, adult ([facility] [address]    Asthma due to seasonal allergies    Erectile dysfunction associated with type 2 diabetes mellitus ([facility] [address]    Perforation of left tympanic membrane    Primary insomnia    Anxiety and depression       Allergies   Allergen Reactions    Actos [Pioglitazone] Swelling     Diffuse weight gain     Outpatient Medications Marked as Taking for the 6/14/22 encounter (Telemedicine) with Cecilia Franco MD   Medication Sig Dispense Refill    glimepiride (AMARYL) 4 MG tablet Take 1 tablet by mouth in the morning and at bedtime 180 tablet 0    lisinopril (PRINIVIL;ZESTRIL) 40 MG tablet Take 1 tablet by mouth daily 90 tablet 3    blood glucose test strips (AGAMATRIX JAZZ TEST) strip Use to test blood sugar twice daily 200 each 3    Blood Glucose Monitoring Suppl (AGAMATRIX JAZZ WIRELESS 2) w/Device KIT Use to test blood sugar twice daily 1 kit 0    Lancets 28G MISC Use to test blood sugar twice daily (any lancet ok, pt getting agamatrix jazz) 200 each 3    rosuvastatin (CRESTOR) 20 MG tablet Take 1 tablet by mouth nightly 90 tablet 3    buPROPion (WELLBUTRIN XL) 300 MG extended release tablet Take 1 tablet by mouth every morning 90 tablet 3    montelukast (SINGULAIR) 10 MG tablet TAKE 1 TABLET BY MOUTH ONE TIME A DAY 90 tablet 3    Empagliflozin-Linaglip-Metform (TRIJARDY XR) 12.5-2.5-1000 MG TB24 Take 1 tablet by mouth 2 times daily 180 tablet 3    escitalopram (LEXAPRO) 20 MG tablet TAKE 1 TABLET BY MOUTH ONE TIME A DAY 90 tablet 3    blood glucose test strips (AGAMATRIX JAZZ TEST) strip 1 each by In Vitro route daily Use to test blood sugar daily. Please dispense 1 box of 100. 100 each 3    Blood Glucose Monitoring Suppl (AGAMATRIX JAZZ WIRELESS 2) w/Device KIT 1 kit by Does not apply route See Admin Instructions 1 kit 0    Blood Glucose Calibration (AGAMATRIX CONTROL) Normal SOLN 1 Bottle by In Vitro route once for 1 dose 1 each 0    AgaMatrix Ultra-Thin Lancets MISC 1 box by Does not apply route daily Use to test blood sugar once daily. 100 each 3    Multiple Vitamins-Minerals (THERAPEUTIC MULTIVITAMIN-MINERALS) tablet Take 1 tablet by mouth daily      sildenafil (REVATIO) 20 MG tablet Take 1 tablet by mouth daily as needed (ED) 50 tablet 3    Cholecalciferol (VITAMIN D3) 2000 UNITS CAPS Take 2,000 Units by mouth daily       Blood Pressure Monitoring (B-D ASSURE BPM/AUTO ARM CUFF) MISC 1 Device by Does not apply route daily. 1 each 0         Review of Systems: 14 systems were negative except of what was stated on HPI    Nursing note and vitals reviewed. There were no vitals filed for this visit. Wt Readings from Last 3 Encounters:   05/02/22 (!) 322 lb (146.1 kg)   01/31/22 (!) 312 lb (141.5 kg)   05/05/21 (!) 325 lb (147.4 kg)     BP Readings from Last 3 Encounters:   05/02/22 130/66   01/31/22 136/82   05/05/21 132/70     There is no height or weight on file to calculate BMI. Constitutional: Patient appears well-developed and well-nourished. No distress. Head: Normocephalic and atraumatic. Psychiatric: Normal mood and affect.  Behavior is normal.

## 2022-11-23 DIAGNOSIS — F41.9 ANXIETY AND DEPRESSION: ICD-10-CM

## 2022-11-23 DIAGNOSIS — E11.9 TYPE 2 DIABETES MELLITUS WITHOUT COMPLICATION, WITHOUT LONG-TERM CURRENT USE OF INSULIN (HCC): ICD-10-CM

## 2022-11-23 DIAGNOSIS — F32.A ANXIETY AND DEPRESSION: ICD-10-CM

## 2022-11-23 DIAGNOSIS — I10 ESSENTIAL HYPERTENSION: ICD-10-CM

## 2022-11-23 DIAGNOSIS — E78.5 HYPERLIPIDEMIA WITH TARGET LDL LESS THAN 100: ICD-10-CM

## 2022-11-23 RX ORDER — ESCITALOPRAM OXALATE 20 MG/1
TABLET ORAL
Qty: 40 TABLET | Refills: 0 | Status: SHIPPED | OUTPATIENT
Start: 2022-11-23

## 2022-11-23 RX ORDER — LISINOPRIL 40 MG/1
40 TABLET ORAL DAILY
Qty: 40 TABLET | Refills: 0 | Status: SHIPPED | OUTPATIENT
Start: 2022-11-23

## 2022-11-23 RX ORDER — GLIMEPIRIDE 4 MG/1
4 TABLET ORAL 2 TIMES DAILY
Qty: 80 TABLET | Refills: 0 | Status: SHIPPED | OUTPATIENT
Start: 2022-11-23

## 2022-11-23 RX ORDER — ROSUVASTATIN CALCIUM 20 MG/1
20 TABLET, COATED ORAL NIGHTLY
Qty: 40 TABLET | Refills: 0 | Status: SHIPPED | OUTPATIENT
Start: 2022-11-23

## 2022-11-23 RX ORDER — BUPROPION HYDROCHLORIDE 300 MG/1
300 TABLET ORAL EVERY MORNING
Qty: 40 TABLET | Refills: 0 | Status: SHIPPED | OUTPATIENT
Start: 2022-11-23

## 2022-12-01 DIAGNOSIS — J45.909 ASTHMA DUE TO SEASONAL ALLERGIES: ICD-10-CM

## 2022-12-01 RX ORDER — MONTELUKAST SODIUM 10 MG/1
TABLET ORAL
Qty: 30 TABLET | Refills: 1 | Status: SHIPPED | OUTPATIENT
Start: 2022-12-01

## 2022-12-06 ENCOUNTER — TELEPHONE (OUTPATIENT)
Dept: PHARMACY | Facility: CLINIC | Age: 47
End: 2022-12-06

## 2022-12-06 NOTE — TELEPHONE ENCOUNTER
WALTER SILVA MD  -  patient missing yearly urine microalbumin to be compliant with requirements for this program  - also, I saw a note regarding trijardy samples for the patient. Patient can get trijardy at no cost for #90 day supply if prescription is sent [facility]    Thank you,  Nolvia Malone PharmD, [address] Pharmacist  Department: [phone]  =============================================  [facility] Employee Diabetes Program    Ashly Bucio is a 50 y.o. male enrolled in the [place] with Diabetes Program. The goal of this voluntary program is to help employees and covered dependents reach their health maintenance goals in regards to their diabetes diagnosis. According to our records, patient is missing the following requirement(s) that must be completed by December 31, 2022 to avoid discharge from the program:    Urine protein/microalbumin     Plan:  Lab not ordered, message provider. Also noticed message regarding trijardy samples.  Message provider and patient regarding patient can get at no cost through diabetic program    Nolvia Malone PharmD, [address] Pharmacist  Department: [address] [place] [address] in place:  No  Recommendation Provided To: Provider: 1 via Note to Provider  Intervention Detail: Lab(s) Ordered  Gap Closed?: No   Intervention Accepted By: Provider: 0  Time Spent (min): 15

## 2023-01-04 ENCOUNTER — OFFICE VISIT (OUTPATIENT)
Dept: INTERNAL MEDICINE CLINIC | Age: 48
End: 2023-01-04
Payer: COMMERCIAL

## 2023-01-04 VITALS
SYSTOLIC BLOOD PRESSURE: 136 MMHG | DIASTOLIC BLOOD PRESSURE: 80 MMHG | WEIGHT: 315 LBS | BODY MASS INDEX: 45.1 KG/M2 | HEART RATE: 71 BPM | OXYGEN SATURATION: 98 % | HEIGHT: 70 IN

## 2023-01-04 DIAGNOSIS — E11.9 TYPE 2 DIABETES MELLITUS WITHOUT COMPLICATION, WITHOUT LONG-TERM CURRENT USE OF INSULIN (HCC): ICD-10-CM

## 2023-01-04 DIAGNOSIS — F32.A ANXIETY AND DEPRESSION: ICD-10-CM

## 2023-01-04 DIAGNOSIS — Z00.00 ENCOUNTER FOR WELL ADULT EXAM WITHOUT ABNORMAL FINDINGS: Primary | ICD-10-CM

## 2023-01-04 DIAGNOSIS — J45.909 ASTHMA DUE TO SEASONAL ALLERGIES: ICD-10-CM

## 2023-01-04 DIAGNOSIS — Z23 NEED FOR INFLUENZA VACCINATION: ICD-10-CM

## 2023-01-04 DIAGNOSIS — F41.9 ANXIETY AND DEPRESSION: ICD-10-CM

## 2023-01-04 DIAGNOSIS — I10 ESSENTIAL HYPERTENSION: ICD-10-CM

## 2023-01-04 DIAGNOSIS — E78.5 HYPERLIPIDEMIA WITH TARGET LDL LESS THAN 100: ICD-10-CM

## 2023-01-04 LAB
BASOPHILS ABSOLUTE: 0 K/UL (ref 0–0.2)
BASOPHILS RELATIVE PERCENT: 1 %
EOSINOPHILS ABSOLUTE: 0.1 K/UL (ref 0–0.6)
EOSINOPHILS RELATIVE PERCENT: 2.6 %
HBA1C MFR BLD: 8.3 %
HCT VFR BLD CALC: 39.5 % (ref 40.5–52.5)
HEMOGLOBIN: 12.4 G/DL (ref 13.5–17.5)
LYMPHOCYTES ABSOLUTE: 1.1 K/UL (ref 1–5.1)
LYMPHOCYTES RELATIVE PERCENT: 24.1 %
MCH RBC QN AUTO: 25.6 PG (ref 26–34)
MCHC RBC AUTO-ENTMCNC: 31.5 G/DL (ref 31–36)
MCV RBC AUTO: 81.2 FL (ref 80–100)
MONOCYTES ABSOLUTE: 0.4 K/UL (ref 0–1.3)
MONOCYTES RELATIVE PERCENT: 10.1 %
NEUTROPHILS ABSOLUTE: 2.7 K/UL (ref 1.7–7.7)
NEUTROPHILS RELATIVE PERCENT: 62.2 %
PDW BLD-RTO: 16.6 % (ref 12.4–15.4)
PLATELET # BLD: 294 K/UL (ref 135–450)
PMV BLD AUTO: 7.8 FL (ref 5–10.5)
RBC # BLD: 4.87 M/UL (ref 4.2–5.9)
WBC # BLD: 4.4 K/UL (ref 4–11)

## 2023-01-04 PROCEDURE — 90471 IMMUNIZATION ADMIN: CPT | Performed by: INTERNAL MEDICINE

## 2023-01-04 PROCEDURE — G8482 FLU IMMUNIZE ORDER/ADMIN: HCPCS | Performed by: INTERNAL MEDICINE

## 2023-01-04 PROCEDURE — 99396 PREV VISIT EST AGE 40-64: CPT | Performed by: INTERNAL MEDICINE

## 2023-01-04 PROCEDURE — 90674 CCIIV4 VAC NO PRSV 0.5 ML IM: CPT | Performed by: INTERNAL MEDICINE

## 2023-01-04 PROCEDURE — 3079F DIAST BP 80-89 MM HG: CPT | Performed by: INTERNAL MEDICINE

## 2023-01-04 PROCEDURE — 3075F SYST BP GE 130 - 139MM HG: CPT | Performed by: INTERNAL MEDICINE

## 2023-01-04 PROCEDURE — 36415 COLL VENOUS BLD VENIPUNCTURE: CPT | Performed by: INTERNAL MEDICINE

## 2023-01-04 PROCEDURE — 83036 HEMOGLOBIN GLYCOSYLATED A1C: CPT | Performed by: INTERNAL MEDICINE

## 2023-01-04 RX ORDER — GLIMEPIRIDE 4 MG/1
4 TABLET ORAL 2 TIMES DAILY
Qty: 180 TABLET | Refills: 3 | Status: SHIPPED | OUTPATIENT
Start: 2023-01-04

## 2023-01-04 RX ORDER — EMPAGLIFLOZIN, LINAGLIPTIN, METFORMIN HYDROCHLORIDE 12.5; 2.5; 1 MG/1; MG/1; MG/1
1 TABLET, EXTENDED RELEASE ORAL 2 TIMES DAILY
Qty: 180 TABLET | Refills: 0 | Status: SHIPPED | OUTPATIENT
Start: 2023-01-04

## 2023-01-04 RX ORDER — ESCITALOPRAM OXALATE 20 MG/1
TABLET ORAL
Qty: 90 TABLET | Refills: 3 | Status: SHIPPED | OUTPATIENT
Start: 2023-01-04

## 2023-01-04 RX ORDER — MONTELUKAST SODIUM 10 MG/1
TABLET ORAL
Qty: 90 TABLET | Refills: 3 | Status: SHIPPED | OUTPATIENT
Start: 2023-01-04

## 2023-01-04 RX ORDER — LISINOPRIL 40 MG/1
40 TABLET ORAL DAILY
Qty: 90 TABLET | Refills: 3 | Status: SHIPPED | OUTPATIENT
Start: 2023-01-04

## 2023-01-04 RX ORDER — BUPROPION HYDROCHLORIDE 300 MG/1
300 TABLET ORAL EVERY MORNING
Qty: 90 TABLET | Refills: 3 | Status: SHIPPED | OUTPATIENT
Start: 2023-01-04

## 2023-01-04 RX ORDER — ROSUVASTATIN CALCIUM 20 MG/1
20 TABLET, COATED ORAL NIGHTLY
Qty: 90 TABLET | Refills: 3 | Status: SHIPPED | OUTPATIENT
Start: 2023-01-04

## 2023-01-04 ASSESSMENT — PATIENT HEALTH QUESTIONNAIRE - PHQ9
SUM OF ALL RESPONSES TO PHQ9 QUESTIONS 1 & 2: 0
10. IF YOU CHECKED OFF ANY PROBLEMS, HOW DIFFICULT HAVE THESE PROBLEMS MADE IT FOR YOU TO DO YOUR WORK, TAKE CARE OF THINGS AT HOME, OR GET ALONG WITH OTHER PEOPLE: 0
SUM OF ALL RESPONSES TO PHQ QUESTIONS 1-9: 1
1. LITTLE INTEREST OR PLEASURE IN DOING THINGS: 0
6. FEELING BAD ABOUT YOURSELF - OR THAT YOU ARE A FAILURE OR HAVE LET YOURSELF OR YOUR FAMILY DOWN: 0
7. TROUBLE CONCENTRATING ON THINGS, SUCH AS READING THE NEWSPAPER OR WATCHING TELEVISION: 0
2. FEELING DOWN, DEPRESSED OR HOPELESS: 0
8. MOVING OR SPEAKING SO SLOWLY THAT OTHER PEOPLE COULD HAVE NOTICED. OR THE OPPOSITE, BEING SO FIGETY OR RESTLESS THAT YOU HAVE BEEN MOVING AROUND A LOT MORE THAN USUAL: 0
SUM OF ALL RESPONSES TO PHQ QUESTIONS 1-9: 1
9. THOUGHTS THAT YOU WOULD BE BETTER OFF DEAD, OR OF HURTING YOURSELF: 0
3. TROUBLE FALLING OR STAYING ASLEEP: 1
4. FEELING TIRED OR HAVING LITTLE ENERGY: 0
5. POOR APPETITE OR OVEREATING: 0
SUM OF ALL RESPONSES TO PHQ QUESTIONS 1-9: 1
SUM OF ALL RESPONSES TO PHQ QUESTIONS 1-9: 1

## 2023-01-04 NOTE — PROGRESS NOTES
[facility]  History and Physical  Rdaha Richardson M.D. Bebe Gonzalez  YOB: 1975    Date of Service:  1/4/2023    Chief Complaint:   Bebe Gonzalez is a 50 y.o. male who presents for   Chief Complaint   Patient presents with    Annual Exam       Assessment/Plan:    Pat Ray was seen today for annual exam.    Diagnoses and all orders for this visit:    Encounter for well adult exam without abnormal findings  -     Lipid Panel  -     Comprehensive Metabolic Panel  -     CBC with Auto Differential    Need for influenza vaccination  -     Influenza, FLUCELVAX, (age 10 mo+), IM, Preservative Free, 0.5 mL    Type 2 diabetes mellitus without complication, without long-term current use of insulin ([facility])  -     POCT glycosylated hemoglobin (Hb A1C)  -     Empagliflozin-Linaglip-Metform (TRIJARDY XR) 12.5-2.5-1000 MG TB24; Take 1 tablet by mouth 2 times daily  -     glimepiride (AMARYL) 4 MG tablet; Take 1 tablet by mouth in the morning and at bedtime  -     Diabetic Foot Exam  -     Microalbumin / Creatinine Urine Ratio  -     POCT glycosylated hemoglobin (Hb A1C); Future    Asthma due to seasonal allergies  -     montelukast (SINGULAIR) 10 MG tablet; TAKE 1 TABLET BY MOUTH ONE TIME A DAY    Anxiety and depression  -     escitalopram (LEXAPRO) 20 MG tablet; TAKE 1 TABLET BY MOUTH ONE TIME A DAY  -     buPROPion (WELLBUTRIN XL) 300 MG extended release tablet; Take 1 tablet by mouth every morning    Essential hypertension  -     lisinopril (PRINIVIL;ZESTRIL) 40 MG tablet; Take 1 tablet by mouth daily    Hyperlipidemia with target LDL less than 100  -     rosuvastatin (CRESTOR) 20 MG tablet; Take 1 tablet by mouth nightly      Return VV diabetes 3/28 and Diabetes, mood, cholesterol, bp. ...6/22. HPI: Here for Annual Physical and Follow up.   Treatment Adherence:   Medication compliance: compliant all of the time   Diet compliance: compliant most of the time   Weight trend: decreasing Current exercise: walks 3 time(s) per week   Barriers: time constraints   Diabetes Mellitus Type 2: Current symptoms/problems include: none. he was out of Trijardy XR 12.5-2. mg bid intermittentlyl due to insurance change and Amaryl 4 mg bid. He has lost weight since getting off Actos. Home blood sugar records: fasting range: 110 AM and PM.  No change in diet or exercise. Any episodes of hypoglycemia? no   Eye exam current (within one year): yes   Tobacco history: He reports that he quit smoking about 7 years ago. He has never used smokeless tobacco.   Daily Aspirin? No: will start   Known diabetic complications: none   Hypertension: Home blood pressure monitoring: yes 130/70 on Lisinopril 40 mg qd. He is adherent to a low sodium diet. Patient denies chest pain, shortness of breath, headache, lightheadedness, blurred vision, peripheral edema, palpitations, dry cough and fatigue. Antihypertensive medication side effects: no medication side effects noted. Use of agents associated with hypertension: none. Hyperlipidemia: No new myalgias or GI upset on simvastatin (Zocor) 20 mg qhs. Insomnia: stable off trazodone 150 mg prn couple of weeks  Exercise/allergies induce Asthma:  Stable on Singulair 10 mg qhs and have not need Flonase. ED associated with DM: stable on Revatio 20 mg 1 prn  Chronic left ear drainage intermittently since he had perforated ear drum 3-4 years ago. No pain or hearing loss. Obesity: He's had sleeve surgery 11/10/14. 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 Adjustment d/o with depression:  stable on Wellbutrin  mg qam and Lexapro 20 mg qd. He's off Cymbalta 60 mg bid due to edema. No SI or HI. He has been seeing a counselor every 6-8 weeks.   Lab Results   Component Value Date    LABA1C 8.3 01/04/2023    LABA1C 6.8 06/13/2022    LABA1C 8.0 04/07/2022    LABMICR 8.60 (H) 02/01/2021     Lab Results   Component Value Date     01/31/2022    K 4.5 01/31/2022     01/31/2022    CO2 26 01/31/2022    BUN 10 01/31/2022    CREATININE 0.9 01/31/2022    GLUCOSE 116 (H) 01/31/2022    CALCIUM 9.6 01/31/2022     Lab Results   Component Value Date/Time    CHOL 145 01/31/2022 08:42 AM    TRIG 98 01/31/2022 08:42 AM    HDL 64 01/31/2022 08:42 AM    HDL 43 05/23/2012 12:33 AM    LDLCALC 61 01/31/2022 08:42 AM    LDLDIRECT 76 03/13/2013 06:55 AM     Lab Results   Component Value Date    ALT 25 01/31/2022    AST 16 01/31/2022     Lab Results   Component Value Date    TSH 2.02 08/24/2016    TSH 0.93 06/12/2015     Lab Results   Component Value Date    WBC 5.1 01/31/2022    HGB 13.6 01/31/2022    HCT 41.8 01/31/2022    MCV 88.2 01/31/2022     01/31/2022     Lab Results   Component Value Date    INR 0.84 (L) 03/13/2013      No results found for: PSA   No results found for: LABURIC     Wt Readings from Last 3 Encounters:   01/04/23 (!) 318 lb (144.2 kg)   05/02/22 (!) 322 lb (146.1 kg)   01/31/22 (!) 312 lb (141.5 kg)     BP Readings from Last 3 Encounters:   01/04/23 136/80   05/02/22 130/66   01/31/22 136/82       Patient Active Problem List   Diagnosis    Hyperlipidemia with target LDL less than 100    Essential hypertension    Type 2 diabetes mellitus without complication, without long-term current use of insulin (HCC)    MICHAEL (obstructive sleep apnea)    S/P laparoscopic sleeve gastrectomy    Morbid obesity with BMI of 40.0-44.9, adult ([address]    Asthma due to seasonal allergies    Erectile dysfunction associated with type 2 diabetes mellitus ([facility] [address]    Perforation of left tympanic membrane    Primary insomnia    Anxiety and depression       Allergies   Allergen Reactions    Actos [Pioglitazone] Swelling     Diffuse weight gain     Outpatient Medications Marked as Taking for the 1/4/23 encounter (Office Visit) with Eugenio Franco MD   Medication Sig Dispense Refill    Empagliflozin-Linaglip-Metform (TRIJARDY XR) 12.5-2.5-1000 MG TB24 Take 1 tablet by mouth 2 times daily 180 tablet 0    glimepiride (AMARYL) 4 MG tablet Take 1 tablet by mouth in the morning and at bedtime 180 tablet 3    montelukast (SINGULAIR) 10 MG tablet TAKE 1 TABLET BY MOUTH ONE TIME A DAY 90 tablet 3    escitalopram (LEXAPRO) 20 MG tablet TAKE 1 TABLET BY MOUTH ONE TIME A DAY 90 tablet 3    lisinopril (PRINIVIL;ZESTRIL) 40 MG tablet Take 1 tablet by mouth daily 90 tablet 3    rosuvastatin (CRESTOR) 20 MG tablet Take 1 tablet by mouth nightly 90 tablet 3    buPROPion (WELLBUTRIN XL) 300 MG extended release tablet Take 1 tablet by mouth every morning 90 tablet 3    rizatriptan (MAXALT) 10 MG tablet TAKE 1 TABLET BY MOUTH DAILY AS NEEDED FOR MIGRAINE. MAY REPEAT IN 2 HOURS, MAXIMUM OF 3 TABS WITHIN 24HOURS. REPEAT IN 2 HOURS IF NEEDED 30 tablet 1    blood glucose test strips (AGAMATRIX JAZZ TEST) strip Use to test blood sugar twice daily 200 each 3    Blood Glucose Monitoring Suppl (AGAMATRIX JAZZ WIRELESS 2) w/Device KIT Use to test blood sugar twice daily 1 kit 0    Lancets 28G MISC Use to test blood sugar twice daily (any lancet ok, pt getting agamatrix jazz) 200 each 3    GLUCOSAMINE HCL PO Take 2 tablets by mouth daily      blood glucose test strips (AGAMATRIX JAZZ TEST) strip 1 each by In Vitro route daily Use to test blood sugar daily. Please dispense 1 box of 100. 100 each 3    Blood Glucose Monitoring Suppl (AGAMATRIX JAZZ WIRELESS 2) w/Device KIT 1 kit by Does not apply route See Admin Instructions 1 kit 0    Blood Glucose Calibration (AGAMATRIX CONTROL) Normal SOLN 1 Bottle by In Vitro route once for 1 dose 1 each 0    AgaMatrix Ultra-Thin Lancets MISC 1 box by Does not apply route daily Use to test blood sugar once daily.  100 each 3    Multiple Vitamins-Minerals (THERAPEUTIC MULTIVITAMIN-MINERALS) tablet Take 1 tablet by mouth daily      sildenafil (REVATIO) 20 MG tablet Take 1 tablet by mouth daily as needed (ED) 50 tablet 3    Cholecalciferol (VITAMIN D3) 2000 UNITS CAPS Take 2,000 Units by mouth daily       Blood Pressure Monitoring (B-D ASSURE BPM/AUTO ARM CUFF) MISC 1 Device by Does not apply route daily.  1 each 0       Past Medical History:   Diagnosis Date    Anxiety and depression     Asthma due to seasonal allergies 10/6/2015    Bruised rib 08/2019    Dental bridge present     Dysthymia 10/8/2019    Gout 2010    Hyperlipidemia     Hypertension     Morbid obesity with BMI of 40.0-44.9, adult ([facility] [address]     Obesity (BMI 30-39.9) 2/12/2015    Type II or unspecified type diabetes mellitus without mention of complication, not stated as uncontrolled     Unspecified sleep apnea     uses cpap     Past Surgical History:   Procedure Laterality Date    CYST REMOVAL      DIAGNOSTIC CARDIAC CATH LAB PROCEDURE      KNEE ARTHROSCOPY Right 11/25/2019    RIGHT KNEE ARTHROSCOPY, PARTIAL MEDIAL MENISCECTOMY performed by Doug Sánchez MD at [address] [place] [address]  11/10/2014    LAPAROSCOPIC    TYMPANOPLASTY N/A 8/26/2019    LEFT MYRINGOPLASTY WITH PAPER PATCH performed by Elieser Britton DO at [facility] History   Problem Relation Age of Onset    High Blood Pressure Mother     High Cholesterol Mother     Diabetes Mother     Arthritis Mother     Heart Disease Father     High Blood Pressure Father     High Cholesterol Father     High Blood Pressure Sister     High Cholesterol Sister     Alcohol Abuse Paternal Uncle     COPD Paternal Uncle     Alcohol Abuse Maternal Cousin     Alcohol Abuse Paternal Cousin     Cancer Maternal Grandmother 45        Uterine    Cancer Paternal Grandfather         Throat    Alcohol Abuse Paternal Uncle     COPD Paternal Uncle     Alcohol Abuse Paternal Uncle      Social History     Socioeconomic History    Marital status:      Spouse name: Not on file    Number of children: Not on file    Years of education: Not on file    Highest education level: Not on file   Occupational History    Occupation: behavorial health     Employer: [employer]   Tobacco Use    Smoking status: Former     Packs/day: 1.50     Years: 20.00     Pack years: 30.00     Types: Cigarettes     Quit date: 2007     Years since quittin.0    Smokeless tobacco: Never   Vaping Use    Vaping Use: Never used   Substance and Sexual Activity    Alcohol use: Yes     Comment: socially     Drug use: No    Sexual activity: Yes     Partners: Female   Other Topics Concern    Not on file   Social History Narrative    Not on file     Social Determinants of Health     Financial Resource Strain: Low Risk     Difficulty of Paying Living Expenses: Not hard at all   Food Insecurity: No Food Insecurity    Worried About Running Out of Food in the Last Year: Never true    Ran Out of Food in the Last Year: Never true   Transportation Needs: Not on file   Physical Activity: Not on file   Stress: Not on file   Social Connections: Not on file   Intimate Partner Violence: Not on file   Housing Stability: Not on file       Review of Systems:  A comprehensive review of systems was negative except for what was noted in the HPI. Physical Exam:   Vitals:    23 0754   BP: 136/80   Pulse: 71   SpO2: 98%   Weight: (!) 318 lb (144.2 kg)   Height: 5' 10\" (1.778 m)     Body mass index is 45.63 kg/m². Constitutional: He is oriented to person, place, and time. He appears well-developed and well-nourished. No distress. HEENT:   Head: Normocephalic and atraumatic. Right Ear: Tympanic membrane, external ear and ear canal normal.   Left Ear: Tympanic membrane, external ear and ear canal normal.   Mouth/Throat: Oropharynx is clear and moist and mucous membranes are normal. No oropharyngeal exudate or posterior oropharyngeal erythema. He has no cervical adenopathy. Eyes: Conjunctivae and extraocular motions are normal. Pupils are equal, round, and reactive to light. Neck:  Supple. No JVD present. Carotid bruit is not present. No mass and no thyromegaly present.    Cardiovascular: Normal rate, regular rhythm, normal heart sounds and intact distal pulses. Pulmonary/Chest: Effort normal and breath sounds normal. No respiratory distress. He has no wheezes, rhonchi or rales. Abdominal: Soft, non-tender. Bowel sounds and aorta are normal. There is no organomegaly, mass or bruit. Musculoskeletal: Normal range of motion. He exhibits no edema. Neurological: He is alert and oriented to person, place, and time. He has normal reflexes. No cranial nerve deficit. Coordination normal.   Skin: Skin is warm, dry and intact. No suspicious lesions are noted.     Preventive Care:  Health Maintenance   Topic Date Due    COVID-19 Vaccine (4 - Booster for Moderna series) 12/24/2021    Diabetic Alb to Cr ratio (uACR) test  02/01/2022    Flu vaccine (1) 08/01/2022    Lipids  01/31/2023    GFR test (Diabetes, CKD 3-4, OR last GFR 15-59)  01/31/2023    Diabetic foot exam  01/04/2024    A1C test (Diabetic or Prediabetic)  01/04/2024    Depression Monitoring  01/04/2024    Diabetic retinal exam  09/27/2024    Colorectal Cancer Screen  02/27/2025    DTaP/Tdap/Td vaccine (3 - Td or Tdap) 10/27/2030    Pneumococcal 0-64 years Vaccine (3 - PPSV23 if available, else PCV20) 11/20/2038    Hepatitis B vaccine  Completed    HIV screen  Addressed    Hepatitis A vaccine  Aged Out    Hib vaccine  Aged Out    Meningococcal (ACWY) vaccine  Aged Out    Hepatitis C screen  Discontinued        Recommendations for Preventive Services Due: see orders and patient instructions/AVS.

## 2023-01-04 NOTE — PATIENT INSTRUCTIONS
Well Visit, Ages 25 to 72: Care Instructions  Well visits can help you stay healthy. Your doctor has checked your overall health and may have suggested ways to take good care of yourself. Your doctor also may have recommended tests. You can help prevent illness with healthy eating, good sleep, vaccinations, regular exercise, and other steps. Get the tests that you and your doctor decide on. Depending on your age and risks, examples might include screening for diabetes; hepatitis C; HIV; and cervical, breast, lung, and colon cancer. Screening helps find diseases before any symptoms appear. Eat healthy foods. Choose fruits, vegetables, whole grains, lean protein, and low-fat dairy foods. Limit saturated fat and reduce salt. Limit alcohol. Men should have no more than 2 drinks a day. Women should have no more than 1. For some people, no alcohol is the best choice. Exercise. Get at least 30 minutes of exercise on most days of the week. Walking can be a good choice. Reach and stay at your healthy weight. This will lower your risk for many health problems. Take care of your mental health. Try to stay connected with friends, family, and community, and find ways to manage stress. If you're feeling depressed or hopeless, talk to someone. A counselor can help. If you don't have a counselor, talk to your doctor. Talk to your doctor if you think you may have a problem with alcohol or drug use. This includes prescription medicines and illegal drugs. Avoid tobacco and nicotine: Don't smoke, vape, or chew. If you need help quitting, talk to your doctor. Practice safer sex. Getting tested, using condoms or dental dams, and limiting sex partners can help prevent STIs. Use birth control if it's important to you to prevent pregnancy. Talk with your doctor about your choices and what might be best for you. Prevent problems where you can.  Protect your skin from too much sun, wash your hands, brush your teeth twice a day, and wear a seat belt in the car. Where can you learn more? Go to http://www.leyva.com/ and enter P072 to learn more about \"Well Visit, Ages 25 to 72: Care Instructions. \"  Current as of: March 9, 2022               Content Version: 13.5  © 1019-8586 Healthwise, Incorporated. Care instructions adapted under license by [facility] [address] If you have questions about a medical condition or this instruction, always ask your healthcare professional. [place] [address] any warranty or liability for your use of this information.

## 2023-01-05 LAB
A/G RATIO: 1.7 (ref 1.1–2.2)
ALBUMIN SERPL-MCNC: 4.6 G/DL (ref 3.4–5)
ALP BLD-CCNC: 84 U/L (ref 40–129)
ALT SERPL-CCNC: 24 U/L (ref 10–40)
ANION GAP SERPL CALCULATED.3IONS-SCNC: 14 MMOL/L (ref 3–16)
AST SERPL-CCNC: 18 U/L (ref 15–37)
BILIRUB SERPL-MCNC: 0.5 MG/DL (ref 0–1)
BUN BLDV-MCNC: 15 MG/DL (ref 7–20)
CALCIUM SERPL-MCNC: 9.6 MG/DL (ref 8.3–10.6)
CHLORIDE BLD-SCNC: 98 MMOL/L (ref 99–110)
CHOLESTEROL, TOTAL: 158 MG/DL (ref 0–199)
CO2: 24 MMOL/L (ref 21–32)
CREAT SERPL-MCNC: 0.9 MG/DL (ref 0.9–1.3)
CREATININE URINE: 62.1 MG/DL (ref 39–259)
GFR SERPL CREATININE-BSD FRML MDRD: >60 ML/MIN/{1.73_M2}
GLUCOSE BLD-MCNC: 226 MG/DL (ref 70–99)
HDLC SERPL-MCNC: 50 MG/DL (ref 40–60)
LDL CHOLESTEROL CALCULATED: 75 MG/DL
MICROALBUMIN UR-MCNC: 3.5 MG/DL
MICROALBUMIN/CREAT UR-RTO: 56.4 MG/G (ref 0–30)
POTASSIUM SERPL-SCNC: 4.8 MMOL/L (ref 3.5–5.1)
SODIUM BLD-SCNC: 136 MMOL/L (ref 136–145)
TOTAL PROTEIN: 7.3 G/DL (ref 6.4–8.2)
TRIGL SERPL-MCNC: 165 MG/DL (ref 0–150)
VLDLC SERPL CALC-MCNC: 33 MG/DL

## 2023-01-26 ENCOUNTER — CLINICAL DOCUMENTATION (OUTPATIENT)
Dept: PHARMACY | Facility: CLINIC | Age: 48
End: 2023-01-26

## 2023-01-26 NOTE — PROGRESS NOTES
Pharmacy Pop Care Documentation:     Naz Liu is being removed from the diabetes management program for the following reason(s):  per [facility] HR benefits ended 9/30/22    [address] Only    Program: [facility] in place:  No  Gap Closed?: Yes   Time Spent (min): 5

## 2023-01-26 NOTE — LETTER
[facility] [address]  [address], [address]  Phone: toll free [phone] [phone] [place] [address],[address] [facility] [address]           01/26/23     Dear Nina Do,    We regret to inform you that you have been disqualified from The [address] [place] [address] With Diabetes Program because the following requirements were not met by the stated deadline:     [facility] Department your benefits have been termed on 9/30/22     You will not receive the copay waiver up to $600 towards your diabetic medications and supplies in 2023. If you qualify once again, you will be eligible to reapply to The [facility] Diabetes Management Program the following calendar year. [facility] Team  [phone] [address]  Email: [email or web address]  Fax Number: [phone]

## 2023-03-16 ENCOUNTER — NURSE ONLY (OUTPATIENT)
Dept: INTERNAL MEDICINE CLINIC | Age: 48
End: 2023-03-16
Payer: COMMERCIAL

## 2023-03-16 DIAGNOSIS — E11.9 TYPE 2 DIABETES MELLITUS WITHOUT COMPLICATION, WITHOUT LONG-TERM CURRENT USE OF INSULIN (HCC): ICD-10-CM

## 2023-03-16 LAB — HBA1C MFR BLD: 8.1 %

## 2023-03-16 PROCEDURE — 83036 HEMOGLOBIN GLYCOSYLATED A1C: CPT | Performed by: INTERNAL MEDICINE

## 2023-03-16 PROCEDURE — 99999 PR OFFICE/OUTPT VISIT,PROCEDURE ONLY: CPT | Performed by: INTERNAL MEDICINE

## 2023-03-28 ENCOUNTER — TELEMEDICINE (OUTPATIENT)
Dept: INTERNAL MEDICINE CLINIC | Age: 48
End: 2023-03-28
Payer: COMMERCIAL

## 2023-03-28 DIAGNOSIS — R53.83 FATIGUE, UNSPECIFIED TYPE: ICD-10-CM

## 2023-03-28 DIAGNOSIS — E11.9 TYPE 2 DIABETES MELLITUS WITHOUT COMPLICATION, WITHOUT LONG-TERM CURRENT USE OF INSULIN (HCC): Primary | ICD-10-CM

## 2023-03-28 DIAGNOSIS — E11.69 ERECTILE DYSFUNCTION ASSOCIATED WITH TYPE 2 DIABETES MELLITUS (HCC): ICD-10-CM

## 2023-03-28 DIAGNOSIS — N52.1 ERECTILE DYSFUNCTION ASSOCIATED WITH TYPE 2 DIABETES MELLITUS (HCC): ICD-10-CM

## 2023-03-28 DIAGNOSIS — F41.9 ANXIETY AND DEPRESSION: ICD-10-CM

## 2023-03-28 DIAGNOSIS — F32.A ANXIETY AND DEPRESSION: ICD-10-CM

## 2023-03-28 PROBLEM — E11.65 TYPE 2 DIABETES MELLITUS WITH HYPERGLYCEMIA (HCC): Status: ACTIVE | Noted: 2023-03-28

## 2023-03-28 PROCEDURE — 2022F DILAT RTA XM EVC RTNOPTHY: CPT | Performed by: INTERNAL MEDICINE

## 2023-03-28 PROCEDURE — G8427 DOCREV CUR MEDS BY ELIG CLIN: HCPCS | Performed by: INTERNAL MEDICINE

## 2023-03-28 PROCEDURE — 99214 OFFICE O/P EST MOD 30 MIN: CPT | Performed by: INTERNAL MEDICINE

## 2023-03-28 PROCEDURE — 3052F HG A1C>EQUAL 8.0%<EQUAL 9.0%: CPT | Performed by: INTERNAL MEDICINE

## 2023-03-28 RX ORDER — DAPAGLIFLOZIN AND METFORMIN HYDROCHLORIDE 5; 1000 MG/1; MG/1
5 TABLET, FILM COATED, EXTENDED RELEASE ORAL 2 TIMES DAILY
Qty: 180 TABLET | Refills: 2 | Status: SHIPPED | OUTPATIENT
Start: 2023-03-28

## 2023-03-28 RX ORDER — TIRZEPATIDE 5 MG/.5ML
5 INJECTION, SOLUTION SUBCUTANEOUS WEEKLY
Qty: 4 ADJUSTABLE DOSE PRE-FILLED PEN SYRINGE | Refills: 0 | Status: SHIPPED | OUTPATIENT
Start: 2023-03-28

## 2023-03-28 SDOH — ECONOMIC STABILITY: HOUSING INSECURITY
IN THE LAST 12 MONTHS, WAS THERE A TIME WHEN YOU DID NOT HAVE A STEADY PLACE TO SLEEP OR SLEPT IN A SHELTER (INCLUDING NOW)?: NO

## 2023-03-28 SDOH — ECONOMIC STABILITY: TRANSPORTATION INSECURITY
IN THE PAST 12 MONTHS, HAS LACK OF TRANSPORTATION KEPT YOU FROM MEETINGS, WORK, OR FROM GETTING THINGS NEEDED FOR DAILY LIVING?: NO

## 2023-03-28 SDOH — ECONOMIC STABILITY: FOOD INSECURITY: WITHIN THE PAST 12 MONTHS, YOU WORRIED THAT YOUR FOOD WOULD RUN OUT BEFORE YOU GOT MONEY TO BUY MORE.: NEVER TRUE

## 2023-03-28 SDOH — ECONOMIC STABILITY: FOOD INSECURITY: WITHIN THE PAST 12 MONTHS, THE FOOD YOU BOUGHT JUST DIDN'T LAST AND YOU DIDN'T HAVE MONEY TO GET MORE.: NEVER TRUE

## 2023-03-28 SDOH — ECONOMIC STABILITY: INCOME INSECURITY: HOW HARD IS IT FOR YOU TO PAY FOR THE VERY BASICS LIKE FOOD, HOUSING, MEDICAL CARE, AND HEATING?: NOT HARD AT ALL

## 2023-03-28 NOTE — PROGRESS NOTES
Pursuant to the emergency declaration under the [facility] waiver authority and the [employer] and [employer] Act, this Virtual  Video Visit was conducted, with patient's consent, to reduce the patient's risk of exposure to COVID-19 and provide continuity of care. Service is  provided through a video synchronous discussion virtually to substitute for in-person clinic visit with the patient being at home and Dr. Sung Erickson being at home. Patient consent to the video visit. Date of Service:  3/28/2023    Chief Complaint:      Chief Complaint   Patient presents with    Diabetes       Assessment/Plan:    Rossy Tijerina was seen today for diabetes. Diagnoses and all orders for this visit:    Type 2 diabetes mellitus without complication, without long-term current use of insulin (HCC)  Start Tirzepatide [facility]) 5 MG/0.5ML SOPN SC injection; Inject 0.5 mLs into the skin once a week and cut portion size in half first week on medication.  -     Continuous Blood Gluc Sensor (FREESTYLE MALACHI 2 SENSOR) MISC; Apply every 14 days  Start Dapagliflozin-metFORMIN HCl ER (XIGDUO XR) 5-1000 MG TB24; Take 5 mg by mouth in the morning and at bedtime  Discontinue trijardy    Anxiety and depression  -     Testosterone; Future  Consider adding Rexulti if still depress     Fatigue, unspecified type  -     Testosterone; Future    Erectile dysfunction associated with type 2 diabetes mellitus (HCC)  -     Testosterone; Future    Body mass index (BMI) 45.0-49.9, adult (HCC)  -     Testosterone; Future     Goals:   -Eat small amounts of food 4-5 times daily  -Avoid high fat and high sugar foods  -Include protein with all meals and snacks  -Avoid carbonation and caffeine  -Avoid calorie containing beverages  -Increase physical activity as tolerated      Return VV Diabetes, depression and fatigue 4/25. HPI:  Litzycarina Luisin is a 50 y.o.     He complain of

## 2023-03-29 ENCOUNTER — TELEPHONE (OUTPATIENT)
Dept: ADMINISTRATIVE | Age: 48
End: 2023-03-29

## 2023-03-30 ENCOUNTER — HOSPITAL ENCOUNTER (OUTPATIENT)
Age: 48
Discharge: HOME | End: 2023-03-30
Payer: COMMERCIAL

## 2023-03-30 DIAGNOSIS — E11.69 ERECTILE DYSFUNCTION ASSOCIATED WITH TYPE 2 DIABETES MELLITUS (HCC): ICD-10-CM

## 2023-03-30 DIAGNOSIS — R53.83 FATIGUE, UNSPECIFIED TYPE: ICD-10-CM

## 2023-03-30 DIAGNOSIS — F41.9 ANXIETY AND DEPRESSION: ICD-10-CM

## 2023-03-30 DIAGNOSIS — F32.A ANXIETY AND DEPRESSION: ICD-10-CM

## 2023-03-30 DIAGNOSIS — N52.1 ERECTILE DYSFUNCTION ASSOCIATED WITH TYPE 2 DIABETES MELLITUS (HCC): ICD-10-CM

## 2023-03-30 PROCEDURE — 84403 ASSAY OF TOTAL TESTOSTERONE: CPT

## 2023-04-01 LAB — TESTOST SERPL-MCNC: 204 NG/DL (ref 220–1000)

## 2023-04-03 PROBLEM — E29.1 HYPOGONADISM IN MALE: Status: ACTIVE | Noted: 2023-04-03

## 2023-04-04 ENCOUNTER — TELEPHONE (OUTPATIENT)
Dept: ADMINISTRATIVE | Age: 48
End: 2023-04-04

## 2023-04-04 ENCOUNTER — PATIENT MESSAGE (OUTPATIENT)
Dept: INTERNAL MEDICINE CLINIC | Age: 48
End: 2023-04-04

## 2023-04-04 DIAGNOSIS — E29.1 HYPOGONADISM IN MALE: ICD-10-CM

## 2023-04-04 RX ORDER — TESTOSTERONE 16.2 MG/G
GEL TRANSDERMAL
Qty: 75 G | Refills: 0 | Status: SHIPPED | OUTPATIENT
Start: 2023-04-04 | End: 2023-05-06

## 2023-04-04 NOTE — TELEPHONE ENCOUNTER
PA approved for the Ozempic. Freestyle Imelda 2 needs PA but have not received request from insurance. The Mario spoke with Richard Asif. She will send over via fax. Testosterone will need to be sent to a local pharmacy first for the 30 day trial. Laurel Diss) If appropriate, a 90 day can be sent to the mail order co [facility].

## 2023-04-04 NOTE — TELEPHONE ENCOUNTER
PA submitted VIA CMM for  Ozempic (0.25 or 0.5 MG/DOSE) 2MG/1.5ML pen-injectors  (Key: Chichi Walker)  Message from Plan  PA Case: [id number], Status: Approved, Coverage Starts on: 4/4/2023 12:00:00 AM, Coverage Ends on: 4/3/2024 12:00:00 AM.

## 2023-04-04 NOTE — TELEPHONE ENCOUNTER
From: Rubi Carr  To: Dr. Davonte Romano: 4/4/2023 12:35 PM EDT  Subject: Meds    Ozempic and the freestyle meter needs prior authorization and [facility] won't fill the testosterone for only 30 days.  Has to be 90

## 2023-05-09 ENCOUNTER — TELEMEDICINE (OUTPATIENT)
Dept: INTERNAL MEDICINE CLINIC | Age: 48
End: 2023-05-09
Payer: COMMERCIAL

## 2023-05-09 DIAGNOSIS — F32.A ANXIETY AND DEPRESSION: ICD-10-CM

## 2023-05-09 DIAGNOSIS — E11.9 TYPE 2 DIABETES MELLITUS WITHOUT COMPLICATION, WITHOUT LONG-TERM CURRENT USE OF INSULIN (HCC): Primary | ICD-10-CM

## 2023-05-09 DIAGNOSIS — E29.1 HYPOGONADISM IN MALE: ICD-10-CM

## 2023-05-09 DIAGNOSIS — F41.9 ANXIETY AND DEPRESSION: ICD-10-CM

## 2023-05-09 DIAGNOSIS — I10 ESSENTIAL HYPERTENSION: ICD-10-CM

## 2023-05-09 DIAGNOSIS — E78.5 HYPERLIPIDEMIA WITH TARGET LDL LESS THAN 100: ICD-10-CM

## 2023-05-09 PROCEDURE — G8427 DOCREV CUR MEDS BY ELIG CLIN: HCPCS | Performed by: INTERNAL MEDICINE

## 2023-05-09 PROCEDURE — 3052F HG A1C>EQUAL 8.0%<EQUAL 9.0%: CPT | Performed by: INTERNAL MEDICINE

## 2023-05-09 PROCEDURE — 2022F DILAT RTA XM EVC RTNOPTHY: CPT | Performed by: INTERNAL MEDICINE

## 2023-05-09 PROCEDURE — 99214 OFFICE O/P EST MOD 30 MIN: CPT | Performed by: INTERNAL MEDICINE

## 2023-05-09 RX ORDER — TESTOSTERONE 16.2 MG/G
GEL TRANSDERMAL
Qty: 75 G | Refills: 0 | Status: SHIPPED | OUTPATIENT
Start: 2023-05-09 | End: 2023-06-10

## 2023-05-09 RX ORDER — SEMAGLUTIDE 1.34 MG/ML
1 INJECTION, SOLUTION SUBCUTANEOUS
Qty: 3 ML | Refills: 0 | Status: SHIPPED | OUTPATIENT
Start: 2023-05-09

## 2023-05-09 NOTE — PROGRESS NOTES
Pursuant to the emergency declaration under the [facility] waiver authority and the [employer] and [employer] Act, this Virtual  Video Visit was conducted, with patient's consent, to reduce the patient's risk of exposure to COVID-19 and provide continuity of care. Service is  provided through a video synchronous discussion virtually to substitute for in-person clinic visit with the patient being at home and Dr. Mk Sutherland being at home. Patient consent to the video visit. Date of Service:  5/9/2023    Chief Complaint:      Chief Complaint   Patient presents with    Diabetes    Hypogonadism    Depression       Assessment/Plan:    Romeo Luna was seen today for diabetes and hypogonadism. Diagnoses and all orders for this visit:    Type 2 diabetes mellitus without complication, without long-term current use of insulin ([facility])  Increase Semaglutide, 1 MG/DOSE, (OZEMPIC, 1 MG/DOSE,) 4 MG/3ML SOPN; Inject 1 mg into the skin every 7 days    Hypogonadism in male  -     Testosterone (ANDROGEL) 20.25 MG/ACT (1.62%) GEL gel; 1 pump on each shoulder daily    Anxiety/depression    Hyperlipidemia with target LDL less than 100    Essential hypertension    Stable and continue on current medications. Return Diabetes, Testosterone 6/7. HPI:  Jerrica Paulson is a 50 y.o. Hypogonadalism:  improve fatigue and sleeping better since started on Androgel 1 squirt each shoulder in April. Treatment Adherence:   Medication compliance: compliant all of the time   Diet compliance: compliant most of the time   Weight trend: decreasing   Current exercise: walks 3 time(s) per week   Barriers: time constraints   Diabetes Mellitus Type 2: Current symptoms/problems include: none. stable on Ozempic 0.5 mg qweek, xigduo 5/1000 mg bid and Amaryl 4 mg bid. He has lost weight since getting off Actos.   Home blood sugar records: fasting range: No  No change in

## 2023-06-07 ENCOUNTER — OFFICE VISIT (OUTPATIENT)
Dept: INTERNAL MEDICINE CLINIC | Age: 48
End: 2023-06-07
Payer: COMMERCIAL

## 2023-06-07 VITALS
HEART RATE: 85 BPM | BODY MASS INDEX: 43.67 KG/M2 | SYSTOLIC BLOOD PRESSURE: 128 MMHG | DIASTOLIC BLOOD PRESSURE: 66 MMHG | OXYGEN SATURATION: 98 % | HEIGHT: 70 IN | WEIGHT: 305 LBS

## 2023-06-07 DIAGNOSIS — E11.9 TYPE 2 DIABETES MELLITUS WITHOUT COMPLICATION, WITHOUT LONG-TERM CURRENT USE OF INSULIN (HCC): Primary | ICD-10-CM

## 2023-06-07 DIAGNOSIS — L24.7 CONTACT DERMATITIS AND ECZEMA DUE TO PLANT: ICD-10-CM

## 2023-06-07 DIAGNOSIS — E29.1 HYPOGONADISM IN MALE: ICD-10-CM

## 2023-06-07 LAB — HBA1C MFR BLD: 7.7 %

## 2023-06-07 PROCEDURE — 2022F DILAT RTA XM EVC RTNOPTHY: CPT | Performed by: INTERNAL MEDICINE

## 2023-06-07 PROCEDURE — 36415 COLL VENOUS BLD VENIPUNCTURE: CPT | Performed by: INTERNAL MEDICINE

## 2023-06-07 PROCEDURE — G8427 DOCREV CUR MEDS BY ELIG CLIN: HCPCS | Performed by: INTERNAL MEDICINE

## 2023-06-07 PROCEDURE — G8417 CALC BMI ABV UP PARAM F/U: HCPCS | Performed by: INTERNAL MEDICINE

## 2023-06-07 PROCEDURE — 1036F TOBACCO NON-USER: CPT | Performed by: INTERNAL MEDICINE

## 2023-06-07 PROCEDURE — 3051F HG A1C>EQUAL 7.0%<8.0%: CPT | Performed by: INTERNAL MEDICINE

## 2023-06-07 PROCEDURE — 99214 OFFICE O/P EST MOD 30 MIN: CPT | Performed by: INTERNAL MEDICINE

## 2023-06-07 PROCEDURE — 3074F SYST BP LT 130 MM HG: CPT | Performed by: INTERNAL MEDICINE

## 2023-06-07 PROCEDURE — 83036 HEMOGLOBIN GLYCOSYLATED A1C: CPT | Performed by: INTERNAL MEDICINE

## 2023-06-07 PROCEDURE — 3078F DIAST BP <80 MM HG: CPT | Performed by: INTERNAL MEDICINE

## 2023-06-07 RX ORDER — TESTOSTERONE 16.2 MG/G
GEL TRANSDERMAL
Qty: 3 EACH | Refills: 0 | Status: SHIPPED | OUTPATIENT
Start: 2023-06-07 | End: 2023-07-09

## 2023-06-07 RX ORDER — PREDNISONE 20 MG/1
TABLET ORAL
Qty: 14 TABLET | Refills: 0 | Status: SHIPPED | OUTPATIENT
Start: 2023-06-07 | End: 2023-06-17

## 2023-06-07 RX ORDER — SEMAGLUTIDE 2.68 MG/ML
2 INJECTION, SOLUTION SUBCUTANEOUS
Qty: 9 ML | Refills: 0 | Status: SHIPPED | OUTPATIENT
Start: 2023-06-07

## 2023-06-07 NOTE — PROGRESS NOTES
Encounters:   06/07/23 128/66   01/04/23 136/80   05/02/22 130/66     Body mass index is 43.76 kg/m². Constitutional: Patient appears well-developed and well-nourished. No distress. Head: Normocephalic and atraumatic. Neck: Normal range of motion. Neck supple. No thyroidmegaly. Cardiovascular: Normal rate, regular rhythm, normal heart sounds and intact distal pulses. Pulmonary/Chest: Effort normal and breath sounds normal. No stridor. No respiratory distress. No wheezes and no rales. Abdominal: Soft. Bowel sounds are normal. No distension and no mass. No tenderness. No rebound and no guarding. Musculoskeletal: No edema and no tenderness. Skin: No rash or erythema.

## 2023-06-09 LAB — TESTOST SERPL-MCNC: 394 NG/DL (ref 220–1000)

## 2023-06-27 ENCOUNTER — TELEMEDICINE (OUTPATIENT)
Dept: INTERNAL MEDICINE CLINIC | Age: 48
End: 2023-06-27
Payer: COMMERCIAL

## 2023-06-27 DIAGNOSIS — E29.1 HYPOGONADISM IN MALE: ICD-10-CM

## 2023-06-27 DIAGNOSIS — E11.9 TYPE 2 DIABETES MELLITUS WITHOUT COMPLICATION, WITHOUT LONG-TERM CURRENT USE OF INSULIN (HCC): ICD-10-CM

## 2023-06-27 PROCEDURE — 99213 OFFICE O/P EST LOW 20 MIN: CPT | Performed by: INTERNAL MEDICINE

## 2023-06-27 PROCEDURE — G8427 DOCREV CUR MEDS BY ELIG CLIN: HCPCS | Performed by: INTERNAL MEDICINE

## 2023-06-27 PROCEDURE — 3051F HG A1C>EQUAL 7.0%<8.0%: CPT | Performed by: INTERNAL MEDICINE

## 2023-06-27 PROCEDURE — 2022F DILAT RTA XM EVC RTNOPTHY: CPT | Performed by: INTERNAL MEDICINE

## 2023-06-27 RX ORDER — TESTOSTERONE 16.2 MG/G
GEL TRANSDERMAL
Qty: 225 G | Refills: 0 | Status: SHIPPED | OUTPATIENT
Start: 2023-06-27 | End: 2023-06-28 | Stop reason: SDUPTHER

## 2023-06-27 RX ORDER — SEMAGLUTIDE 2.68 MG/ML
2 INJECTION, SOLUTION SUBCUTANEOUS
Qty: 9 ML | Refills: 0 | Status: SHIPPED | OUTPATIENT
Start: 2023-08-01

## 2023-06-28 DIAGNOSIS — E29.1 HYPOGONADISM IN MALE: ICD-10-CM

## 2023-06-28 RX ORDER — TESTOSTERONE 16.2 MG/G
GEL TRANSDERMAL
Qty: 450 G | Refills: 0 | Status: SHIPPED | OUTPATIENT
Start: 2023-06-28 | End: 2023-07-30

## 2023-07-05 DIAGNOSIS — E29.1 HYPOGONADISM IN MALE: ICD-10-CM

## 2023-07-05 RX ORDER — TESTOSTERONE 16.2 MG/G
GEL TRANSDERMAL
Qty: 75 G | Refills: 1 | Status: SHIPPED | OUTPATIENT
Start: 2023-07-05 | End: 2023-08-06

## 2023-07-17 DIAGNOSIS — E29.1 HYPOGONADISM IN MALE: ICD-10-CM

## 2023-07-17 RX ORDER — TESTOSTERONE 16.2 MG/G
GEL TRANSDERMAL
Qty: 150 G | Refills: 2 | Status: SHIPPED | OUTPATIENT
Start: 2023-07-17 | End: 2023-10-31

## 2023-07-20 ENCOUNTER — PATIENT MESSAGE (OUTPATIENT)
Dept: INTERNAL MEDICINE CLINIC | Age: 48
End: 2023-07-20

## 2023-07-20 DIAGNOSIS — I10 ESSENTIAL HYPERTENSION: ICD-10-CM

## 2023-07-20 DIAGNOSIS — E78.5 HYPERLIPIDEMIA WITH TARGET LDL LESS THAN 100: ICD-10-CM

## 2023-07-20 DIAGNOSIS — F41.9 ANXIETY AND DEPRESSION: ICD-10-CM

## 2023-07-20 DIAGNOSIS — J45.909 ASTHMA DUE TO SEASONAL ALLERGIES: ICD-10-CM

## 2023-07-20 DIAGNOSIS — F32.A ANXIETY AND DEPRESSION: ICD-10-CM

## 2023-07-24 RX ORDER — ESCITALOPRAM OXALATE 20 MG/1
TABLET ORAL
Qty: 30 TABLET | Refills: 0 | Status: SHIPPED | OUTPATIENT
Start: 2023-07-24

## 2023-07-24 RX ORDER — ROSUVASTATIN CALCIUM 20 MG/1
20 TABLET, COATED ORAL NIGHTLY
Qty: 30 TABLET | Refills: 0 | Status: SHIPPED | OUTPATIENT
Start: 2023-07-24

## 2023-07-24 RX ORDER — MONTELUKAST SODIUM 10 MG/1
TABLET ORAL
Qty: 30 TABLET | Refills: 0 | Status: SHIPPED | OUTPATIENT
Start: 2023-07-24

## 2023-07-24 RX ORDER — LISINOPRIL 40 MG/1
40 TABLET ORAL DAILY
Qty: 30 TABLET | Refills: 0 | Status: SHIPPED | OUTPATIENT
Start: 2023-07-24

## 2023-07-24 NOTE — TELEPHONE ENCOUNTER
Patient lost his insurance and needs medication refills sent to local pharmacy. Rx's pended and changed to 30 day supply.      Next appointment 08/14/2023

## 2023-07-24 NOTE — TELEPHONE ENCOUNTER
From: Maximo Banks  To: Dr. Laura Chacon: 7/20/2023 7:38 PM EDT  Subject: 2 things    I've lost my insurance. I'll run out of rosuvastatin, escitalopram, montelukast and lisinopril next Friday. [facility] won't refill them. Also, can we switch my July 26th appointment to August 14?  I'm in school until then    Thank you

## 2023-08-14 DIAGNOSIS — F41.9 ANXIETY AND DEPRESSION: ICD-10-CM

## 2023-08-14 DIAGNOSIS — J45.909 ASTHMA DUE TO SEASONAL ALLERGIES: ICD-10-CM

## 2023-08-14 DIAGNOSIS — E78.5 HYPERLIPIDEMIA WITH TARGET LDL LESS THAN 100: ICD-10-CM

## 2023-08-14 DIAGNOSIS — F32.A ANXIETY AND DEPRESSION: ICD-10-CM

## 2023-08-14 DIAGNOSIS — I10 ESSENTIAL HYPERTENSION: ICD-10-CM

## 2023-08-14 RX ORDER — MONTELUKAST SODIUM 10 MG/1
TABLET ORAL
Qty: 30 TABLET | Refills: 0 | OUTPATIENT
Start: 2023-08-14

## 2023-08-14 RX ORDER — ESCITALOPRAM OXALATE 20 MG/1
TABLET ORAL
Qty: 30 TABLET | Refills: 0 | OUTPATIENT
Start: 2023-08-14

## 2023-08-14 RX ORDER — BUPROPION HYDROCHLORIDE 300 MG/1
300 TABLET ORAL EVERY MORNING
Qty: 30 TABLET | Refills: 0 | Status: SHIPPED | OUTPATIENT
Start: 2023-08-14 | End: 2023-08-28 | Stop reason: SDUPTHER

## 2023-08-14 RX ORDER — LISINOPRIL 40 MG/1
TABLET ORAL
Qty: 30 TABLET | Refills: 0 | OUTPATIENT
Start: 2023-08-14

## 2023-08-14 RX ORDER — ROSUVASTATIN CALCIUM 20 MG/1
TABLET, COATED ORAL
Qty: 30 TABLET | Refills: 0 | OUTPATIENT
Start: 2023-08-14

## 2023-08-14 NOTE — TELEPHONE ENCOUNTER
Patient requesting refill of Wellbutrin to [facility]. Last rx was cancelled at mail order.  Patient has follow up appt 08/28/2023

## 2023-08-17 DIAGNOSIS — F41.9 ANXIETY AND DEPRESSION: ICD-10-CM

## 2023-08-17 DIAGNOSIS — J45.909 ASTHMA DUE TO SEASONAL ALLERGIES: ICD-10-CM

## 2023-08-17 DIAGNOSIS — F32.A ANXIETY AND DEPRESSION: ICD-10-CM

## 2023-08-17 DIAGNOSIS — E78.5 HYPERLIPIDEMIA WITH TARGET LDL LESS THAN 100: ICD-10-CM

## 2023-08-17 DIAGNOSIS — I10 ESSENTIAL HYPERTENSION: ICD-10-CM

## 2023-08-21 RX ORDER — MONTELUKAST SODIUM 10 MG/1
TABLET ORAL
Qty: 30 TABLET | Refills: 0 | Status: SHIPPED | OUTPATIENT
Start: 2023-08-21

## 2023-08-21 RX ORDER — ESCITALOPRAM OXALATE 20 MG/1
TABLET ORAL
Qty: 30 TABLET | Refills: 0 | Status: SHIPPED | OUTPATIENT
Start: 2023-08-21

## 2023-08-21 RX ORDER — LISINOPRIL 40 MG/1
TABLET ORAL
Qty: 30 TABLET | Refills: 0 | Status: SHIPPED | OUTPATIENT
Start: 2023-08-21

## 2023-08-21 RX ORDER — ROSUVASTATIN CALCIUM 20 MG/1
TABLET, COATED ORAL
Qty: 30 TABLET | Refills: 0 | Status: SHIPPED | OUTPATIENT
Start: 2023-08-21

## 2023-08-21 NOTE — TELEPHONE ENCOUNTER
LAST REFILL 07/24/2023  AMOUNT 30     0 REFILLS  LAST VISIT 06/27/2023  NEXT VISIT 08/28/2023    Patient does not have enough medication to last until next appointment.

## 2023-08-28 ENCOUNTER — OFFICE VISIT (OUTPATIENT)
Dept: INTERNAL MEDICINE CLINIC | Age: 48
End: 2023-08-28

## 2023-08-28 VITALS
HEART RATE: 75 BPM | DIASTOLIC BLOOD PRESSURE: 60 MMHG | OXYGEN SATURATION: 98 % | SYSTOLIC BLOOD PRESSURE: 112 MMHG | HEIGHT: 70 IN | BODY MASS INDEX: 42.8 KG/M2 | WEIGHT: 299 LBS

## 2023-08-28 DIAGNOSIS — F32.A ANXIETY AND DEPRESSION: ICD-10-CM

## 2023-08-28 DIAGNOSIS — E78.5 HYPERLIPIDEMIA WITH TARGET LDL LESS THAN 100: ICD-10-CM

## 2023-08-28 DIAGNOSIS — I10 ESSENTIAL HYPERTENSION: ICD-10-CM

## 2023-08-28 DIAGNOSIS — F41.9 ANXIETY AND DEPRESSION: ICD-10-CM

## 2023-08-28 DIAGNOSIS — E11.9 TYPE 2 DIABETES MELLITUS WITHOUT COMPLICATION, WITHOUT LONG-TERM CURRENT USE OF INSULIN (HCC): Primary | ICD-10-CM

## 2023-08-28 DIAGNOSIS — J45.909 ASTHMA DUE TO SEASONAL ALLERGIES: ICD-10-CM

## 2023-08-28 LAB — HBA1C MFR BLD: 7.2 %

## 2023-08-28 PROCEDURE — 99213 OFFICE O/P EST LOW 20 MIN: CPT | Performed by: INTERNAL MEDICINE

## 2023-08-28 PROCEDURE — 3078F DIAST BP <80 MM HG: CPT | Performed by: INTERNAL MEDICINE

## 2023-08-28 PROCEDURE — 83036 HEMOGLOBIN GLYCOSYLATED A1C: CPT | Performed by: INTERNAL MEDICINE

## 2023-08-28 PROCEDURE — 3074F SYST BP LT 130 MM HG: CPT | Performed by: INTERNAL MEDICINE

## 2023-08-28 PROCEDURE — 3051F HG A1C>EQUAL 7.0%<8.0%: CPT | Performed by: INTERNAL MEDICINE

## 2023-08-28 RX ORDER — ROSUVASTATIN CALCIUM 20 MG/1
20 TABLET, COATED ORAL NIGHTLY
Qty: 30 TABLET | Refills: 0 | Status: SHIPPED | OUTPATIENT
Start: 2023-08-28

## 2023-08-28 RX ORDER — SEMAGLUTIDE 2.68 MG/ML
2 INJECTION, SOLUTION SUBCUTANEOUS
Qty: 9 ML | Refills: 0 | Status: SHIPPED | OUTPATIENT
Start: 2023-08-28

## 2023-08-28 RX ORDER — LISINOPRIL 40 MG/1
40 TABLET ORAL DAILY
Qty: 30 TABLET | Refills: 0 | Status: SHIPPED | OUTPATIENT
Start: 2023-08-28

## 2023-08-28 RX ORDER — MONTELUKAST SODIUM 10 MG/1
TABLET ORAL
Qty: 30 TABLET | Refills: 0 | Status: SHIPPED | OUTPATIENT
Start: 2023-08-28

## 2023-08-28 RX ORDER — ESCITALOPRAM OXALATE 20 MG/1
TABLET ORAL
Qty: 30 TABLET | Refills: 0 | Status: SHIPPED | OUTPATIENT
Start: 2023-08-28

## 2023-08-28 RX ORDER — BUPROPION HYDROCHLORIDE 300 MG/1
300 TABLET ORAL EVERY MORNING
Qty: 30 TABLET | Refills: 0 | Status: SHIPPED | OUTPATIENT
Start: 2023-08-28

## 2023-08-28 NOTE — PROGRESS NOTES
well-nourished. No distress. Head: Normocephalic and atraumatic. Neck: Normal range of motion. Neck supple. No thyroidmegaly. Cardiovascular: Normal rate, regular rhythm, normal heart sounds and intact distal pulses. Pulmonary/Chest: Effort normal and breath sounds normal. No stridor. No respiratory distress. No wheezes and no rales. Abdominal: Soft. Bowel sounds are normal. No distension and no mass. No tenderness. No rebound and no guarding. Musculoskeletal: No edema and no tenderness. Skin: No rash or erythema.

## 2023-09-16 DIAGNOSIS — E11.9 TYPE 2 DIABETES MELLITUS WITHOUT COMPLICATION, WITHOUT LONG-TERM CURRENT USE OF INSULIN (HCC): ICD-10-CM

## 2023-09-16 DIAGNOSIS — I10 ESSENTIAL HYPERTENSION: ICD-10-CM

## 2023-09-18 RX ORDER — LISINOPRIL 40 MG/1
40 TABLET ORAL DAILY
Qty: 30 TABLET | Refills: 0 | Status: SHIPPED | OUTPATIENT
Start: 2023-09-18 | End: 2023-10-04 | Stop reason: SDUPTHER

## 2023-09-18 RX ORDER — GLIMEPIRIDE 4 MG/1
4 TABLET ORAL 2 TIMES DAILY
Qty: 60 TABLET | Refills: 0 | Status: SHIPPED | OUTPATIENT
Start: 2023-09-18 | End: 2023-10-04 | Stop reason: SDUPTHER

## 2023-09-18 NOTE — TELEPHONE ENCOUNTER
Patient still does not have insurance. Requesting refills.    LAST REFILL 08/28/2023  AMOUNT 30     0 REFILLS  LAST VISIT 08/28/2023  NEXT VISIT no follow up on file

## 2023-09-27 ENCOUNTER — OFFICE VISIT (OUTPATIENT)
Dept: INTERNAL MEDICINE CLINIC | Age: 48
End: 2023-09-27

## 2023-09-27 VITALS
SYSTOLIC BLOOD PRESSURE: 112 MMHG | WEIGHT: 301 LBS | HEART RATE: 82 BPM | OXYGEN SATURATION: 98 % | HEIGHT: 70 IN | DIASTOLIC BLOOD PRESSURE: 70 MMHG | BODY MASS INDEX: 43.09 KG/M2

## 2023-09-27 DIAGNOSIS — H93.8X2 CLOGGED EAR, LEFT: Primary | ICD-10-CM

## 2023-09-27 PROCEDURE — 99212 OFFICE O/P EST SF 10 MIN: CPT | Performed by: INTERNAL MEDICINE

## 2023-09-27 PROCEDURE — 3078F DIAST BP <80 MM HG: CPT | Performed by: INTERNAL MEDICINE

## 2023-09-27 PROCEDURE — 3074F SYST BP LT 130 MM HG: CPT | Performed by: INTERNAL MEDICINE

## 2023-09-27 NOTE — PROGRESS NOTES
Tyron Wells  YOB: 1975    Date of Service:  9/27/2023    Chief Complaint:      Chief Complaint   Patient presents with    Otalgia     Left ear x 1 day       Assessment/Plan:  Qamar Murphy was seen today for otalgia. Diagnoses and all orders for this visit:    Clogged ear, left    Start zyrtec 10 mg qhs    Return VV Diabetes, BP, cholesterol. ..11/14. when insurance goes in effect. HPI:  Tyron Wells is a 50 y.o. He noticed his left ear feels clogged, not too painful, with yellowish discharge today after he showered. He has not been swimming. No cough, fever or chills. He used to get this once a year and uses a friend's steroid drops which help. He does have allergies on just singulair currently.     Lab Results   Component Value Date    LABA1C 7.2 08/28/2023    LABA1C 7.7 06/07/2023    LABA1C 8.1 03/16/2023     Lab Results   Component Value Date     01/04/2023    K 4.8 01/04/2023    CL 98 (L) 01/04/2023    CO2 24 01/04/2023    BUN 15 01/04/2023    CREATININE 0.9 01/04/2023    GLUCOSE 226 (H) 01/04/2023    CALCIUM 9.6 01/04/2023     Lab Results   Component Value Date/Time    CHOL 158 01/04/2023 08:28 AM    TRIG 165 01/04/2023 08:28 AM    HDL 50 01/04/2023 08:28 AM    HDL 43 05/23/2012 12:33 AM    LDLCALC 75 01/04/2023 08:28 AM    LDLDIRECT 76 03/13/2013 06:55 AM     Lab Results   Component Value Date    ALT 24 01/04/2023    AST 18 01/04/2023     Lab Results   Component Value Date    TSH 2.02 08/24/2016    TSH 0.93 06/12/2015     Lab Results   Component Value Date    WBC 4.4 01/04/2023    HGB 12.4 (L) 01/04/2023    HCT 39.5 (L) 01/04/2023    MCV 81.2 01/04/2023     01/04/2023     Lab Results   Component Value Date    INR 0.84 (L) 03/13/2013      No results found for: \"PSA\"   No results found for: \"LABURIC\"     Patient Active Problem List   Diagnosis    Hyperlipidemia with target LDL less than 100    Essential hypertension    Type 2 diabetes mellitus without complication,

## 2023-10-04 DIAGNOSIS — J45.909 ASTHMA DUE TO SEASONAL ALLERGIES: ICD-10-CM

## 2023-10-04 DIAGNOSIS — E11.9 TYPE 2 DIABETES MELLITUS WITHOUT COMPLICATION, WITHOUT LONG-TERM CURRENT USE OF INSULIN (HCC): ICD-10-CM

## 2023-10-04 DIAGNOSIS — F32.A ANXIETY AND DEPRESSION: ICD-10-CM

## 2023-10-04 DIAGNOSIS — I10 ESSENTIAL HYPERTENSION: ICD-10-CM

## 2023-10-04 DIAGNOSIS — F41.9 ANXIETY AND DEPRESSION: ICD-10-CM

## 2023-10-04 DIAGNOSIS — E78.5 HYPERLIPIDEMIA WITH TARGET LDL LESS THAN 100: ICD-10-CM

## 2023-10-04 RX ORDER — BUPROPION HYDROCHLORIDE 300 MG/1
300 TABLET ORAL EVERY MORNING
Qty: 30 TABLET | Refills: 1 | Status: SHIPPED | OUTPATIENT
Start: 2023-10-04 | End: 2023-12-04 | Stop reason: SDUPTHER

## 2023-10-04 RX ORDER — DAPAGLIFLOZIN AND METFORMIN HYDROCHLORIDE 5; 1000 MG/1; MG/1
5 TABLET, FILM COATED, EXTENDED RELEASE ORAL 2 TIMES DAILY
Qty: 60 TABLET | Refills: 1 | Status: SHIPPED | OUTPATIENT
Start: 2023-10-04 | End: 2023-12-04 | Stop reason: ALTCHOICE

## 2023-10-04 RX ORDER — MONTELUKAST SODIUM 10 MG/1
TABLET ORAL
Qty: 30 TABLET | Refills: 1 | Status: SHIPPED | OUTPATIENT
Start: 2023-10-04 | End: 2023-12-04 | Stop reason: SDUPTHER

## 2023-10-04 RX ORDER — ROSUVASTATIN CALCIUM 20 MG/1
20 TABLET, COATED ORAL NIGHTLY
Qty: 30 TABLET | Refills: 1 | Status: SHIPPED | OUTPATIENT
Start: 2023-10-04 | End: 2023-12-04 | Stop reason: SDUPTHER

## 2023-10-04 RX ORDER — GLIMEPIRIDE 4 MG/1
4 TABLET ORAL 2 TIMES DAILY
Qty: 60 TABLET | Refills: 1 | Status: SHIPPED | OUTPATIENT
Start: 2023-10-04

## 2023-10-04 RX ORDER — LISINOPRIL 40 MG/1
40 TABLET ORAL DAILY
Qty: 30 TABLET | Refills: 1 | Status: SHIPPED | OUTPATIENT
Start: 2023-10-04 | End: 2023-12-04 | Stop reason: SDUPTHER

## 2023-10-04 RX ORDER — ESCITALOPRAM OXALATE 20 MG/1
TABLET ORAL
Qty: 30 TABLET | Refills: 1 | Status: SHIPPED | OUTPATIENT
Start: 2023-10-04 | End: 2023-12-04 | Stop reason: SDUPTHER

## 2023-10-04 NOTE — TELEPHONE ENCOUNTER
Patient requesting refills through December.  He will not have insurance until after December 1st.     Please advise

## 2023-12-04 ENCOUNTER — OFFICE VISIT (OUTPATIENT)
Dept: INTERNAL MEDICINE CLINIC | Age: 48
End: 2023-12-04
Payer: COMMERCIAL

## 2023-12-04 VITALS
WEIGHT: 295 LBS | DIASTOLIC BLOOD PRESSURE: 72 MMHG | BODY MASS INDEX: 42.23 KG/M2 | HEART RATE: 78 BPM | HEIGHT: 70 IN | OXYGEN SATURATION: 97 % | SYSTOLIC BLOOD PRESSURE: 128 MMHG

## 2023-12-04 DIAGNOSIS — E78.5 HYPERLIPIDEMIA WITH TARGET LDL LESS THAN 100: ICD-10-CM

## 2023-12-04 DIAGNOSIS — Z00.00 ENCOUNTER FOR WELL ADULT EXAM WITHOUT ABNORMAL FINDINGS: Primary | ICD-10-CM

## 2023-12-04 DIAGNOSIS — E29.1 HYPOGONADISM IN MALE: ICD-10-CM

## 2023-12-04 DIAGNOSIS — J45.909 ASTHMA DUE TO SEASONAL ALLERGIES: ICD-10-CM

## 2023-12-04 DIAGNOSIS — Z23 NEED FOR INFLUENZA VACCINATION: ICD-10-CM

## 2023-12-04 DIAGNOSIS — F41.9 ANXIETY AND DEPRESSION: ICD-10-CM

## 2023-12-04 DIAGNOSIS — F32.A ANXIETY AND DEPRESSION: ICD-10-CM

## 2023-12-04 DIAGNOSIS — I10 ESSENTIAL HYPERTENSION: ICD-10-CM

## 2023-12-04 DIAGNOSIS — E11.9 TYPE 2 DIABETES MELLITUS WITHOUT COMPLICATION, WITHOUT LONG-TERM CURRENT USE OF INSULIN (HCC): ICD-10-CM

## 2023-12-04 LAB — HBA1C MFR BLD: 7.4 %

## 2023-12-04 PROCEDURE — 36415 COLL VENOUS BLD VENIPUNCTURE: CPT | Performed by: INTERNAL MEDICINE

## 2023-12-04 PROCEDURE — 90471 IMMUNIZATION ADMIN: CPT | Performed by: INTERNAL MEDICINE

## 2023-12-04 PROCEDURE — 3074F SYST BP LT 130 MM HG: CPT | Performed by: INTERNAL MEDICINE

## 2023-12-04 PROCEDURE — 83036 HEMOGLOBIN GLYCOSYLATED A1C: CPT | Performed by: INTERNAL MEDICINE

## 2023-12-04 PROCEDURE — 3078F DIAST BP <80 MM HG: CPT | Performed by: INTERNAL MEDICINE

## 2023-12-04 PROCEDURE — 90674 CCIIV4 VAC NO PRSV 0.5 ML IM: CPT | Performed by: INTERNAL MEDICINE

## 2023-12-04 PROCEDURE — 99396 PREV VISIT EST AGE 40-64: CPT | Performed by: INTERNAL MEDICINE

## 2023-12-04 RX ORDER — MONTELUKAST SODIUM 10 MG/1
TABLET ORAL
Qty: 90 TABLET | Refills: 3 | Status: SHIPPED | OUTPATIENT
Start: 2023-12-04

## 2023-12-04 RX ORDER — ESCITALOPRAM OXALATE 20 MG/1
TABLET ORAL
Qty: 90 TABLET | Refills: 3 | Status: SHIPPED | OUTPATIENT
Start: 2023-12-04

## 2023-12-04 RX ORDER — BUPROPION HYDROCHLORIDE 300 MG/1
300 TABLET ORAL EVERY MORNING
Qty: 90 TABLET | Refills: 3 | Status: SHIPPED | OUTPATIENT
Start: 2023-12-04

## 2023-12-04 RX ORDER — SEMAGLUTIDE 2.68 MG/ML
2 INJECTION, SOLUTION SUBCUTANEOUS
Qty: 9 ML | Refills: 1 | Status: SHIPPED | OUTPATIENT
Start: 2023-12-04

## 2023-12-04 RX ORDER — LISINOPRIL 40 MG/1
40 TABLET ORAL DAILY
Qty: 90 TABLET | Refills: 3 | Status: SHIPPED | OUTPATIENT
Start: 2023-12-04

## 2023-12-04 RX ORDER — METFORMIN HYDROCHLORIDE 500 MG/1
1000 TABLET, EXTENDED RELEASE ORAL 2 TIMES DAILY
Qty: 360 TABLET | Refills: 3 | Status: SHIPPED | OUTPATIENT
Start: 2023-12-04

## 2023-12-04 RX ORDER — ROSUVASTATIN CALCIUM 20 MG/1
20 TABLET, COATED ORAL NIGHTLY
Qty: 90 TABLET | Refills: 3 | Status: SHIPPED | OUTPATIENT
Start: 2023-12-04

## 2023-12-04 RX ORDER — TESTOSTERONE 16.2 MG/G
GEL TRANSDERMAL
Qty: 3 EACH | Refills: 1 | Status: SHIPPED | OUTPATIENT
Start: 2023-12-04 | End: 2024-03-19

## 2023-12-04 NOTE — PROGRESS NOTES
[facility]  History and Physical  Danielle Palomino M.D. Carolyn Ruiz  YOB: 1975    Date of Service:  12/4/2023    Chief Complaint:   Carolyn Ruiz is a 46 y.o. male who presents for   Chief Complaint   Patient presents with    Annual Exam       Assessment/Plan:    Andree Ruano was seen today for annual exam.    Diagnoses and all orders for this visit:    Encounter for well adult exam without abnormal findings  -     Comprehensive Metabolic Panel  -     Lipid Panel  -     CBC with Auto Differential    Need for influenza vaccination  -     Influenza, FLUCELVAX, (age 10 mo+), IM, Preservative Free, 0.5 mL    Type 2 diabetes mellitus without complication, without long-term current use of insulin ([facility])  -     POCT glycosylated hemoglobin (Hb A1C)  Change to dapagliflozin (FARXIGA) 10 MG tablet; Take 1 tablet by mouth every morning  Change to metFORMIN (GLUCOPHAGE-XR) 500 MG extended release tablet; Take 2 tablets by mouth in the morning and at bedtime  -     Semaglutide, 2 MG/DOSE, (OZEMPIC, 2 MG/DOSE,) 8 MG/3ML SOPN; Inject 2 mg into the skin every 7 days  -     Cancel: Microalbumin / Creatinine Urine Ratio  -     Diabetic Foot Exam  -     Microalbumin / Creatinine Urine Ratio    Anxiety and depression  -     buPROPion (WELLBUTRIN XL) 300 MG extended release tablet; Take 1 tablet by mouth every morning  -     escitalopram (LEXAPRO) 20 MG tablet; TAKE 1 TABLET BY MOUTH EVERY DAY    Essential hypertension  -     lisinopril (PRINIVIL;ZESTRIL) 40 MG tablet; Take 1 tablet by mouth daily    Asthma due to seasonal allergies  -     montelukast (SINGULAIR) 10 MG tablet; TAKE 1 TABLET BY MOUTH 1 TIME A DAY    Hyperlipidemia with target LDL less than 100  -     rosuvastatin (CRESTOR) 20 MG tablet; Take 1 tablet by mouth nightly    Hypogonadism in male  -     Testosterone (ANDROGEL) 20.25 MG/ACT (1.62%) GEL gel; 2 pump on each shoulder qd      Return Diabetes 2/26.       HPI: Here for Annual

## 2023-12-05 LAB
ALBUMIN SERPL-MCNC: 4.8 G/DL (ref 3.4–5)
ALBUMIN/GLOB SERPL: 1.8 {RATIO} (ref 1.1–2.2)
ALP SERPL-CCNC: 75 U/L (ref 40–129)
ALT SERPL-CCNC: 21 U/L (ref 10–40)
ANION GAP SERPL CALCULATED.3IONS-SCNC: 9 MMOL/L (ref 3–16)
AST SERPL-CCNC: 17 U/L (ref 15–37)
BASOPHILS # BLD: 0.1 K/UL (ref 0–0.2)
BASOPHILS NFR BLD: 1.2 %
BILIRUB SERPL-MCNC: 0.6 MG/DL (ref 0–1)
BUN SERPL-MCNC: 10 MG/DL (ref 7–20)
CALCIUM SERPL-MCNC: 9.9 MG/DL (ref 8.3–10.6)
CHLORIDE SERPL-SCNC: 99 MMOL/L (ref 99–110)
CHOLEST SERPL-MCNC: 115 MG/DL (ref 0–199)
CO2 SERPL-SCNC: 29 MMOL/L (ref 21–32)
CREAT SERPL-MCNC: 0.9 MG/DL (ref 0.9–1.3)
CREAT UR-MCNC: 79.2 MG/DL (ref 39–259)
DEPRECATED RDW RBC AUTO: 16.2 % (ref 12.4–15.4)
EOSINOPHIL # BLD: 0.1 K/UL (ref 0–0.6)
EOSINOPHIL NFR BLD: 2.2 %
GFR SERPLBLD CREATININE-BSD FMLA CKD-EPI: >60 ML/MIN/{1.73_M2}
GLUCOSE SERPL-MCNC: 146 MG/DL (ref 70–99)
HCT VFR BLD AUTO: 41.3 % (ref 40.5–52.5)
HDLC SERPL-MCNC: 56 MG/DL (ref 40–60)
HGB BLD-MCNC: 13.3 G/DL (ref 13.5–17.5)
LDLC SERPL CALC-MCNC: 39 MG/DL
LYMPHOCYTES # BLD: 1 K/UL (ref 1–5.1)
LYMPHOCYTES NFR BLD: 20.5 %
MCH RBC QN AUTO: 27.1 PG (ref 26–34)
MCHC RBC AUTO-ENTMCNC: 32.1 G/DL (ref 31–36)
MCV RBC AUTO: 84.4 FL (ref 80–100)
MICROALBUMIN UR DL<=1MG/L-MCNC: 3.6 MG/DL
MICROALBUMIN/CREAT UR: 45.5 MG/G (ref 0–30)
MONOCYTES # BLD: 0.4 K/UL (ref 0–1.3)
MONOCYTES NFR BLD: 7.4 %
NEUTROPHILS # BLD: 3.5 K/UL (ref 1.7–7.7)
NEUTROPHILS NFR BLD: 68.7 %
PLATELET # BLD AUTO: 389 K/UL (ref 135–450)
PMV BLD AUTO: 7.9 FL (ref 5–10.5)
POTASSIUM SERPL-SCNC: 5 MMOL/L (ref 3.5–5.1)
PROT SERPL-MCNC: 7.5 G/DL (ref 6.4–8.2)
RBC # BLD AUTO: 4.9 M/UL (ref 4.2–5.9)
SODIUM SERPL-SCNC: 137 MMOL/L (ref 136–145)
TRIGL SERPL-MCNC: 102 MG/DL (ref 0–150)
VLDLC SERPL CALC-MCNC: 20 MG/DL
WBC # BLD AUTO: 5.1 K/UL (ref 4–11)

## 2024-02-26 ENCOUNTER — OFFICE VISIT (OUTPATIENT)
Dept: INTERNAL MEDICINE CLINIC | Age: 49
End: 2024-02-26
Payer: COMMERCIAL

## 2024-02-26 VITALS
BODY MASS INDEX: 40.52 KG/M2 | HEIGHT: 70 IN | DIASTOLIC BLOOD PRESSURE: 62 MMHG | HEART RATE: 90 BPM | SYSTOLIC BLOOD PRESSURE: 98 MMHG | WEIGHT: 283 LBS | OXYGEN SATURATION: 97 % | TEMPERATURE: 98.3 F

## 2024-02-26 DIAGNOSIS — I10 ESSENTIAL HYPERTENSION: ICD-10-CM

## 2024-02-26 DIAGNOSIS — E11.9 TYPE 2 DIABETES MELLITUS WITHOUT COMPLICATION, WITHOUT LONG-TERM CURRENT USE OF INSULIN (HCC): Primary | ICD-10-CM

## 2024-02-26 DIAGNOSIS — E66.01 OBESITY, CLASS III, BMI 40-49.9 (MORBID OBESITY) (HCC): ICD-10-CM

## 2024-02-26 LAB — HBA1C MFR BLD: 7.8 %

## 2024-02-26 PROCEDURE — 83036 HEMOGLOBIN GLYCOSYLATED A1C: CPT | Performed by: INTERNAL MEDICINE

## 2024-02-26 PROCEDURE — 99213 OFFICE O/P EST LOW 20 MIN: CPT | Performed by: INTERNAL MEDICINE

## 2024-02-26 PROCEDURE — 3051F HG A1C>EQUAL 7.0%<8.0%: CPT | Performed by: INTERNAL MEDICINE

## 2024-02-26 PROCEDURE — 3074F SYST BP LT 130 MM HG: CPT | Performed by: INTERNAL MEDICINE

## 2024-02-26 PROCEDURE — 3078F DIAST BP <80 MM HG: CPT | Performed by: INTERNAL MEDICINE

## 2024-02-26 RX ORDER — GLIMEPIRIDE 4 MG/1
4 TABLET ORAL 2 TIMES DAILY
Qty: 180 TABLET | Refills: 0 | Status: SHIPPED | OUTPATIENT
Start: 2024-02-26

## 2024-02-26 ASSESSMENT — PATIENT HEALTH QUESTIONNAIRE - PHQ9
2. FEELING DOWN, DEPRESSED OR HOPELESS: 0
6. FEELING BAD ABOUT YOURSELF - OR THAT YOU ARE A FAILURE OR HAVE LET YOURSELF OR YOUR FAMILY DOWN: 0
1. LITTLE INTEREST OR PLEASURE IN DOING THINGS: 0
4. FEELING TIRED OR HAVING LITTLE ENERGY: 0
SUM OF ALL RESPONSES TO PHQ QUESTIONS 1-9: 0
8. MOVING OR SPEAKING SO SLOWLY THAT OTHER PEOPLE COULD HAVE NOTICED. OR THE OPPOSITE, BEING SO FIGETY OR RESTLESS THAT YOU HAVE BEEN MOVING AROUND A LOT MORE THAN USUAL: 0
SUM OF ALL RESPONSES TO PHQ QUESTIONS 1-9: 0
SUM OF ALL RESPONSES TO PHQ QUESTIONS 1-9: 0
10. IF YOU CHECKED OFF ANY PROBLEMS, HOW DIFFICULT HAVE THESE PROBLEMS MADE IT FOR YOU TO DO YOUR WORK, TAKE CARE OF THINGS AT HOME, OR GET ALONG WITH OTHER PEOPLE: 0
7. TROUBLE CONCENTRATING ON THINGS, SUCH AS READING THE NEWSPAPER OR WATCHING TELEVISION: 0
9. THOUGHTS THAT YOU WOULD BE BETTER OFF DEAD, OR OF HURTING YOURSELF: 0
SUM OF ALL RESPONSES TO PHQ9 QUESTIONS 1 & 2: 0
5. POOR APPETITE OR OVEREATING: 0
3. TROUBLE FALLING OR STAYING ASLEEP: 0

## 2024-02-26 NOTE — PROGRESS NOTES
No results found for: \"LABURIC\"     Patient Active Problem List   Diagnosis    Hyperlipidemia with target LDL less than 100    Essential hypertension    Type 2 diabetes mellitus without complication, without long-term current use of insulin ([facility])    MICHAEL (obstructive sleep apnea)    S/P laparoscopic sleeve gastrectomy    Morbid obesity with BMI of 40.0-44.9, adult ([facility])    Asthma due to seasonal allergies    Erectile dysfunction associated with type 2 diabetes mellitus ([facility])    Perforation of left tympanic membrane    Primary insomnia    Anxiety and depression    Type 2 diabetes mellitus with hyperglycemia    Body mass index (BMI) 45.0-49.9, adult ([facility])    Hypogonadism in male       Allergies   Allergen Reactions    Actos [Pioglitazone] Swelling     Diffuse weight gain     Outpatient Medications Marked as Taking for the 2/26/24 encounter (Office Visit) with JoanCarmen MD   Medication Sig Dispense Refill    buPROPion (WELLBUTRIN XL) 300 MG extended release tablet Take 1 tablet by mouth every morning 90 tablet 3    dapagliflozin (FARXIGA) 10 MG tablet Take 1 tablet by mouth every morning 90 tablet 3    metFORMIN (GLUCOPHAGE-XR) 500 MG extended release tablet Take 2 tablets by mouth in the morning and at bedtime 360 tablet 3    escitalopram (LEXAPRO) 20 MG tablet TAKE 1 TABLET BY MOUTH EVERY DAY 90 tablet 3    lisinopril (PRINIVIL;ZESTRIL) 40 MG tablet Take 1 tablet by mouth daily 90 tablet 3    montelukast (SINGULAIR) 10 MG tablet TAKE 1 TABLET BY MOUTH 1 TIME A DAY 90 tablet 3    rosuvastatin (CRESTOR) 20 MG tablet Take 1 tablet by mouth nightly 90 tablet 3    Semaglutide, 2 MG/DOSE, (OZEMPIC, 2 MG/DOSE,) 8 MG/3ML SOPN Inject 2 mg into the skin every 7 days 9 mL 1    Testosterone (ANDROGEL) 20.25 MG/ACT (1.62%) GEL gel 2 pump on each shoulder qd 3 each 1    glimepiride (AMARYL) 4 MG tablet Take 1 tablet by mouth in the morning and at bedtime 60 tablet 1    blood glucose test strips (AGAMATRIX JAZZ TEST) strip

## 2024-03-01 DIAGNOSIS — E29.1 HYPOGONADISM IN MALE: ICD-10-CM

## 2024-03-04 RX ORDER — TESTOSTERONE 16.2 MG/G
GEL TRANSDERMAL
Qty: 225 G | Refills: 1 | OUTPATIENT
Start: 2024-03-04

## 2024-03-06 ENCOUNTER — PATIENT MESSAGE (OUTPATIENT)
Dept: INTERNAL MEDICINE CLINIC | Age: 49
End: 2024-03-06

## 2024-03-06 DIAGNOSIS — E11.9 TYPE 2 DIABETES MELLITUS WITHOUT COMPLICATION, WITHOUT LONG-TERM CURRENT USE OF INSULIN (HCC): Primary | ICD-10-CM

## 2024-03-06 RX ORDER — GLUCOSAMINE HCL/CHONDROITIN SU 500-400 MG
CAPSULE ORAL
Qty: 300 STRIP | Refills: 3 | Status: SHIPPED | OUTPATIENT
Start: 2024-03-06

## 2024-03-06 NOTE — TELEPHONE ENCOUNTER
From: Garcia Remy  To: Dr. Carmen Franco  Sent: 3/6/2024 12:49 PM EST  Subject: Test strips    Insurance sent me a new meter but [facility] for OneTouch Verio.     Thank you

## 2024-03-07 DIAGNOSIS — E29.1 HYPOGONADISM IN MALE: ICD-10-CM

## 2024-03-07 RX ORDER — TESTOSTERONE 16.2 MG/G
GEL TRANSDERMAL
Qty: 3 EACH | Refills: 1 | OUTPATIENT
Start: 2024-03-07 | End: 2024-06-21

## 2024-03-11 ENCOUNTER — TELEPHONE (OUTPATIENT)
Dept: INTERNAL MEDICINE CLINIC | Age: 49
End: 2024-03-11

## 2024-03-11 DIAGNOSIS — E29.1 HYPOGONADISM IN MALE: ICD-10-CM

## 2024-03-11 RX ORDER — TESTOSTERONE 16.2 MG/G
GEL TRANSDERMAL
Qty: 6 EACH | Refills: 0 | Status: SHIPPED | OUTPATIENT
Start: 2024-03-11 | End: 2024-06-25

## 2024-03-11 NOTE — TELEPHONE ENCOUNTER
[facility] calling,-states that patient is out of refills on testosterone gel. States he was only given a 45 day supply. Requesting a 90 day supply sent today.  LAST REFILL 12-4-23  AMOUNT 3     1 REFILLS  LAST VISIT 2-26-24  NEXT VISIT 5-

## 2024-04-05 ENCOUNTER — TELEPHONE (OUTPATIENT)
Dept: FAMILY MEDICINE CLINIC | Age: 49
End: 2024-04-05

## 2024-04-05 DIAGNOSIS — E11.9 TYPE 2 DIABETES MELLITUS WITHOUT COMPLICATION, WITHOUT LONG-TERM CURRENT USE OF INSULIN (HCC): ICD-10-CM

## 2024-04-05 NOTE — TELEPHONE ENCOUNTER
SUBMITTED PA FOR Ozempic (0.25 or 0.5 MG/DOSE) 2MG/3ML pen-injectors VIA CMM  Key: [id number] STATUS PENDING.      FOLLOW UP DONE DAILY: IF NO RESPONSE IN 3 DAYS WE WILL REFAX FOR STATUS CHECK. IF ANOTHER 3 DAYS GOES BY WITH NO RESPONSE WILL CALL INSURANCE FOR STATUS.

## 2024-04-05 NOTE — TELEPHONE ENCOUNTER
The medication was DENIED;   ? Information regarding your request  The Requested qty is not covered by the plan          If you want an APPEAL; please note in this encounter what new information you would like to APPEAL with.  Once complete route back to PA POOL.    If this requires a response please respond to the pool ( P MHCX PSC MEDICATION PRE-AUTH).      Thank you please advise patient.

## 2024-04-09 DIAGNOSIS — E11.9 TYPE 2 DIABETES MELLITUS WITHOUT COMPLICATION, WITHOUT LONG-TERM CURRENT USE OF INSULIN (HCC): ICD-10-CM

## 2024-04-09 NOTE — TELEPHONE ENCOUNTER
Please appeal Ozempic. Per insurance patient's current rx meets quantity limit of 3 pens per 84 days. Last A1c was 7.8

## 2024-04-09 NOTE — TELEPHONE ENCOUNTER
Per insurance quantity limit for Ozempic 2 mg is   0.11 ml per 1 day   1 prefilled pen per 28 days.

## 2024-04-09 NOTE — TELEPHONE ENCOUNTER
Medication will need to be appealed. 9 ml is equivalent to 3 pens. Rx already shows 84 day supply. Ok to appeal?

## 2024-04-10 NOTE — TELEPHONE ENCOUNTER
APPEAL FAXED TO PLAN. APPEAL LETTER IN CHART       If this requires a response please respond to the pool ( P MHCX PSC MEDICATION PRE-AUTH).      Thank you please advise patient.

## 2024-05-06 DIAGNOSIS — E11.9 TYPE 2 DIABETES MELLITUS WITHOUT COMPLICATION, WITHOUT LONG-TERM CURRENT USE OF INSULIN (HCC): ICD-10-CM

## 2024-05-06 RX ORDER — SEMAGLUTIDE 2.68 MG/ML
INJECTION, SOLUTION SUBCUTANEOUS
Qty: 9 ML | Refills: 3 | OUTPATIENT
Start: 2024-05-06

## 2024-05-07 DIAGNOSIS — E11.9 TYPE 2 DIABETES MELLITUS WITHOUT COMPLICATION, WITHOUT LONG-TERM CURRENT USE OF INSULIN (HCC): ICD-10-CM

## 2024-05-07 RX ORDER — SEMAGLUTIDE 2.68 MG/ML
2 INJECTION, SOLUTION SUBCUTANEOUS
Qty: 9 ML | Refills: 0 | Status: SHIPPED | OUTPATIENT
Start: 2024-05-07

## 2024-05-07 NOTE — TELEPHONE ENCOUNTER
Looks like he is due for a new Rx. We can send it and see if it goes through? Looks like we have not had a response from insurance on the appeal yet.

## 2024-05-08 NOTE — TELEPHONE ENCOUNTER
The medication is APPROVED. Letter will be uploaded upon arrival.    If this requires a response please respond to the pool ( P MHCX PSC MEDICATION PRE-AUTH).      Thank you please advise patient. Called plan @ [phone] SPOKE WITH JORDON Lao# [phone] per representative there is a paid claim at the pharmacy and patient can pick medication up.

## 2024-05-08 NOTE — TELEPHONE ENCOUNTER
Patient notified via [employer] of appeal approval, a new Rx was sent to [facility] 5-7-24 per request.

## 2024-05-13 DIAGNOSIS — E11.9 TYPE 2 DIABETES MELLITUS WITHOUT COMPLICATION, WITHOUT LONG-TERM CURRENT USE OF INSULIN (HCC): ICD-10-CM

## 2024-05-13 RX ORDER — GLIMEPIRIDE 4 MG/1
TABLET ORAL
Qty: 180 TABLET | Refills: 1 | Status: SHIPPED | OUTPATIENT
Start: 2024-05-13

## 2024-05-13 NOTE — TELEPHONE ENCOUNTER
LAST REFILL 02/26/2024  AMOUNT 180     0 REFILLS  LAST VISIT 02/26/2024  NEXT VISIT 05/20/2024

## 2024-05-20 ENCOUNTER — OFFICE VISIT (OUTPATIENT)
Dept: INTERNAL MEDICINE CLINIC | Age: 49
End: 2024-05-20
Payer: COMMERCIAL

## 2024-05-20 VITALS
HEART RATE: 53 BPM | WEIGHT: 276 LBS | HEIGHT: 70 IN | SYSTOLIC BLOOD PRESSURE: 126 MMHG | OXYGEN SATURATION: 96 % | DIASTOLIC BLOOD PRESSURE: 68 MMHG | BODY MASS INDEX: 39.51 KG/M2

## 2024-05-20 DIAGNOSIS — E11.9 TYPE 2 DIABETES MELLITUS WITHOUT COMPLICATION, WITHOUT LONG-TERM CURRENT USE OF INSULIN (HCC): Primary | ICD-10-CM

## 2024-05-20 DIAGNOSIS — N52.1 ERECTILE DYSFUNCTION ASSOCIATED WITH TYPE 2 DIABETES MELLITUS (HCC): ICD-10-CM

## 2024-05-20 DIAGNOSIS — E11.69 ERECTILE DYSFUNCTION ASSOCIATED WITH TYPE 2 DIABETES MELLITUS (HCC): ICD-10-CM

## 2024-05-20 DIAGNOSIS — F32.A ANXIETY AND DEPRESSION: ICD-10-CM

## 2024-05-20 DIAGNOSIS — F51.01 PRIMARY INSOMNIA: ICD-10-CM

## 2024-05-20 DIAGNOSIS — F41.9 ANXIETY AND DEPRESSION: ICD-10-CM

## 2024-05-20 DIAGNOSIS — I10 ESSENTIAL HYPERTENSION: ICD-10-CM

## 2024-05-20 DIAGNOSIS — E78.5 HYPERLIPIDEMIA WITH TARGET LDL LESS THAN 100: ICD-10-CM

## 2024-05-20 DIAGNOSIS — E29.1 HYPOGONADISM IN MALE: ICD-10-CM

## 2024-05-20 LAB — HBA1C MFR BLD: 7.1 %

## 2024-05-20 PROCEDURE — 99214 OFFICE O/P EST MOD 30 MIN: CPT | Performed by: INTERNAL MEDICINE

## 2024-05-20 PROCEDURE — 3074F SYST BP LT 130 MM HG: CPT | Performed by: INTERNAL MEDICINE

## 2024-05-20 PROCEDURE — 3051F HG A1C>EQUAL 7.0%<8.0%: CPT | Performed by: INTERNAL MEDICINE

## 2024-05-20 PROCEDURE — 83036 HEMOGLOBIN GLYCOSYLATED A1C: CPT | Performed by: INTERNAL MEDICINE

## 2024-05-20 PROCEDURE — 3078F DIAST BP <80 MM HG: CPT | Performed by: INTERNAL MEDICINE

## 2024-05-20 RX ORDER — SEMAGLUTIDE 2.68 MG/ML
2 INJECTION, SOLUTION SUBCUTANEOUS
Qty: 9 ML | Refills: 1 | Status: SHIPPED | OUTPATIENT
Start: 2024-05-20

## 2024-05-20 RX ORDER — TESTOSTERONE 16.2 MG/G
GEL TRANSDERMAL
Qty: 6 EACH | Refills: 1 | Status: SHIPPED | OUTPATIENT
Start: 2024-05-20 | End: 2024-09-03

## 2024-05-20 SDOH — ECONOMIC STABILITY: FOOD INSECURITY: WITHIN THE PAST 12 MONTHS, YOU WORRIED THAT YOUR FOOD WOULD RUN OUT BEFORE YOU GOT MONEY TO BUY MORE.: NEVER TRUE

## 2024-05-20 SDOH — ECONOMIC STABILITY: FOOD INSECURITY: WITHIN THE PAST 12 MONTHS, THE FOOD YOU BOUGHT JUST DIDN'T LAST AND YOU DIDN'T HAVE MONEY TO GET MORE.: NEVER TRUE

## 2024-05-20 SDOH — ECONOMIC STABILITY: INCOME INSECURITY: HOW HARD IS IT FOR YOU TO PAY FOR THE VERY BASICS LIKE FOOD, HOUSING, MEDICAL CARE, AND HEATING?: NOT HARD AT ALL

## 2024-05-20 NOTE — PROGRESS NOTES
Garcia Remy  YOB: 1975    Date of Service:  5/20/2024    Chief Complaint:      Chief Complaint   Patient presents with    Diabetes    Hypertension       Assessment/Plan:  Garcia was seen today for diabetes and hypertension.    Diagnoses and all orders for this visit:    Type 2 diabetes mellitus without complication, without long-term current use of insulin (HCC)  -     POCT glycosylated hemoglobin (Hb A1C)  -     semaglutide, 2 MG/DOSE, (OZEMPIC, 2 MG/DOSE,) 8 MG/3ML SOPN sc injection; Inject 2 mg into the skin every 7 days    Hypogonadism in male  -     Testosterone (ANDROGEL) 20.25 MG/ACT (1.62%) GEL gel; 2 pump on each shoulder qd    Hyperlipidemia with target LDL less than 100    Essential hypertension    Anxiety and depression    Primary insomnia    Erectile dysfunction associated with type 2 diabetes mellitus (HCC)    Stable and continue on current medications.    Return Diabetes 9/3.      HPI:  Garcia Remy is a 48 y.o.    Hypogonadalism:  improve fatigue and sleeping better since started on Androgel 1 squirt each shoulder in April.       Treatment Adherence:   Medication compliance: compliant all of the time   Diet compliance: compliant most of the time   Weight trend: decreasing 293#  Current exercise: walks 3 time(s) per week   Barriers: time constraints   Diabetes Mellitus Type 2: Current symptoms/problems include: none. stable on Ozempic 2 mg qweek, Farxiga 10 mg daily, Metformin  mg 2 bid, and been off Amaryl 4 mg bid.  He has lost weight since getting off Actos.  Home blood sugar records: fasting range: No  No change in diet or exercise.  Any episodes of hypoglycemia? no   Eye exam current (within one year): yes   Tobacco history: He reports that he quit smoking about 7 years ago. He has never used smokeless tobacco.   Daily Aspirin? No: will start   Known diabetic complications: none   Hypertension: Home blood pressure monitoring: yes 130/70 on Lisinopril 40 mg qd. He

## 2024-08-12 LAB — DIABETIC RETINOPATHY: NEGATIVE

## 2024-09-03 ENCOUNTER — OFFICE VISIT (OUTPATIENT)
Dept: INTERNAL MEDICINE CLINIC | Age: 49
End: 2024-09-03
Payer: COMMERCIAL

## 2024-09-03 VITALS
SYSTOLIC BLOOD PRESSURE: 126 MMHG | HEIGHT: 70 IN | HEART RATE: 79 BPM | DIASTOLIC BLOOD PRESSURE: 62 MMHG | OXYGEN SATURATION: 95 % | WEIGHT: 279 LBS | BODY MASS INDEX: 39.94 KG/M2

## 2024-09-03 DIAGNOSIS — E11.9 TYPE 2 DIABETES MELLITUS WITHOUT COMPLICATION, WITHOUT LONG-TERM CURRENT USE OF INSULIN (HCC): Primary | ICD-10-CM

## 2024-09-03 DIAGNOSIS — E78.5 HYPERLIPIDEMIA WITH TARGET LDL LESS THAN 100: ICD-10-CM

## 2024-09-03 DIAGNOSIS — I10 ESSENTIAL HYPERTENSION: ICD-10-CM

## 2024-09-03 DIAGNOSIS — E66.01 MORBID OBESITY WITH BMI OF 40.0-44.9, ADULT (HCC): ICD-10-CM

## 2024-09-03 LAB — HBA1C MFR BLD: 7.3 %

## 2024-09-03 PROCEDURE — 3078F DIAST BP <80 MM HG: CPT | Performed by: INTERNAL MEDICINE

## 2024-09-03 PROCEDURE — 3074F SYST BP LT 130 MM HG: CPT | Performed by: INTERNAL MEDICINE

## 2024-09-03 PROCEDURE — 99214 OFFICE O/P EST MOD 30 MIN: CPT | Performed by: INTERNAL MEDICINE

## 2024-09-03 PROCEDURE — 3051F HG A1C>EQUAL 7.0%<8.0%: CPT | Performed by: INTERNAL MEDICINE

## 2024-09-03 PROCEDURE — 83036 HEMOGLOBIN GLYCOSYLATED A1C: CPT | Performed by: INTERNAL MEDICINE

## 2024-09-03 RX ORDER — SEMAGLUTIDE 2.68 MG/ML
2 INJECTION, SOLUTION SUBCUTANEOUS
Qty: 9 ML | Refills: 0 | Status: SHIPPED | OUTPATIENT
Start: 2024-09-03

## 2024-09-03 RX ORDER — LANCETS 33 GAUGE
1 EACH MISCELLANEOUS DAILY
Qty: 100 EACH | Refills: 3 | Status: SHIPPED | OUTPATIENT
Start: 2024-09-03

## 2024-09-03 NOTE — PROGRESS NOTES
Garcia Remy  YOB: 1975    Date of Service:  9/3/2024    Chief Complaint:      Chief Complaint   Patient presents with    Diabetes    Memory Loss     Patient has complained about memory issues. He said just like with the notification for the visit last night he knew it said 8:50 but Somehow his brain was telling him 9:30.        Assessment/Plan:  Garcia \"Lopez\" was seen today for diabetes and memory loss.    Diagnoses and all orders for this visit:    Type 2 diabetes mellitus without complication, without long-term current use of insulin ([facility])  -     POCT glycosylated hemoglobin (Hb A1C)  -     semaglutide, 2 MG/DOSE, (OZEMPIC, 2 MG/DOSE,) 8 MG/3ML SOPN sc injection; Inject 2 mg into the skin every 7 days  -     AgaMatrix Ultra-Thin Lancets MISC; 1 box  by Does not apply route daily Use to test blood sugar once daily.  Decrease amaryl 4 mg 1/2 3AM when he gets up and if he eats in the afternoon, he can take1/2 again, otherwise just take 1 with dinner 4-7 PM.    Hyperlipidemia with target LDL less than 100    Essential hypertension    Morbid obesity with BMI of 40.0-44.9, adult ([facility])    Goals:   -Eat small amounts of food 4-5 times daily  -Avoid high fat and high sugar foods  -Include protein with all meals and snacks  -Avoid carbonation and caffeine  -Avoid calorie containing beverages  -Increase physical activity as tolerated      Return Fasting PE and diabetes 11/18.      HPI:  Garcia Remy is a 48 y.o.       Hypogonadalism:  improve fatigue and sleeping better since started on Androgel 1 squirt each shoulder in April.       Treatment Adherence:   Medication compliance: compliant all of the time   Diet compliance: compliant most of the time   Weight trend: decreasing 293#  Current exercise: walks 3 time(s) per week   Barriers: time constraints   Diabetes Mellitus Type 2: Current symptoms/problems include: none. stable on Ozempic 2 mg qweek, Farxiga 10 mg daily, Metformin  mg 2 bid,

## 2024-10-30 DIAGNOSIS — E11.9 TYPE 2 DIABETES MELLITUS WITHOUT COMPLICATION, WITHOUT LONG-TERM CURRENT USE OF INSULIN (HCC): ICD-10-CM

## 2024-10-30 RX ORDER — GLIMEPIRIDE 4 MG/1
TABLET ORAL
Qty: 180 TABLET | Refills: 3 | OUTPATIENT
Start: 2024-10-30

## 2024-11-08 DIAGNOSIS — F41.9 ANXIETY AND DEPRESSION: ICD-10-CM

## 2024-11-08 DIAGNOSIS — J45.909 ASTHMA DUE TO SEASONAL ALLERGIES: ICD-10-CM

## 2024-11-08 DIAGNOSIS — E11.9 TYPE 2 DIABETES MELLITUS WITHOUT COMPLICATION, WITHOUT LONG-TERM CURRENT USE OF INSULIN (HCC): ICD-10-CM

## 2024-11-08 DIAGNOSIS — I10 ESSENTIAL HYPERTENSION: ICD-10-CM

## 2024-11-08 DIAGNOSIS — E78.5 HYPERLIPIDEMIA WITH TARGET LDL LESS THAN 100: ICD-10-CM

## 2024-11-08 DIAGNOSIS — F32.A ANXIETY AND DEPRESSION: ICD-10-CM

## 2024-11-11 RX ORDER — ESCITALOPRAM OXALATE 20 MG/1
TABLET ORAL
Qty: 90 TABLET | Refills: 3 | OUTPATIENT
Start: 2024-11-11

## 2024-11-11 RX ORDER — BUPROPION HYDROCHLORIDE 300 MG/1
300 TABLET ORAL EVERY MORNING
Qty: 90 TABLET | Refills: 3 | OUTPATIENT
Start: 2024-11-11

## 2024-11-11 RX ORDER — LISINOPRIL 40 MG/1
40 TABLET ORAL DAILY
Qty: 90 TABLET | Refills: 3 | OUTPATIENT
Start: 2024-11-11

## 2024-11-11 RX ORDER — DAPAGLIFLOZIN 10 MG/1
10 TABLET, FILM COATED ORAL EVERY MORNING
Qty: 90 TABLET | Refills: 3 | OUTPATIENT
Start: 2024-11-11

## 2024-11-11 RX ORDER — ROSUVASTATIN CALCIUM 20 MG/1
20 TABLET, COATED ORAL NIGHTLY
Qty: 90 TABLET | Refills: 3 | OUTPATIENT
Start: 2024-11-11

## 2024-11-11 RX ORDER — MONTELUKAST SODIUM 10 MG/1
TABLET ORAL
Qty: 90 TABLET | Refills: 3 | OUTPATIENT
Start: 2024-11-11

## 2024-11-11 RX ORDER — METFORMIN HYDROCHLORIDE 500 MG/1
TABLET, EXTENDED RELEASE ORAL
Qty: 360 TABLET | Refills: 3 | OUTPATIENT
Start: 2024-11-11

## 2024-11-18 ENCOUNTER — OFFICE VISIT (OUTPATIENT)
Dept: INTERNAL MEDICINE CLINIC | Age: 49
End: 2024-11-18

## 2024-11-18 VITALS
SYSTOLIC BLOOD PRESSURE: 108 MMHG | DIASTOLIC BLOOD PRESSURE: 64 MMHG | OXYGEN SATURATION: 98 % | HEART RATE: 76 BPM | HEIGHT: 70 IN | WEIGHT: 283 LBS | BODY MASS INDEX: 40.52 KG/M2

## 2024-11-18 DIAGNOSIS — I10 ESSENTIAL HYPERTENSION: ICD-10-CM

## 2024-11-18 DIAGNOSIS — Z12.5 SCREENING FOR PROSTATE CANCER: ICD-10-CM

## 2024-11-18 DIAGNOSIS — E78.5 HYPERLIPIDEMIA WITH TARGET LDL LESS THAN 100: ICD-10-CM

## 2024-11-18 DIAGNOSIS — J45.909 ASTHMA DUE TO SEASONAL ALLERGIES: ICD-10-CM

## 2024-11-18 DIAGNOSIS — F32.A ANXIETY AND DEPRESSION: ICD-10-CM

## 2024-11-18 DIAGNOSIS — Z23 NEED FOR IMMUNIZATION AGAINST INFLUENZA: ICD-10-CM

## 2024-11-18 DIAGNOSIS — E29.1 HYPOGONADISM IN MALE: ICD-10-CM

## 2024-11-18 DIAGNOSIS — Z00.00 ENCOUNTER FOR WELL ADULT EXAM WITHOUT ABNORMAL FINDINGS: Primary | ICD-10-CM

## 2024-11-18 DIAGNOSIS — E11.9 TYPE 2 DIABETES MELLITUS WITHOUT COMPLICATION, WITHOUT LONG-TERM CURRENT USE OF INSULIN (HCC): ICD-10-CM

## 2024-11-18 DIAGNOSIS — F41.9 ANXIETY AND DEPRESSION: ICD-10-CM

## 2024-11-18 LAB — HBA1C MFR BLD: 6.6 %

## 2024-11-18 RX ORDER — LISINOPRIL 40 MG/1
40 TABLET ORAL DAILY
Qty: 90 TABLET | Refills: 3 | Status: SHIPPED | OUTPATIENT
Start: 2024-11-18

## 2024-11-18 RX ORDER — MONTELUKAST SODIUM 10 MG/1
TABLET ORAL
Qty: 90 TABLET | Refills: 3 | Status: SHIPPED | OUTPATIENT
Start: 2024-11-18

## 2024-11-18 RX ORDER — SEMAGLUTIDE 2.68 MG/ML
2 INJECTION, SOLUTION SUBCUTANEOUS
Qty: 9 ML | Refills: 1 | Status: SHIPPED | OUTPATIENT
Start: 2024-11-18

## 2024-11-18 RX ORDER — ESCITALOPRAM OXALATE 20 MG/1
TABLET ORAL
Qty: 90 TABLET | Refills: 3 | Status: SHIPPED | OUTPATIENT
Start: 2024-11-18

## 2024-11-18 RX ORDER — GLIMEPIRIDE 4 MG/1
4 TABLET ORAL 2 TIMES DAILY
Qty: 180 TABLET | Refills: 3 | Status: SHIPPED | OUTPATIENT
Start: 2024-11-18

## 2024-11-18 RX ORDER — METFORMIN HYDROCHLORIDE 500 MG/1
1000 TABLET, EXTENDED RELEASE ORAL 2 TIMES DAILY
Qty: 360 TABLET | Refills: 3 | Status: SHIPPED | OUTPATIENT
Start: 2024-11-18

## 2024-11-18 RX ORDER — ROSUVASTATIN CALCIUM 20 MG/1
20 TABLET, COATED ORAL NIGHTLY
Qty: 90 TABLET | Refills: 3 | Status: SHIPPED | OUTPATIENT
Start: 2024-11-18

## 2024-11-18 RX ORDER — BUPROPION HYDROCHLORIDE 300 MG/1
300 TABLET ORAL EVERY MORNING
Qty: 90 TABLET | Refills: 3 | Status: SHIPPED | OUTPATIENT
Start: 2024-11-18

## 2024-11-18 RX ORDER — DAPAGLIFLOZIN 10 MG/1
10 TABLET, FILM COATED ORAL EVERY MORNING
Qty: 90 TABLET | Refills: 3 | Status: SHIPPED | OUTPATIENT
Start: 2024-11-18

## 2024-11-18 NOTE — PROGRESS NOTES
DTaP/Tdap/Td vaccine (3 - Td or Tdap) 10/27/2030    Pneumococcal 0-64 years Vaccine (3 of 3 - PPSV23 or PCV20) 11/20/2038    Hepatitis B vaccine  Completed    Shingles vaccine  Completed    COVID-19 Vaccine  Completed    HIV screen  Addressed    Hepatitis A vaccine  Aged Out    Hib vaccine  Aged Out    Polio vaccine  Aged Out    Meningococcal (ACWY) vaccine  Aged Out    Hepatitis C screen  Discontinued        Recommendations for Preventive Services Due: see orders and patient instructions/AVS.

## 2024-11-19 LAB
ALBUMIN SERPL-MCNC: 4.6 G/DL (ref 3.4–5)
ALBUMIN/GLOB SERPL: 1.7 {RATIO} (ref 1.1–2.2)
ALP SERPL-CCNC: 80 U/L (ref 40–129)
ALT SERPL-CCNC: 28 U/L (ref 10–40)
ANION GAP SERPL CALCULATED.3IONS-SCNC: 12 MMOL/L (ref 3–16)
AST SERPL-CCNC: 20 U/L (ref 15–37)
BASOPHILS # BLD: 0.1 K/UL (ref 0–0.2)
BASOPHILS NFR BLD: 1.1 %
BILIRUB SERPL-MCNC: 0.6 MG/DL (ref 0–1)
BUN SERPL-MCNC: 14 MG/DL (ref 7–20)
CALCIUM SERPL-MCNC: 10 MG/DL (ref 8.3–10.6)
CHLORIDE SERPL-SCNC: 101 MMOL/L (ref 99–110)
CHOLEST SERPL-MCNC: 119 MG/DL (ref 0–199)
CO2 SERPL-SCNC: 27 MMOL/L (ref 21–32)
CREAT SERPL-MCNC: 1 MG/DL (ref 0.9–1.3)
CREAT UR-MCNC: 143 MG/DL (ref 39–259)
DEPRECATED RDW RBC AUTO: 17.3 % (ref 12.4–15.4)
EOSINOPHIL # BLD: 0.1 K/UL (ref 0–0.6)
EOSINOPHIL NFR BLD: 1.6 %
GFR SERPLBLD CREATININE-BSD FMLA CKD-EPI: >90 ML/MIN/{1.73_M2}
GLUCOSE SERPL-MCNC: 62 MG/DL (ref 70–99)
HCT VFR BLD AUTO: 38.6 % (ref 40.5–52.5)
HDLC SERPL-MCNC: 53 MG/DL (ref 40–60)
HGB BLD-MCNC: 12.3 G/DL (ref 13.5–17.5)
LDLC SERPL CALC-MCNC: 44 MG/DL
LYMPHOCYTES # BLD: 1.6 K/UL (ref 1–5.1)
LYMPHOCYTES NFR BLD: 20.7 %
MCH RBC QN AUTO: 25.8 PG (ref 26–34)
MCHC RBC AUTO-ENTMCNC: 32 G/DL (ref 31–36)
MCV RBC AUTO: 80.9 FL (ref 80–100)
MICROALBUMIN UR DL<=1MG/L-MCNC: 1.5 MG/DL
MICROALBUMIN/CREAT UR: 10.5 MG/G (ref 0–30)
MONOCYTES # BLD: 0.5 K/UL (ref 0–1.3)
MONOCYTES NFR BLD: 7 %
NEUTROPHILS # BLD: 5.3 K/UL (ref 1.7–7.7)
NEUTROPHILS NFR BLD: 69.6 %
PLATELET # BLD AUTO: 391 K/UL (ref 135–450)
PMV BLD AUTO: 7.7 FL (ref 5–10.5)
POTASSIUM SERPL-SCNC: 4.3 MMOL/L (ref 3.5–5.1)
PROT SERPL-MCNC: 7.3 G/DL (ref 6.4–8.2)
PSA SERPL DL<=0.01 NG/ML-MCNC: 0.93 NG/ML (ref 0–4)
RBC # BLD AUTO: 4.77 M/UL (ref 4.2–5.9)
SODIUM SERPL-SCNC: 140 MMOL/L (ref 136–145)
TRIGL SERPL-MCNC: 108 MG/DL (ref 0–150)
VLDLC SERPL CALC-MCNC: 22 MG/DL
WBC # BLD AUTO: 7.6 K/UL (ref 4–11)

## 2024-11-20 LAB — TESTOST SERPL-MCNC: 620 NG/DL (ref 193–740)

## 2024-11-22 ENCOUNTER — TELEPHONE (OUTPATIENT)
Dept: ADMINISTRATIVE | Age: 49
End: 2024-11-22

## 2024-11-22 NOTE — TELEPHONE ENCOUNTER
Submitted PA for Ozempic (2 MG/DOSE) 8MG/3ML pen-injectors  Via CMM Key: [id number] STATUS: MESSAGE FROM PLAN     The Requested qty is not covered by the plan     Called plan  [phone]. Spoke with Beverly Wilkins Medication is approved with a paid claim     If this requires a response please respond to the pool ( P MHCX PSC MEDICATION PRE-AUTH).      Thank you please advise patient.   
Elda Lawson

## 2024-12-23 DIAGNOSIS — E11.9 TYPE 2 DIABETES MELLITUS WITHOUT COMPLICATION, WITHOUT LONG-TERM CURRENT USE OF INSULIN (HCC): ICD-10-CM

## 2024-12-30 RX ORDER — SEMAGLUTIDE 2.68 MG/ML
INJECTION, SOLUTION SUBCUTANEOUS
Qty: 9 ML | Refills: 3 | OUTPATIENT
Start: 2024-12-30

## 2025-01-14 ENCOUNTER — PATIENT MESSAGE (OUTPATIENT)
Dept: INTERNAL MEDICINE CLINIC | Age: 50
End: 2025-01-14

## 2025-01-15 DIAGNOSIS — E78.5 HYPERLIPIDEMIA WITH TARGET LDL LESS THAN 100: ICD-10-CM

## 2025-01-15 DIAGNOSIS — J45.909 ASTHMA DUE TO SEASONAL ALLERGIES: ICD-10-CM

## 2025-01-15 DIAGNOSIS — E11.9 TYPE 2 DIABETES MELLITUS WITHOUT COMPLICATION, WITHOUT LONG-TERM CURRENT USE OF INSULIN (HCC): ICD-10-CM

## 2025-01-15 DIAGNOSIS — F32.A ANXIETY AND DEPRESSION: ICD-10-CM

## 2025-01-15 DIAGNOSIS — E29.1 HYPOGONADISM IN MALE: ICD-10-CM

## 2025-01-15 DIAGNOSIS — I10 ESSENTIAL HYPERTENSION: ICD-10-CM

## 2025-01-15 DIAGNOSIS — F41.9 ANXIETY AND DEPRESSION: ICD-10-CM

## 2025-01-15 RX ORDER — BUPROPION HYDROCHLORIDE 300 MG/1
300 TABLET ORAL EVERY MORNING
Qty: 90 TABLET | Refills: 3 | Status: CANCELLED | OUTPATIENT
Start: 2025-01-15

## 2025-01-15 RX ORDER — ROSUVASTATIN CALCIUM 20 MG/1
20 TABLET, COATED ORAL NIGHTLY
Qty: 90 TABLET | Refills: 3 | Status: SHIPPED | OUTPATIENT
Start: 2025-01-15

## 2025-01-15 RX ORDER — DAPAGLIFLOZIN 10 MG/1
10 TABLET, FILM COATED ORAL EVERY MORNING
Qty: 90 TABLET | Refills: 3 | Status: SHIPPED | OUTPATIENT
Start: 2025-01-15

## 2025-01-15 RX ORDER — GLIMEPIRIDE 4 MG/1
4 TABLET ORAL 2 TIMES DAILY
Qty: 180 TABLET | Refills: 3 | Status: CANCELLED | OUTPATIENT
Start: 2025-01-15

## 2025-01-15 RX ORDER — MONTELUKAST SODIUM 10 MG/1
TABLET ORAL
Qty: 90 TABLET | Refills: 3 | Status: CANCELLED | OUTPATIENT
Start: 2025-01-15

## 2025-01-15 RX ORDER — METFORMIN HYDROCHLORIDE 500 MG/1
1000 TABLET, EXTENDED RELEASE ORAL 2 TIMES DAILY
Qty: 360 TABLET | Refills: 3 | Status: SHIPPED | OUTPATIENT
Start: 2025-01-15

## 2025-01-15 RX ORDER — SEMAGLUTIDE 2.68 MG/ML
2 INJECTION, SOLUTION SUBCUTANEOUS
Qty: 9 ML | Refills: 1 | Status: CANCELLED | OUTPATIENT
Start: 2025-01-15

## 2025-01-15 RX ORDER — BUPROPION HYDROCHLORIDE 300 MG/1
300 TABLET ORAL EVERY MORNING
Qty: 90 TABLET | Refills: 3 | Status: SHIPPED | OUTPATIENT
Start: 2025-01-15

## 2025-01-15 RX ORDER — MONTELUKAST SODIUM 10 MG/1
TABLET ORAL
Qty: 90 TABLET | Refills: 3 | Status: SHIPPED | OUTPATIENT
Start: 2025-01-15

## 2025-01-15 RX ORDER — ESCITALOPRAM OXALATE 20 MG/1
TABLET ORAL
Qty: 90 TABLET | Refills: 3 | Status: CANCELLED | OUTPATIENT
Start: 2025-01-15

## 2025-01-15 RX ORDER — ESCITALOPRAM OXALATE 20 MG/1
TABLET ORAL
Qty: 90 TABLET | Refills: 3 | Status: SHIPPED | OUTPATIENT
Start: 2025-01-15

## 2025-01-15 RX ORDER — SEMAGLUTIDE 2.68 MG/ML
2 INJECTION, SOLUTION SUBCUTANEOUS
Qty: 9 ML | Refills: 1 | Status: SHIPPED | OUTPATIENT
Start: 2025-01-15

## 2025-01-15 RX ORDER — ROSUVASTATIN CALCIUM 20 MG/1
20 TABLET, COATED ORAL NIGHTLY
Qty: 90 TABLET | Refills: 3 | Status: CANCELLED | OUTPATIENT
Start: 2025-01-15

## 2025-01-15 RX ORDER — LISINOPRIL 40 MG/1
40 TABLET ORAL DAILY
Qty: 90 TABLET | Refills: 3 | Status: CANCELLED | OUTPATIENT
Start: 2025-01-15

## 2025-01-15 RX ORDER — TESTOSTERONE 1.62 MG/G
GEL TRANSDERMAL
Qty: 6 EACH | Refills: 1 | Status: CANCELLED | OUTPATIENT
Start: 2025-01-15 | End: 2025-05-01

## 2025-01-15 RX ORDER — LISINOPRIL 40 MG/1
40 TABLET ORAL DAILY
Qty: 90 TABLET | Refills: 3 | Status: SHIPPED | OUTPATIENT
Start: 2025-01-15

## 2025-01-15 RX ORDER — GLIMEPIRIDE 4 MG/1
4 TABLET ORAL 2 TIMES DAILY
Qty: 180 TABLET | Refills: 3 | Status: SHIPPED | OUTPATIENT
Start: 2025-01-15

## 2025-01-15 RX ORDER — TESTOSTERONE 1.62 MG/G
GEL TRANSDERMAL
Qty: 6 EACH | Refills: 1 | Status: SHIPPED | OUTPATIENT
Start: 2025-01-15 | End: 2025-05-01

## 2025-01-15 RX ORDER — METFORMIN HYDROCHLORIDE 500 MG/1
1000 TABLET, EXTENDED RELEASE ORAL 2 TIMES DAILY
Qty: 360 TABLET | Refills: 3 | Status: CANCELLED | OUTPATIENT
Start: 2025-01-15

## 2025-01-15 RX ORDER — DAPAGLIFLOZIN 10 MG/1
10 TABLET, FILM COATED ORAL EVERY MORNING
Qty: 90 TABLET | Refills: 3 | Status: CANCELLED | OUTPATIENT
Start: 2025-01-15

## 2025-01-15 NOTE — TELEPHONE ENCOUNTER
All meds pended for refills that were [facility].  LM to confirm [facility] pharmacy (there are many to choose from).

## 2025-01-15 NOTE — TELEPHONE ENCOUNTER
Patient has new mail order company, requesting new refills to be sent prior to appt 2-.  LAST REFILL 11-  AMOUNT 90     3 REFILLS  LAST VISIT 11-18-24  NEXT VISIT 2-

## 2025-01-29 ENCOUNTER — TELEPHONE (OUTPATIENT)
Dept: INTERNAL MEDICINE CLINIC | Age: 50
End: 2025-01-29

## 2025-01-30 NOTE — TELEPHONE ENCOUNTER
Submitted PA for Dapagliflozin Propanediol 10MG tablets   Via CMM Key: [id number]  STATUS: MESSAGE FROM PLAN     Outcome    The member benefit does not include pharmacy drug coverage. Eligible drugs may be covered under the medical benefit.   Please reach out to patient for pharmacy benefits.   Used BCBS in chart [id number]     If this requires a response please respond to the pool ( P MHCX PSC MEDICATION PRE-AUTH).      Thank you please advise patient.

## 2025-02-03 ENCOUNTER — TELEPHONE (OUTPATIENT)
Dept: INTERNAL MEDICINE CLINIC | Age: 50
End: 2025-02-03

## 2025-02-03 ENCOUNTER — PATIENT MESSAGE (OUTPATIENT)
Dept: INTERNAL MEDICINE CLINIC | Age: 50
End: 2025-02-03

## 2025-02-03 NOTE — TELEPHONE ENCOUNTER
Called plan  spoke with Carla , they are showing that the BCBS insurance in [facility] is not active.     Member ID [id number]      Please reach out to patient and have him reach out to member services on the back of his insurance card.       If this requires a response please respond to the pool ( P MHCX PSC MEDICATION PRE-AUTH).      Thank you please advise patient.

## 2025-02-03 NOTE — TELEPHONE ENCOUNTER
Received new insurance cards via [employer].   New member ID and Rx coverage scanned into chart.  Will forward to PA dept.  (See last encounter/PA note)  Thank You!

## 2025-02-03 NOTE — TELEPHONE ENCOUNTER
Will double check coverage we have in the system. Patient may not have updated, and it could be going to the wrong place.

## 2025-02-03 NOTE — TELEPHONE ENCOUNTER
Submitted PA for Dapagliflozin Propanediol 10MG tablets   Via [facility] Key: [id number]  STATUS: PENDING.    Follow up done daily; if no decision with in three days we will refax.  If another three days goes by with no decision will call the insurance for status.

## 2025-02-04 NOTE — TELEPHONE ENCOUNTER
Received PA form from [employer] to be completed for Ozempic prior authorization. Scanned and forwarded to PA dept.

## 2025-02-04 NOTE — TELEPHONE ENCOUNTER
The medication is APPROVED.    2/3/25-2/3/26    If this requires a response please respond to the pool ( P MHCX PSC MEDICATION PRE-AUTH).      Thank you please advise patient.

## 2025-02-04 NOTE — TELEPHONE ENCOUNTER
[facility] called to check status on PA. They are still unable to fill.  Left voicemail with # [phone] to follow up or need form for PA

## 2025-02-06 ENCOUNTER — TELEPHONE (OUTPATIENT)
Dept: ADMINISTRATIVE | Age: 50
End: 2025-02-06

## 2025-02-06 DIAGNOSIS — E11.9 TYPE 2 DIABETES MELLITUS WITHOUT COMPLICATION, WITHOUT LONG-TERM CURRENT USE OF INSULIN (HCC): ICD-10-CM

## 2025-02-06 RX ORDER — BLOOD SUGAR DIAGNOSTIC
STRIP MISCELLANEOUS
Qty: 300 STRIP | Refills: 3 | OUTPATIENT
Start: 2025-02-06

## 2025-02-06 NOTE — TELEPHONE ENCOUNTER
Submitted PA for Ozempic (2 MG/DOSE) 8MG/3ML pen-injectors  Via CMM Key: [id number] STATUS: PENDING.    Follow up done daily; if no decision with in three days we will refax.  If another three days goes by with no decision will call the insurance for status.

## 2025-02-07 NOTE — TELEPHONE ENCOUNTER
The medication is APPROVED.      2-6-25-2/6/26    If this requires a response please respond to the pool ( P MHCX PSC MEDICATION PRE-AUTH).      Thank you please advise patient.

## 2025-02-20 ENCOUNTER — TELEMEDICINE (OUTPATIENT)
Dept: INTERNAL MEDICINE CLINIC | Age: 50
End: 2025-02-20

## 2025-02-20 DIAGNOSIS — I10 ESSENTIAL HYPERTENSION: ICD-10-CM

## 2025-02-20 DIAGNOSIS — N52.1 ERECTILE DYSFUNCTION ASSOCIATED WITH TYPE 2 DIABETES MELLITUS (HCC): ICD-10-CM

## 2025-02-20 DIAGNOSIS — E29.1 HYPOGONADISM IN MALE: ICD-10-CM

## 2025-02-20 DIAGNOSIS — E11.9 TYPE 2 DIABETES MELLITUS WITHOUT COMPLICATION, WITHOUT LONG-TERM CURRENT USE OF INSULIN (HCC): Primary | ICD-10-CM

## 2025-02-20 DIAGNOSIS — E11.69 ERECTILE DYSFUNCTION ASSOCIATED WITH TYPE 2 DIABETES MELLITUS (HCC): ICD-10-CM

## 2025-02-20 DIAGNOSIS — E66.01 MORBID OBESITY WITH BMI OF 40.0-44.9, ADULT: ICD-10-CM

## 2025-02-20 SDOH — ECONOMIC STABILITY: FOOD INSECURITY: WITHIN THE PAST 12 MONTHS, YOU WORRIED THAT YOUR FOOD WOULD RUN OUT BEFORE YOU GOT MONEY TO BUY MORE.: NEVER TRUE

## 2025-02-20 SDOH — ECONOMIC STABILITY: TRANSPORTATION INSECURITY
IN THE PAST 12 MONTHS, HAS THE LACK OF TRANSPORTATION KEPT YOU FROM MEDICAL APPOINTMENTS OR FROM GETTING MEDICATIONS?: NO

## 2025-02-20 SDOH — ECONOMIC STABILITY: INCOME INSECURITY: IN THE LAST 12 MONTHS, WAS THERE A TIME WHEN YOU WERE NOT ABLE TO PAY THE MORTGAGE OR RENT ON TIME?: NO

## 2025-02-20 SDOH — ECONOMIC STABILITY: FOOD INSECURITY: WITHIN THE PAST 12 MONTHS, THE FOOD YOU BOUGHT JUST DIDN'T LAST AND YOU DIDN'T HAVE MONEY TO GET MORE.: NEVER TRUE

## 2025-02-20 NOTE — PROGRESS NOTES
Patient was evaluated through a synchronous (real-time) video encounter. Patient identification was verified at the start of the visit. She (or guardian if applicable) is aware that this is a billable service, which includes applicable co-pays. This visit was conducted with the patient's (and/or legal guardian's) verbal consent. She has not had a related appointment within my department in the past 7 days or scheduled within the next 24 hours.   The patient was located at Home: [address] [place] [id number].  The provider was located at Home (Appt Dept State): [place].    Date of Service:  2/20/2025    Chief Complaint:      Chief Complaint   Patient presents with    Hypogonadism    Diabetes    Hypertension       Assessment/Plan:    Garcia \"Lopez\" was seen today for hypogonadism, diabetes and hypertension.    Diagnoses and all orders for this visit:    Type 2 diabetes mellitus without complication, without long-term current use of insulin (HCC)  Patient to monitor sugar before breakfast and before dinner since he decrease amaryl 4 mg daily    Erectile dysfunction associated with type 2 diabetes mellitus (HCC)  Stable and continue on current treatment    Hypogonadism in male  Stable and continue on current treatment    Essential hypertension  Monitor blood pressure since losing weight    Morbid obesity with BMI of 40.0-44.9, adult  Goals:   -Cut back on portion size and carbohydrate intake  -Eat small amounts of food   -Avoid high fat and high sugar foods  -Include protein with all meals and snacks  -Avoid carbonation and caffeine  -Avoid calorie containing beverages  -Increase physical activity as tolerated      Return Diabetes, BP, testosterone..6/2.      HPI:  Garcia Remy is a 49 y.o.      Hypogonadalism:  improve fatigue and sleeping better since started on Androgel 2 squirt each shoulder in April.       Treatment Adherence:   Medication compliance: compliant all of the time   Diet compliance: compliant

## 2025-04-10 ENCOUNTER — TELEPHONE (OUTPATIENT)
Dept: INTERNAL MEDICINE CLINIC | Age: 50
End: 2025-04-10

## 2025-04-10 NOTE — TELEPHONE ENCOUNTER
Left message for patient regarding the upcoming [facility] of Dr. Franco. Left information regarding scheduling new patient appointment at [facility] with Dr. Chew or Dr. Mcclure if patient is interested.

## 2025-06-09 ENCOUNTER — OFFICE VISIT (OUTPATIENT)
Dept: INTERNAL MEDICINE CLINIC | Age: 50
End: 2025-06-09
Payer: COMMERCIAL

## 2025-06-09 VITALS
WEIGHT: 283 LBS | HEART RATE: 74 BPM | BODY MASS INDEX: 40.61 KG/M2 | OXYGEN SATURATION: 95 % | DIASTOLIC BLOOD PRESSURE: 76 MMHG | SYSTOLIC BLOOD PRESSURE: 116 MMHG

## 2025-06-09 DIAGNOSIS — G47.33 OSA (OBSTRUCTIVE SLEEP APNEA): ICD-10-CM

## 2025-06-09 DIAGNOSIS — Z13.29 THYROID DISORDER SCREEN: ICD-10-CM

## 2025-06-09 DIAGNOSIS — E11.9 TYPE 2 DIABETES MELLITUS WITHOUT COMPLICATION, WITHOUT LONG-TERM CURRENT USE OF INSULIN (HCC): ICD-10-CM

## 2025-06-09 DIAGNOSIS — I10 ESSENTIAL HYPERTENSION: ICD-10-CM

## 2025-06-09 DIAGNOSIS — F32.A ANXIETY AND DEPRESSION: ICD-10-CM

## 2025-06-09 DIAGNOSIS — E78.5 HYPERLIPIDEMIA WITH TARGET LDL LESS THAN 100: ICD-10-CM

## 2025-06-09 DIAGNOSIS — F41.9 ANXIETY AND DEPRESSION: ICD-10-CM

## 2025-06-09 DIAGNOSIS — E29.1 HYPOGONADISM IN MALE: ICD-10-CM

## 2025-06-09 DIAGNOSIS — I10 ESSENTIAL HYPERTENSION: Primary | ICD-10-CM

## 2025-06-09 DIAGNOSIS — J45.909 ASTHMA DUE TO SEASONAL ALLERGIES: ICD-10-CM

## 2025-06-09 LAB
ALBUMIN SERPL-MCNC: 4.5 G/DL (ref 3.4–5)
ALBUMIN/GLOB SERPL: 1.7 {RATIO} (ref 1.1–2.2)
ALP SERPL-CCNC: 74 U/L (ref 40–129)
ALT SERPL-CCNC: 30 U/L (ref 10–40)
ANION GAP SERPL CALCULATED.3IONS-SCNC: 9 MMOL/L (ref 3–16)
AST SERPL-CCNC: 19 U/L (ref 15–37)
BASOPHILS # BLD: 0 K/UL (ref 0–0.2)
BASOPHILS NFR BLD: 0.5 %
BILIRUB SERPL-MCNC: 0.6 MG/DL (ref 0–1)
BUN SERPL-MCNC: 16 MG/DL (ref 7–20)
CALCIUM SERPL-MCNC: 9.5 MG/DL (ref 8.3–10.6)
CHLORIDE SERPL-SCNC: 101 MMOL/L (ref 99–110)
CHOLEST SERPL-MCNC: 144 MG/DL (ref 0–199)
CO2 SERPL-SCNC: 28 MMOL/L (ref 21–32)
CREAT SERPL-MCNC: 0.9 MG/DL (ref 0.9–1.3)
DEPRECATED RDW RBC AUTO: 14 % (ref 12.4–15.4)
EOSINOPHIL # BLD: 0.2 K/UL (ref 0–0.6)
EOSINOPHIL NFR BLD: 5.5 %
EST. AVERAGE GLUCOSE BLD GHB EST-MCNC: 142.7 MG/DL
GFR SERPLBLD CREATININE-BSD FMLA CKD-EPI: >90 ML/MIN/{1.73_M2}
GLUCOSE SERPL-MCNC: 133 MG/DL (ref 70–99)
HBA1C MFR BLD: 6.6 %
HCT VFR BLD AUTO: 40.7 % (ref 40.5–52.5)
HDLC SERPL-MCNC: 64 MG/DL (ref 40–60)
HGB BLD-MCNC: 13.3 G/DL (ref 13.5–17.5)
LDLC SERPL CALC-MCNC: 61 MG/DL
LYMPHOCYTES # BLD: 1.1 K/UL (ref 1–5.1)
LYMPHOCYTES NFR BLD: 25.1 %
MCH RBC QN AUTO: 27.8 PG (ref 26–34)
MCHC RBC AUTO-ENTMCNC: 32.7 G/DL (ref 31–36)
MCV RBC AUTO: 85 FL (ref 80–100)
MONOCYTES # BLD: 0.3 K/UL (ref 0–1.3)
MONOCYTES NFR BLD: 7.4 %
NEUTROPHILS # BLD: 2.7 K/UL (ref 1.7–7.7)
NEUTROPHILS NFR BLD: 61.5 %
PLATELET # BLD AUTO: 315 K/UL (ref 135–450)
PMV BLD AUTO: 7.6 FL (ref 5–10.5)
POTASSIUM SERPL-SCNC: 5.3 MMOL/L (ref 3.5–5.1)
PROT SERPL-MCNC: 7.2 G/DL (ref 6.4–8.2)
RBC # BLD AUTO: 4.78 M/UL (ref 4.2–5.9)
SODIUM SERPL-SCNC: 138 MMOL/L (ref 136–145)
TRIGL SERPL-MCNC: 95 MG/DL (ref 0–150)
TSH SERPL DL<=0.005 MIU/L-ACNC: 1.3 UIU/ML (ref 0.27–4.2)
VLDLC SERPL CALC-MCNC: 19 MG/DL
WBC # BLD AUTO: 4.5 K/UL (ref 4–11)

## 2025-06-09 PROCEDURE — 3078F DIAST BP <80 MM HG: CPT

## 2025-06-09 PROCEDURE — 3074F SYST BP LT 130 MM HG: CPT

## 2025-06-09 PROCEDURE — 99214 OFFICE O/P EST MOD 30 MIN: CPT

## 2025-06-09 RX ORDER — FOLIC ACID 1 MG/1
1 TABLET ORAL DAILY
COMMUNITY

## 2025-06-09 RX ORDER — DAPAGLIFLOZIN 10 MG/1
10 TABLET, FILM COATED ORAL EVERY MORNING
Qty: 90 TABLET | Refills: 1 | Status: SHIPPED | OUTPATIENT
Start: 2025-06-09

## 2025-06-09 RX ORDER — UBIDECARENONE 75 MG
50 CAPSULE ORAL DAILY
COMMUNITY

## 2025-06-09 RX ORDER — TESTOSTERONE 1.62 MG/G
GEL TRANSDERMAL
Qty: 6 EACH | Refills: 1 | Status: SHIPPED | OUTPATIENT
Start: 2025-06-09 | End: 2025-09-23

## 2025-06-09 SDOH — ECONOMIC STABILITY: FOOD INSECURITY: WITHIN THE PAST 12 MONTHS, THE FOOD YOU BOUGHT JUST DIDN'T LAST AND YOU DIDN'T HAVE MONEY TO GET MORE.: NEVER TRUE

## 2025-06-09 SDOH — ECONOMIC STABILITY: FOOD INSECURITY: WITHIN THE PAST 12 MONTHS, YOU WORRIED THAT YOUR FOOD WOULD RUN OUT BEFORE YOU GOT MONEY TO BUY MORE.: NEVER TRUE

## 2025-06-09 ASSESSMENT — PATIENT HEALTH QUESTIONNAIRE - PHQ9
2. FEELING DOWN, DEPRESSED OR HOPELESS: SEVERAL DAYS
SUM OF ALL RESPONSES TO PHQ QUESTIONS 1-9: 4
9. THOUGHTS THAT YOU WOULD BE BETTER OFF DEAD, OR OF HURTING YOURSELF: NOT AT ALL
SUM OF ALL RESPONSES TO PHQ QUESTIONS 1-9: 4
4. FEELING TIRED OR HAVING LITTLE ENERGY: SEVERAL DAYS
8. MOVING OR SPEAKING SO SLOWLY THAT OTHER PEOPLE COULD HAVE NOTICED. OR THE OPPOSITE, BEING SO FIGETY OR RESTLESS THAT YOU HAVE BEEN MOVING AROUND A LOT MORE THAN USUAL: NOT AT ALL
1. LITTLE INTEREST OR PLEASURE IN DOING THINGS: SEVERAL DAYS
10. IF YOU CHECKED OFF ANY PROBLEMS, HOW DIFFICULT HAVE THESE PROBLEMS MADE IT FOR YOU TO DO YOUR WORK, TAKE CARE OF THINGS AT HOME, OR GET ALONG WITH OTHER PEOPLE: NOT DIFFICULT AT ALL
SUM OF ALL RESPONSES TO PHQ QUESTIONS 1-9: 4
5. POOR APPETITE OR OVEREATING: NOT AT ALL
7. TROUBLE CONCENTRATING ON THINGS, SUCH AS READING THE NEWSPAPER OR WATCHING TELEVISION: NOT AT ALL
SUM OF ALL RESPONSES TO PHQ QUESTIONS 1-9: 4
6. FEELING BAD ABOUT YOURSELF - OR THAT YOU ARE A FAILURE OR HAVE LET YOURSELF OR YOUR FAMILY DOWN: NOT AT ALL
3. TROUBLE FALLING OR STAYING ASLEEP: SEVERAL DAYS

## 2025-06-09 NOTE — PROGRESS NOTES
Dispense: 90 tablet; Refill: 1  - CBC with Auto Differential; Future  - Hemoglobin A1C; Future    5. Hypogonadism in male    Reviewed testosterone labs, total testosterone in 2023 was low a.m. reading.  Has been on testosterone replacement since then, most recent testosterone levels within normal limits.    - Testosterone (ANDROGEL) 20.25 MG/ACT (1.62%) GEL gel; 2 pump on each shoulder qd  Dispense: 6 each; Refill: 1    6. Hyperlipidemia with target LDL less than 100    Chronic condition, controlled.  Continue rosuvastatin 20 mg daily.    - Lipid Panel; Future    7. Anxiety and depression    Chronic condition, controlled, continue Wellbutrin 300 mg daily and Lexapro 20 mg daily.    8. Thyroid disorder screen    - TSH reflex to FT4; Future

## 2025-06-09 NOTE — PATIENT INSTRUCTIONS
- Continue with your medications as prescribed, refills have been sent    - Plan to transition you to Mounjaro 7.5 mg once weekly to replace the Ozempic    - Reminder for your Cologuard colon cancer screening test which needs to be done every 3 years if it is negative.  If it is positive then you need colonoscopy    - Continue with annual eye exams  - Please check your feet daily, make sure you keep them clean and dry and watch out for any wounds.  Always wear shoes including at home can wear sandals to protect your feet    - Follow-up with me in 3 months or sooner if needed

## 2025-06-10 ENCOUNTER — RESULTS FOLLOW-UP (OUTPATIENT)
Dept: INTERNAL MEDICINE CLINIC | Age: 50
End: 2025-06-10

## 2025-07-25 ENCOUNTER — TELEPHONE (OUTPATIENT)
Dept: INTERNAL MEDICINE CLINIC | Age: 50
End: 2025-07-25

## 2025-08-14 ENCOUNTER — PATIENT MESSAGE (OUTPATIENT)
Dept: INTERNAL MEDICINE CLINIC | Age: 50
End: 2025-08-14

## 2025-08-14 DIAGNOSIS — E29.1 HYPOGONADISM IN MALE: ICD-10-CM

## 2025-08-14 DIAGNOSIS — E11.9 TYPE 2 DIABETES MELLITUS WITHOUT COMPLICATION, WITHOUT LONG-TERM CURRENT USE OF INSULIN (HCC): ICD-10-CM

## 2025-08-15 RX ORDER — TESTOSTERONE CYPIONATE 200 MG/ML
150 INJECTION, SOLUTION INTRAMUSCULAR
Qty: 4.5 ML | Refills: 0 | Status: SHIPPED | OUTPATIENT
Start: 2025-08-15 | End: 2025-11-13

## 2025-08-15 RX ORDER — SEMAGLUTIDE 2.68 MG/ML
2 INJECTION, SOLUTION SUBCUTANEOUS
Qty: 9 ML | Refills: 1 | Status: SHIPPED | OUTPATIENT
Start: 2025-08-15

## 2025-08-30 ENCOUNTER — TELEPHONE (OUTPATIENT)
Dept: ADMINISTRATIVE | Age: 50
End: 2025-08-30

## 2025-09-04 DIAGNOSIS — E11.69 ERECTILE DYSFUNCTION ASSOCIATED WITH TYPE 2 DIABETES MELLITUS (HCC): Primary | ICD-10-CM

## 2025-09-04 DIAGNOSIS — E29.1 HYPOGONADISM IN MALE: ICD-10-CM

## 2025-09-04 DIAGNOSIS — N52.1 ERECTILE DYSFUNCTION ASSOCIATED WITH TYPE 2 DIABETES MELLITUS (HCC): Primary | ICD-10-CM

## (undated) DEVICE — MASTISOL ADHESIVE LIQ 2/3ML

## (undated) DEVICE — SUTURE MCRYL SZ 4-0 L18IN ABSRB UD L19MM PS-2 3/8 CIR PRIM Y496G

## (undated) DEVICE — GLOVE ORANGE PI 7 1/2   MSG9075

## (undated) DEVICE — SYRINGE, LUER LOCK, 10ML: Brand: MEDLINE

## (undated) DEVICE — STAPLER SKIN H3.9MM WIRE DIA0.58MM CRWN 6.9MM 35 STPL ROT

## (undated) DEVICE — 3.5 MM INCISOR STRAIGHT BLADES,                                    POWER/EP-1, MEDIUM GRAY, PACKAGED 6                                    PER BOX, STERILE

## (undated) DEVICE — Z INACTIVE NO SUPPLIER SOLUTIONIRRIG 3000ML 0.9% SOD CHL FLX CONT [79720808] [HOSPIRA WORLDWIDE INC]

## (undated) DEVICE — Z CONVERTED USE 2273232 BANDAGE COMPR W6INXL11YD E KNIT DBL SELF CLSR EZE-BAND

## (undated) DEVICE — TURNOVER KIT RM INF CTRL TECH

## (undated) DEVICE — PADDING CAST N ADH 12X6 IN CRIMPED FINISH 100% COTTON WBRLII

## (undated) DEVICE — DRAPE,INSTRUMENT,MAGNETIC,10X16: Brand: MEDLINE

## (undated) DEVICE — COVER LT HNDL BLU PLAS

## (undated) DEVICE — GARMENT,MEDLINE,DVT,INT,CALF,MED, GEN2: Brand: MEDLINE

## (undated) DEVICE — SUTURE CHROMIC GUT SZ 4-0 L18IN ABSRB BRN L13MM P-3 3/8 CIR 1654G

## (undated) DEVICE — TOWEL,OR,DSP,ST,BLUE,DLX,8/PK,10PK/CS: Brand: MEDLINE

## (undated) DEVICE — TUBING PMP L6FT CONT WAVE EXTN

## (undated) DEVICE — SUTURE PLN GUT SZ 5-0 L18IN ABSRB YELLOWISH TAN L13MM PC-1 1915G

## (undated) DEVICE — COTTON BALLS: Brand: DEROYAL

## (undated) DEVICE — SOLUTION IV 1000ML 0.9% SOD CHL

## (undated) DEVICE — MANIFOLD SURG NEPTUNE WST MGMT

## (undated) DEVICE — STANDARD HYPODERMIC NEEDLE,POLYPROPYLENE HUB: Brand: MONOJECT

## (undated) DEVICE — TUBING, SUCTION, 1/4" X 12', STRAIGHT: Brand: MEDLINE

## (undated) DEVICE — ELECTROSURGICAL PENCIL ROCKER SWITCH NON COATED BLADE ELECTRODE 10 FT (3 M) CORD HOLSTER: Brand: MEGADYNE

## (undated) DEVICE — E-Z CLEAN, NON-STICK, PTFE COATED, ELECTROSURGICAL BLADE ELECTRODE, MODIFIED EXTENDED INSULATION, 2.5 INCH (6.35 CM): Brand: MEGADYNE

## (undated) DEVICE — #1020 STERI DRAPE 41MM X 41MM SMALL: Brand: STERI-DRAPE™

## (undated) DEVICE — Z DISCONTINUED USE 2131664 WIPE INSTR L4XW5IN SPNG MEROCEL

## (undated) DEVICE — BLADE MYR OFFSET 45DEG SPEAR TIP NAR SHFT W/ RND KNURLED

## (undated) DEVICE — GLOVE NON LATEX  SIZE 8.0

## (undated) DEVICE — MEDI-VAC NON-CONDUCTIVE SUCTION TUBING: Brand: CARDINAL HEALTH

## (undated) DEVICE — SYRINGE MED 20ML CLR PLAS N CTRL LUERLOCK TIP W/O NDL DISP

## (undated) DEVICE — TUBING PMP L16FT MAIN DISP FOR AR-6400 AR-6475

## (undated) DEVICE — TUBE SUCT LAPSCP

## (undated) DEVICE — STERILE LATEX POWDER-FREE SURGICAL GLOVESWITH NITRILE COATING: Brand: PROTEXIS

## (undated) DEVICE — PACK,EENT,TURBAN DRAPE,PK II: Brand: MEDLINE

## (undated) DEVICE — PACK PROCEDURE SURG SURGERYARTHROSCOPY KNEE

## (undated) DEVICE — BLADE SHV L13CM DIA4MM EXCALIBUR AGG COOLCUT

## (undated) DEVICE — MEDICINE CUPS: Brand: DEROYAL